# Patient Record
Sex: MALE | Race: WHITE | NOT HISPANIC OR LATINO | Employment: FULL TIME | URBAN - METROPOLITAN AREA
[De-identification: names, ages, dates, MRNs, and addresses within clinical notes are randomized per-mention and may not be internally consistent; named-entity substitution may affect disease eponyms.]

---

## 2017-01-09 PROCEDURE — 82274 ASSAY TEST FOR BLOOD FECAL: CPT | Performed by: FAMILY MEDICINE

## 2017-01-16 ENCOUNTER — OFFICE VISIT (OUTPATIENT)
Dept: FAMILY MEDICINE | Facility: CLINIC | Age: 52
End: 2017-01-16
Payer: COMMERCIAL

## 2017-01-16 VITALS
TEMPERATURE: 96.6 F | DIASTOLIC BLOOD PRESSURE: 82 MMHG | SYSTOLIC BLOOD PRESSURE: 130 MMHG | WEIGHT: 220.8 LBS | OXYGEN SATURATION: 93 % | HEART RATE: 62 BPM | BODY MASS INDEX: 34.65 KG/M2 | HEIGHT: 67 IN

## 2017-01-16 DIAGNOSIS — Z86.79 H/O AORTIC DISSECTION: ICD-10-CM

## 2017-01-16 DIAGNOSIS — Z12.11 SCREEN FOR COLON CANCER: ICD-10-CM

## 2017-01-16 DIAGNOSIS — L30.9 ECZEMA, UNSPECIFIED TYPE: ICD-10-CM

## 2017-01-16 DIAGNOSIS — I10 HYPERTENSION GOAL BP (BLOOD PRESSURE) < 130/80: Primary | ICD-10-CM

## 2017-01-16 DIAGNOSIS — E78.5 HYPERLIPIDEMIA WITH TARGET LDL LESS THAN 130: ICD-10-CM

## 2017-01-16 DIAGNOSIS — Z23 NEED FOR PROPHYLACTIC VACCINATION AND INOCULATION AGAINST INFLUENZA: ICD-10-CM

## 2017-01-16 DIAGNOSIS — B00.1 RECURRENT COLD SORES: ICD-10-CM

## 2017-01-16 DIAGNOSIS — Z11.59 NEED FOR HEPATITIS C SCREENING TEST: ICD-10-CM

## 2017-01-16 LAB — HEMOCCULT STL QL IA: NEGATIVE

## 2017-01-16 PROCEDURE — 99214 OFFICE O/P EST MOD 30 MIN: CPT | Performed by: FAMILY MEDICINE

## 2017-01-16 RX ORDER — MOMETASONE FUROATE 1 MG/G
OINTMENT TOPICAL
Qty: 15 G | Refills: 0 | Status: SHIPPED | OUTPATIENT
Start: 2017-01-16 | End: 2018-01-15

## 2017-01-16 RX ORDER — VALACYCLOVIR HYDROCHLORIDE 1 G/1
2000 TABLET, FILM COATED ORAL 2 TIMES DAILY
Qty: 4 TABLET | Refills: 1 | Status: SHIPPED | OUTPATIENT
Start: 2017-01-16 | End: 2024-02-09

## 2017-01-16 RX ORDER — CARVEDILOL 25 MG/1
25 TABLET ORAL 2 TIMES DAILY WITH MEALS
Qty: 180 TABLET | Refills: 1 | Status: SHIPPED | OUTPATIENT
Start: 2017-01-16 | End: 2017-07-13

## 2017-01-16 RX ORDER — LISINOPRIL AND HYDROCHLOROTHIAZIDE 20; 25 MG/1; MG/1
1 TABLET ORAL DAILY
Qty: 90 TABLET | Refills: 1 | Status: SHIPPED | OUTPATIENT
Start: 2017-01-16 | End: 2017-09-19

## 2017-01-16 ASSESSMENT — ANXIETY QUESTIONNAIRES
5. BEING SO RESTLESS THAT IT IS HARD TO SIT STILL: NOT AT ALL
1. FEELING NERVOUS, ANXIOUS, OR ON EDGE: NOT AT ALL
7. FEELING AFRAID AS IF SOMETHING AWFUL MIGHT HAPPEN: NOT AT ALL
3. WORRYING TOO MUCH ABOUT DIFFERENT THINGS: NOT AT ALL
6. BECOMING EASILY ANNOYED OR IRRITABLE: NOT AT ALL
IF YOU CHECKED OFF ANY PROBLEMS ON THIS QUESTIONNAIRE, HOW DIFFICULT HAVE THESE PROBLEMS MADE IT FOR YOU TO DO YOUR WORK, TAKE CARE OF THINGS AT HOME, OR GET ALONG WITH OTHER PEOPLE: NOT DIFFICULT AT ALL
GAD7 TOTAL SCORE: 0
2. NOT BEING ABLE TO STOP OR CONTROL WORRYING: NOT AT ALL

## 2017-01-16 ASSESSMENT — PAIN SCALES - GENERAL: PAINLEVEL: NO PAIN (0)

## 2017-01-16 ASSESSMENT — PATIENT HEALTH QUESTIONNAIRE - PHQ9: 5. POOR APPETITE OR OVEREATING: NOT AT ALL

## 2017-01-16 NOTE — NURSING NOTE
"Chief Complaint   Patient presents with     Hypertension       Initial /82 mmHg  Pulse 62  Temp(Src) 96.6  F (35.9  C) (Oral)  Ht 5' 7\" (1.702 m)  Wt 220 lb 12.8 oz (100.154 kg)  BMI 34.57 kg/m2  SpO2 93% Estimated body mass index is 34.57 kg/(m^2) as calculated from the following:    Height as of this encounter: 5' 7\" (1.702 m).    Weight as of this encounter: 220 lb 12.8 oz (100.154 kg).  BP completed using cuff size: katerina Forbes CMA    "

## 2017-01-16 NOTE — MR AVS SNAPSHOT
After Visit Summary   1/16/2017    Luther Mariee    MRN: [id number]           Patient Information     Date Of Birth          1965        Visit Information        Provider Department      1/16/2017 10:00 AM Watson Bhagat MD [facility]        Today's Diagnoses     Hypertension goal BP (blood pressure) < 130/80    -  1     H/O aortic dissection         Hyperlipidemia with target LDL less than 130         Recurrent cold sores         Eczema, unspecified type         Screen for colon cancer         Need for hepatitis C screening test         Need for prophylactic vaccination and inoculation against influenza           Care Instructions    [facility]    If you have any questions regarding to your visit please contact your care team:       Team Purple:   Clinic Hours Telephone Number   [facility] [address] Dr.   7am-7pm  Monday - Thursday   7am-5pm  Fridays  [phone]  (Appointment scheduling available 24/7)    Questions about your Visit?   Team Line:  [phone]   Urgent Care - [place] and [facility] - 11am-9pm Monday-Friday Saturday-Sunday- 9am-5pm   [place] - 5pm-9pm Monday-Friday Saturday-Sunday- 9am-5pm  [phone] - [place]   [phone] - [place]       What options do I have for visits at the clinic other than the traditional office visit?  To expand how we care for you, many of our providers are utilizing electronic visits (e-visits) and telephone visits, when medically appropriate, for interactions with their patients rather than a visit in the clinic.   We also offer nurse visits for many medical concerns. Just like any other service, we will bill your insurance company for this type of visit based on time spent on the phone with your provider. Not all insurance companies cover these visits. Please check with your medical insurance if this type of visit is covered. You  will be responsible for any charges that are not paid by your insurance.      E-visits via [email or web address]:  generally incur a $35.00 fee.  Telephone visits:  Time spent on the phone: *charged based on time that is spent on the phone in increments of 10 minutes. Estimated cost:   5-10 mins $30.00   11-20 mins. $59.00   21-30 mins. $85.00     Use [email or web address] (secure email communication and access to your chart) to send your primary care provider a message or make an appointment. Ask someone on your Team how to sign up for [employer].  For a Price Quote for your services, please call our [employer] Line at [phone].  As always, Thank you for trusting us with your health care needs!    Discharged by Ashtyn Forbes CMA          Follow-ups after your visit        Future tests that were ordered for you today     Open Future Orders        Priority Expected Expires Ordered    Hepatitis C Screen Reflex to HCV RNA Quant and Genotype Routine  1/16/2018 1/16/2017    Lipid panel reflex to direct LDL Routine  1/16/2018 1/16/2017    Albumin Random Urine Quantitative Routine  1/16/2018 1/16/2017    Basic metabolic panel Routine  1/16/2018 1/16/2017            Who to contact     If you have questions or need follow up information about today's clinic visit or your schedule please contact [facility] directly at [phone].  Normal or non-critical lab and imaging results will be communicated to you by MyChart, letter or phone within 4 business days after the clinic has received the results. If you do not hear from us within 7 days, please contact the clinic through MyChart or phone. If you have a critical or abnormal lab result, we will notify you by phone as soon as possible.  Submit refill requests through [employer] or call your pharmacy and they will forward the refill request to us. Please allow 3 business days for your refill to be completed.          Additional Information About Your Visit        [email or web address] Information      "[employer] gives you secure access to your electronic health record. If you see a primary care provider, you can also send messages to your care team and make appointments. If you have questions, please call your primary care clinic.  If you do not have a primary care provider, please call [phone] and they will assist you.        Care EveryWhere ID     This is your Care EveryWhere ID. This could be used by other organizations to access your [place] medical records  [id number]        Your Vitals Were     Pulse Temperature Height BMI (Body Mass Index) Pulse Oximetry       62 96.6  F (35.9  C) (Oral) 5' 7\" (1.702 m) 34.57 kg/m2 93%        Blood Pressure from Last 3 Encounters:   01/16/17 130/82   03/21/16 132/82   08/31/15 119/68    Weight from Last 3 Encounters:   01/16/17 220 lb 12.8 oz (100.154 kg)   03/21/16 216 lb 6.4 oz (98.158 kg)   08/31/15 215 lb (97.523 kg)                 Today's Medication Changes          These changes are accurate as of: 1/16/17 10:35 AM.  If you have any questions, ask your nurse or doctor.               Start taking these medicines.        Dose/Directions    carvedilol 25 MG tablet   Commonly known as:  COREG   Used for:  Hypertension goal BP (blood pressure) < 130/80   Started by:  Watson Bhagat MD        Dose:  25 mg   Take 1 tablet (25 mg) by mouth 2 times daily (with meals)   Quantity:  180 tablet   Refills:  1       valACYclovir 1000 mg tablet   Commonly known as:  VALTREX   Used for:  Recurrent cold sores   Started by:  Watson Bhagat MD        Dose:  2000 mg   Take 2 tablets (2,000 mg) by mouth 2 times daily   Quantity:  4 tablet   Refills:  1         These medicines have changed or have updated prescriptions.        Dose/Directions    lisinopril-hydrochlorothiazide 20-25 MG per tablet   Commonly known as:  PRINZIDE/ZESTORETIC   This may have changed:  See the new instructions.   Used for:  Hypertension goal BP (blood pressure) < 130/80   Changed by:  " Watson Bhagat MD        Dose:  1 tablet   Take 1 tablet by mouth daily   Quantity:  90 tablet   Refills:  1            Where to get your medicines      These medications were sent to [place] [address] [facility] [place] - [address]  [address] [facility] [place] [address]     Phone:  [phone]    - carvedilol 25 MG tablet  - lisinopril-hydrochlorothiazide 20-25 MG per tablet  - mometasone 0.1 % ointment  - valACYclovir 1000 mg tablet             Primary Care Provider Office Phone # Fax #    Watson Bhagat [phone]       [address]  [place] [address]        Thank you!     Thank you for choosing [facility]  for your care. Our goal is always to provide you with excellent care. Hearing back from our patients is one way we can continue to improve our services. Please take a few minutes to complete the written survey that you may receive in the mail after your visit with us. Thank you!             Your Updated Medication List - Protect others around you: Learn how to safely use, store and throw away your medicines at www.disposemymeds.org.          This list is accurate as of: 1/16/17 10:35 AM.  Always use your most recent med list.                   Brand Name Dispense Instructions for use    aspirin 81 MG tablet      Take 1 tablet by mouth daily.       carvedilol 25 MG tablet    COREG    180 tablet    Take 1 tablet (25 mg) by mouth 2 times daily (with meals)       ibuprofen 200 MG tablet   Generic drug:  ibuprofen      Take 800 mg by mouth.       lisinopril-hydrochlorothiazide 20-25 MG per tablet    PRINZIDE/ZESTORETIC    90 tablet    Take 1 tablet by mouth daily       metoprolol 100 MG tablet    LOPRESSOR    180 tablet    TAKE 1 TABLET BY MOUTH TWICE DAILY       mometasone 0.1 % ointment    ELOCON    15 g    Apply sparingly to affected area twice daily for 14 days.  Do not apply to face. (Follow up for further refills)       valACYclovir  1000 mg tablet    VALTREX    4 tablet    Take 2 tablets (2,000 mg) by mouth 2 times daily

## 2017-01-16 NOTE — PROGRESS NOTES
SUBJECTIVE:                                                    Luther Mariee is a 51 year old male who presents to clinic today for the following health issues:    Hypertension Follow-up      Outpatient blood pressures are being checked at home.  Results are 120/80.    Low Salt Diet: no added salt     AAA   Had a repair 6 yrs ago  MRI from last year ago was fine. Also had evaluation by cardiology and were happy with the way things are going   Since left hospital, due a dissecting aneurysm and has not feeling well since; tired all the time.   With the new exercise feeling better. He is able to work 10 hours a day.    Weight:   Not been able to lose weight; started on a new exercise and diet  Wt Readings from Last 4 Encounters:   01/16/17 220 lb 12.8 oz (100.154 kg)   03/21/16 216 lb 6.4 oz (98.158 kg)   08/31/15 215 lb (97.523 kg)   09/08/14 203 lb (92.08 kg)     Recurrent Herpes:   Takes Valtrex.    Eczema:   Has recurrent rashes.        Amount of exercise or physical activity: 6-7 days/week for an average of 15-30 minutes    Problems taking medications regularly: No    Medication side effects: none    Diet: low salt    PROBLEMS TO ADD ON...    Problem list and histories reviewed & adjusted, as indicated.  Additional history: as documented    Patient Active Problem List   Diagnosis     Aortic dissection (H)     Splenic infarct     CARDIOVASCULAR SCREENING; LDL GOAL LESS THAN 130     Hypertension goal BP (blood pressure) < 130/80     Health Care Home     Ascending aortic aneurysm (H)     SBO (small bowel obstruction) (H)     Ischemic colitis (H)     Bicuspid aortic valve     bmi 30     Vitamin B12 deficiency     Vitamin D deficiency     Chronic pain     Hyperlipidemia with target LDL less than 130     Other chronic pain     H/O aortic dissection     CKD (chronic kidney disease) stage 2, GFR 60-89 ml/min     Past Surgical History   Procedure Laterality Date     Cholecystectomy, open       Endovas repair,  "infrarenl abdom aortic aneurysm/dissect; mswdf-vdn-ajyib/aorto-unifemoral prosthesis  2/10     aorta dissection     Small bowel resection  2010     Dr. Vane River     Colonoscopy  2010     superficial ulcerws rt colon       Social History   Substance Use Topics     Smoking status: Former Smoker     Quit date: 12/06/2001     Smokeless tobacco: Never Used     Alcohol Use: No     Family History   Problem Relation Age of Onset     Unknown/Adopted Mother      Unknown/Adopted Father      Unknown/Adopted Maternal Grandmother      Unknown/Adopted Maternal Grandfather      Unknown/Adopted Paternal Grandmother      Unknown/Adopted Paternal Grandfather          ROS:  Constitutional, HEENT, cardiovascular, pulmonary, gi and gu systems are negative, except as otherwise noted.    OBJECTIVE:                                                    /82 mmHg  Pulse 62  Temp(Src) 96.6  F (35.9  C) (Oral)  Ht 5' 7\" (1.702 m)  Wt 220 lb 12.8 oz (100.154 kg)  BMI 34.57 kg/m2  SpO2 93%  Body mass index is 34.57 kg/(m^2).  GENERAL: healthy, alert and no distress  EYES: Eyes grossly normal to inspection, PERRL and conjunctivae and sclerae normal  HENT: ear canals and TM's normal, nose and mouth without ulcers or lesions  NECK: no adenopathy and thyroid normal to palpation  RESP: lungs clear to auscultation - no rales, rhonchi or wheezes  CV: regular rate and rhythm, systolic murmur, click or rub, no peripheral edema  ABDOMEN: soft, nontender, no masses and bowel sounds normal  MS: no gross musculoskeletal defects noted, no edema  NEURO: Normal strength and tone, mentation intact and speech normal  PSYCH: mentation appears normal, affect normal/bright    Diagnostic Test Results:  none      ASSESSMENT/PLAN:                                                    (I10) Hypertension goal BP (blood pressure) < 130/80  (primary encounter diagnosis)  Comment: BP well controlled. Has concern about fatigue and wonders whether metoprolol can " cause that. Discussed fact that metoprolo can cause that and will switch to Coreg and see if this helps.  Plan: Albumin Random Urine Quantitative, Basic         metabolic panel, lisinopril-hydrochlorothiazide        (PRINZIDE/ZESTORETIC) 20-25 MG per tablet,         carvedilol (COREG) 25 MG tablet    (Z86.79) H/O aortic dissection  Comment: Stable. Had follow up MRI on 3/23/2016 and was normal.   Plan: Continue follow up with cardiology    (E78.5) Hyperlipidemia with target LDL less than 130  Comment: Not had a recent check; screen discussed having these checked and consider a statin if elevated    (B00.1) Recurrent cold sores  Comment: Valtex efill  Plan: valACYclovir (VALTREX) 1000 mg tablet    (L30.9) Eczema, unspecified type  Comment: Recurrent flare  Plan: mometasone (ELOCON) 0.1 % ointment    (Z12.11) Screen for colon cancer  Comment: Due for recheck, opted for FIT  Plan: FIT    (Z11.59) Need for hepatitis C screening test  Plan: Hepatitis C Screen Reflex to HCV RNA Quant and         Genotype, Lipid panel reflex to direct LDL    Follow up in 3 months or sooner with concerns    Watson Bhagat MD  [facility]

## 2017-01-16 NOTE — PATIENT INSTRUCTIONS
[facility]    If you have any questions regarding to your visit please contact your care team:       Team Purple:   Clinic Hours Telephone Number   [facility] [address] Dr.   7am-7pm  Monday - Thursday   7am-5pm  Fridays  [phone]  (Appointment scheduling available 24/7)    Questions about your Visit?   Team Line:  [phone]   Urgent Care - [place] and [facility] - 11am-9pm Monday-Friday Saturday-Sunday- 9am-5pm   [place] - 5pm-9pm Monday-Friday Saturday-Sunday- 9am-5pm  [phone] - [facility]  [phone] - [place]       What options do I have for visits at the clinic other than the traditional office visit?  To expand how we care for you, many of our providers are utilizing electronic visits (e-visits) and telephone visits, when medically appropriate, for interactions with their patients rather than a visit in the clinic.   We also offer nurse visits for many medical concerns. Just like any other service, we will bill your insurance company for this type of visit based on time spent on the phone with your provider. Not all insurance companies cover these visits. Please check with your medical insurance if this type of visit is covered. You will be responsible for any charges that are not paid by your insurance.      E-visits via [employer]:  generally incur a $35.00 fee.  Telephone visits:  Time spent on the phone: *charged based on time that is spent on the phone in increments of 10 minutes. Estimated cost:   5-10 mins $30.00   11-20 mins. $59.00   21-30 mins. $85.00     Use [employer] (secure email communication and access to your chart) to send your primary care provider a message or make an appointment. Ask someone on your Team how to sign up for [employer].  For a Price Quote for your services, please call our [employer] Line at [phone].  As always, Thank you for trusting us with your health care  needs!    Discharged by Ashtyn Forbes, CMA

## 2017-01-17 ASSESSMENT — ANXIETY QUESTIONNAIRES: GAD7 TOTAL SCORE: 0

## 2017-01-17 ASSESSMENT — PATIENT HEALTH QUESTIONNAIRE - PHQ9: SUM OF ALL RESPONSES TO PHQ QUESTIONS 1-9: 3

## 2017-04-03 ENCOUNTER — OFFICE VISIT (OUTPATIENT)
Dept: FAMILY MEDICINE | Facility: CLINIC | Age: 52
End: 2017-04-03
Payer: COMMERCIAL

## 2017-04-03 VITALS
DIASTOLIC BLOOD PRESSURE: 68 MMHG | HEIGHT: 67 IN | TEMPERATURE: 97 F | OXYGEN SATURATION: 98 % | SYSTOLIC BLOOD PRESSURE: 124 MMHG | HEART RATE: 88 BPM | WEIGHT: 221 LBS | BODY MASS INDEX: 34.69 KG/M2

## 2017-04-03 DIAGNOSIS — I71.00 DISSECTION OF AORTA, UNSPECIFIED PORTION OF AORTA (H): ICD-10-CM

## 2017-04-03 DIAGNOSIS — I71.21 ASCENDING AORTIC ANEURYSM (H): ICD-10-CM

## 2017-04-03 DIAGNOSIS — M77.9 ENTHESITIS: Primary | ICD-10-CM

## 2017-04-03 DIAGNOSIS — M72.2 PLANTAR FASCIITIS: ICD-10-CM

## 2017-04-03 PROCEDURE — 99214 OFFICE O/P EST MOD 30 MIN: CPT | Performed by: FAMILY MEDICINE

## 2017-04-03 RX ORDER — TRAMADOL HYDROCHLORIDE 50 MG/1
50 TABLET ORAL EVERY 8 HOURS PRN
Qty: 15 TABLET | Refills: 0 | Status: SHIPPED | OUTPATIENT
Start: 2017-04-03 | End: 2017-04-03

## 2017-04-03 RX ORDER — TRAMADOL HYDROCHLORIDE 50 MG/1
50 TABLET ORAL EVERY 8 HOURS PRN
Qty: 15 TABLET | Refills: 0 | Status: SHIPPED | OUTPATIENT
Start: 2017-04-03 | End: 2017-04-08

## 2017-04-03 NOTE — PROGRESS NOTES
SUBJECTIVE:                                                    Luther Mariee is a 51 year old male who presents to clinic today for the following health issues:    Patient with pain in the left heel; been doing ibuprofen a couple of time a day and is not helping  He is always on his feet; worse in the morning.    Joint Pain     Onset: 6 days    Description:   Location: left foot and heel  Character: Sharp, Dull ache and Stabbing    Intensity: moderate, severe    Progression of Symptoms: same    Accompanying Signs & Symptoms:  Other symptoms: none   History:   Previous similar pain: no       Precipitating factors:   Trauma or overuse: no     Alleviating factors:  Improved by: nothing       Therapies Tried and outcome: ice,wrapped ibuprofen,rest    Aortic dissection:   Due for MRI this year.   In 2010 treated with graft; Endovas repair, infrarenalabdom aortic aneurysm/dissect; zwcvf-asj-cohew/aorto-unifemoral prosthesis      3/23/2016:   Thoracic MRA with and without contrast:     1. The aortic root is severely dilated in size. The ascending aorta  has a graft that extends to the mid ascending aorta. There is a  dissection flap starting from the proximal aortic arch, involving the  innominate, the subclavian, and both common carotid arteries, and  extending all the way down the descending thoracic and abdominal aorta  beyond the iliac bifurcation. The pelvic aorta was not imaged beyond  this level.     2. There is extensive thrombosis in the false lumen starting from the  proximal descending thoracic aorta up to the origin of the celiac  artery. The celiac axis, superior mesenteric and inferior mesenteric  arteries as well as the renal arteries are normal. The dissection flap  extend into the left main renal artery.     3. The main and proximal branch pulmonary arteries are normal in size.        4. The systemic venous connections are normal.   Problem list and histories reviewed & adjusted, as  "indicated.  Additional history: as documented    Patient Active Problem List   Diagnosis     Aortic dissection (H)     Splenic infarct     CARDIOVASCULAR SCREENING; LDL GOAL LESS THAN 130     Hypertension goal BP (blood pressure) < 130/80     Health Care Home     Ascending aortic aneurysm (H)     SBO (small bowel obstruction) (H)     Ischemic colitis (H)     Bicuspid aortic valve     bmi 30     Vitamin B12 deficiency     Vitamin D deficiency     Chronic pain     Hyperlipidemia with target LDL less than 130     Other chronic pain     H/O aortic dissection     CKD (chronic kidney disease) stage 2, GFR 60-89 ml/min     Past Surgical History:   Procedure Laterality Date     CHOLECYSTECTOMY, OPEN       COLONOSCOPY  2010    superficial ulcerws rt colon     ENDOVAS REPAIR, INFRARENL ABDOM AORTIC ANEURYSM/DISSECT; NARWV-YPE-CXYCL/AORTO-UNIFEMORAL PROSTHESIS  2/10    aorta dissection     SMALL BOWEL RESECTION  2010    Dr. Vane River       Social History   Substance Use Topics     Smoking status: Former Smoker     Quit date: 12/6/2001     Smokeless tobacco: Never Used     Alcohol use No     Family History   Problem Relation Age of Onset     Unknown/Adopted Mother      Unknown/Adopted Father      Unknown/Adopted Maternal Grandmother      Unknown/Adopted Maternal Grandfather      Unknown/Adopted Paternal Grandmother      Unknown/Adopted Paternal Grandfather            Reviewed and updated as needed this visit by clinical staff  Tobacco  Allergies  Meds  Med Hx  Surg Hx  Fam Hx  Soc Hx      Reviewed and updated as needed this visit by Provider         ROS:  Constitutional, HEENT, cardiovascular, pulmonary, gi and gu systems are negative, except as otherwise noted.    OBJECTIVE:                                                    /68 (BP Location: Left arm, Patient Position: Chair, Cuff Size: Adult Large)  Pulse 88  Temp 97  F (36.1  C)  Ht 5' 7\" (1.702 m)  Wt 221 lb (100.2 kg)  SpO2 98%  BMI 34.61 kg/m2  Body " mass index is 34.61 kg/(m^2).  GENERAL: healthy, alert and no distress  NECK: no adenopathy and thyroid normal to palpation  RESP: lungs clear to auscultation - no rales, rhonchi or wheezes  CV: regular rate and rhythm, no murmur, click or rub, no peripheral edema   MS: no gross musculoskeletal defects noted, no edema  HEEL:   Tenderness in the tendon achilles insert.   Slight tenderness      ASSESSMENT/PLAN:                                                    (M77.9) Enthesitis  (primary encounter diagnosis)  Comment: Tendinitis of TA. .Discussed rest, ice, stretches and elevation. Will do a short course of tramadol    (M72.2) Plantar fasciitis    Discussed pathophysiology of this condition and implications. Superfeet insoles recommended. Good walking shoes or running shoes recommended for activities.  Minimize high-impact activities. Stretching twice daily. Ice after activity.     (I71.00) Dissection of aorta, unspecified portion of aorta (H)  Comment: With aortic dissection; in 2010 treated with graft; Endovas repair, infrarenalabdom aortic aneurysm/dissect; icxsb-yhd-nawyw/aorto-unifemoral prosthesis    (I71.2) Ascending aortic aneurysm (H)  Comment: BP well controlled on carvedilol, which he says is working great. Reviewed thoracic MRA from last year was normal. Not followed up with cardiology, but says is coming up.  Plan: Labs due; will come in fasting, future orders in.          Call for a posterior night splint if not improving in the next 2-3 weeks for heel pain.    Watson Bhagat MD  [facility]

## 2017-04-03 NOTE — MR AVS SNAPSHOT
After Visit Summary   4/3/2017    Luther Mariee    MRN: [id number]           Patient Information     Date Of Birth          1965        Visit Information        Provider Department      4/3/2017 10:40 AM Watson Bhagat MD [facility]        Today's Diagnoses     Enthesitis    -  1    Plantar fasciitis          Care Instructions    [facility]    If you have any questions regarding to your visit please contact your care team:       Team Purple:   Clinic Hours Telephone Number   [facility] [address] Dr[address]   7am-7pm  Monday - Thursday   7am-5pm  Fridays  [phone]  (Appointment scheduling available 24/7)    Questions about your Visit?   Team Line:  [phone]   [facility] [address] [facility] 11am-9pm Monday-Friday Saturday-Sunday- 9am-5pm   [place] - 5pm-9pm Monday-Friday Saturday-Sunday- 9am-5pm  [phone] - [place]   [phone] - [place]       What options do I have for visits at the clinic other than the traditional office visit?  To expand how we care for you, many of our providers are utilizing electronic visits (e-visits) and telephone visits, when medically appropriate, for interactions with their patients rather than a visit in the clinic.   We also offer nurse visits for many medical concerns. Just like any other service, we will bill your insurance company for this type of visit based on time spent on the phone with your provider. Not all insurance companies cover these visits. Please check with your medical insurance if this type of visit is covered. You will be responsible for any charges that are not paid by your insurance.      E-visits via mimoOn:  generally incur a $35.00 fee.  Telephone visits:  Time spent on the phone: *charged based on time that is spent on the phone in increments of 10 minutes. Estimated cost:   5-10 mins $30.00   11-20 mins. $59.00  "  21-30 mins. $85.00     Use Glu Mobile (secure email communication and access to your chart) to send your primary care provider a message or make an appointment. Ask someone on your Team how to sign up for GeoPollt.  For a Price Quote for your services, please call our Consumer Price Line at [phone].  As always, Thank you for trusting us with your health care needs!            Follow-ups after your visit        Who to contact     If you have questions or need follow up information about today's clinic visit or your schedule please contact [facility] directly at [phone].  Normal or non-critical lab and imaging results will be communicated to you by Cemmercehart, letter or phone within 4 business days after the clinic has received the results. If you do not hear from us within 7 days, please contact the clinic through GeoPollt or phone. If you have a critical or abnormal lab result, we will notify you by phone as soon as possible.  Submit refill requests through Glu Mobile or call your pharmacy and they will forward the refill request to us. Please allow 3 business days for your refill to be completed.          Additional Information About Your Visit        Cemmercehart Information     GeoPollt gives you secure access to your electronic health record. If you see a primary care provider, you can also send messages to your care team and make appointments. If you have questions, please call your primary care clinic.  If you do not have a primary care provider, please call [phone] and they will assist you.        Care EveryWhere ID     This is your Care EveryWhere ID. This could be used by other organizations to access your [facility] medical records  [id number]        Your Vitals Were     Pulse Temperature Height Pulse Oximetry BMI (Body Mass Index)       88 97  F (36.1  C) 5' 7\" (1.702 m) 98% 34.61 kg/m2        Blood Pressure from Last 3 Encounters:   04/03/17 124/68   01/16/17 130/82   03/21/16 132/82    " Weight from Last 3 Encounters:   04/03/17 221 lb (100.2 kg)   01/16/17 220 lb 12.8 oz (100.2 kg)   03/21/16 216 lb 6.4 oz (98.2 kg)              Today, you had the following     No orders found for display         Today's Medication Changes          These changes are accurate as of: 4/3/17 11:05 AM.  If you have any questions, ask your nurse or doctor.               Start taking these medicines.        Dose/Directions    traMADol 50 MG tablet   Commonly known as:  ULTRAM   Used for:  Enthesitis, Plantar fasciitis   Started by:  Watson Bhagat MD        Dose:  50 mg   Take 1 tablet (50 mg) by mouth every 8 hours as needed for moderate pain   Quantity:  15 tablet   Refills:  0         Stop taking these medicines if you haven't already. Please contact your care team if you have questions.     metoprolol 100 MG tablet   Commonly known as:  LOPRESSOR   Stopped by:  Watson Bhagat MD                Where to get your medicines      Some of these will need a paper prescription and others can be bought over the counter.  Ask your nurse if you have questions.     Bring a paper prescription for each of these medications     traMADol 50 MG tablet                Primary Care Provider Office Phone # Fax #    Watson Bhagat [phone]       [address] [address]        Thank you!     Thank you for choosing [facility]  for your care. Our goal is always to provide you with excellent care. Hearing back from our patients is one way we can continue to improve our services. Please take a few minutes to complete the written survey that you may receive in the mail after your visit with us. Thank you!             Your Updated Medication List - Protect others around you: Learn how to safely use, store and throw away your medicines at www.disposemymeds.org.          This list is accurate as of: 4/3/17 11:05 AM.  Always use your most recent med list.                    Brand Name Dispense Instructions for use    aspirin 81 MG tablet      Take 1 tablet by mouth daily.       carvedilol 25 MG tablet    COREG    180 tablet    Take 1 tablet (25 mg) by mouth 2 times daily (with meals)       ibuprofen 200 MG tablet   Generic drug:  ibuprofen      Take 800 mg by mouth.       lisinopril-hydrochlorothiazide 20-25 MG per tablet    PRINZIDE/ZESTORETIC    90 tablet    Take 1 tablet by mouth daily       mometasone 0.1 % ointment    ELOCON    15 g    Apply sparingly to affected area twice daily for 14 days.  Do not apply to face. (Follow up for further refills)       traMADol 50 MG tablet    ULTRAM    15 tablet    Take 1 tablet (50 mg) by mouth every 8 hours as needed for moderate pain       valACYclovir 1000 mg tablet    VALTREX    4 tablet    Take 2 tablets (2,000 mg) by mouth 2 times daily

## 2017-04-03 NOTE — PATIENT INSTRUCTIONS
[facility]    If you have any questions regarding to your visit please contact your care team:       Team Purple:   Clinic Hours Telephone Number   [facility] [address] Dr[address]   7am-7pm  Monday - Thursday   7am-5pm  Fridays  [phone]  (Appointment scheduling available 24/7)    Questions about your Visit?   Team Line:  [phone]   Urgent Care - [place] and [facility] - 11am-9pm Monday-Friday Saturday-Sunday- 9am-5pm   [place] - 5pm-9pm Monday-Friday Saturday-Sunday- 9am-5pm  [phone] - [facility]  [phone] - [place]       What options do I have for visits at the clinic other than the traditional office visit?  To expand how we care for you, many of our providers are utilizing electronic visits (e-visits) and telephone visits, when medically appropriate, for interactions with their patients rather than a visit in the clinic.   We also offer nurse visits for many medical concerns. Just like any other service, we will bill your insurance company for this type of visit based on time spent on the phone with your provider. Not all insurance companies cover these visits. Please check with your medical insurance if this type of visit is covered. You will be responsible for any charges that are not paid by your insurance.      E-visits via Kannuu:  generally incur a $35.00 fee.  Telephone visits:  Time spent on the phone: *charged based on time that is spent on the phone in increments of 10 minutes. Estimated cost:   5-10 mins $30.00   11-20 mins. $59.00   21-30 mins. $85.00     Use Reorg Researcht (secure email communication and access to your chart) to send your primary care provider a message or make an appointment. Ask someone on your Team how to sign up for Kannuu.  For a Price Quote for your services, please call our Consumer Price Line at [phone].  As always, Thank you for trusting us with your health care needs!

## 2017-04-03 NOTE — NURSING NOTE
"Chief Complaint   Patient presents with     Musculoskeletal Problem     left foot pain       Initial /68 (BP Location: Left arm, Patient Position: Chair, Cuff Size: Adult Large)  Pulse 88  Temp 97  F (36.1  C)  Ht 5' 7\" (1.702 m)  Wt 221 lb (100.2 kg)  SpO2 98%  BMI 34.61 kg/m2 Estimated body mass index is 34.61 kg/(m^2) as calculated from the following:    Height as of this encounter: 5' 7\" (1.702 m).    Weight as of this encounter: 221 lb (100.2 kg).  Medication Reconciliation: complete   Marlena Carroll MA      "

## 2017-04-30 ENCOUNTER — MYC MEDICAL ADVICE (OUTPATIENT)
Dept: FAMILY MEDICINE | Facility: CLINIC | Age: 52
End: 2017-04-30

## 2017-04-30 DIAGNOSIS — L98.9 SKIN LESIONS: Primary | ICD-10-CM

## 2017-05-01 NOTE — TELEPHONE ENCOUNTER
Referral placed give patient number to call to set up appointmment    Your provider has referred you to: [facility] (2 locations) [phone]   [email or web address]    Please be aware that coverage of these services is subject to the terms and limitations of your health insurance plan.  Call member services at your health plan with any benefit or coverage questions.      Please bring the following with you to your appointment:    (1) Any X-Rays, CTs or MRIs which have been performed.  Contact the facility where they were done to arrange for  prior to your scheduled appointment.    (2) List of current medications  (3) This referral request   (4) Any documents/labs given to you for this referral

## 2017-05-16 NOTE — TELEPHONE ENCOUNTER
Copied from previous encounter:   Watson Bhagat MD Physician Signed  Date of Service: 05/16/2017 10:27 AM         It can be used to treat skin conditions but is prescribed by dermatologists; I will suggest that he discussed this option with the dermatologist he sees at [facility].           Bruna Fallon RN

## 2017-05-16 NOTE — TELEPHONE ENCOUNTER
Copied from [place]:   My friend who is a doctor, but now lives in [place].  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  The dermatologist suggested freezing them off at an ungodly cost.  My doctor friend said they do that, and they usually come back.  But this Fluorouracil-5 has worked for him his whole life.  He's 85.  Can I get a prescription for that cream?  Let me know.  Thanks!    Please advise   Bruna Fallon RN

## 2017-07-13 DIAGNOSIS — I10 HYPERTENSION GOAL BP (BLOOD PRESSURE) < 130/80: ICD-10-CM

## 2017-07-13 RX ORDER — CARVEDILOL 25 MG/1
TABLET ORAL
Qty: 180 TABLET | Refills: 1 | Status: SHIPPED | OUTPATIENT
Start: 2017-07-13 | End: 2018-01-06

## 2017-07-13 NOTE — TELEPHONE ENCOUNTER
Prescription approved per [facility] Refill Protocol.    Yolanda Anderson RN

## 2017-09-10 ENCOUNTER — TELEPHONE (OUTPATIENT)
Dept: FAMILY MEDICINE | Facility: CLINIC | Age: 52
End: 2017-09-10

## 2017-09-10 DIAGNOSIS — I10 HYPERTENSION GOAL BP (BLOOD PRESSURE) < 130/80: ICD-10-CM

## 2017-09-10 RX ORDER — LISINOPRIL AND HYDROCHLOROTHIAZIDE 20; 25 MG/1; MG/1
TABLET ORAL
Qty: 90 TABLET | Refills: 1 | Status: CANCELLED | OUTPATIENT
Start: 2017-09-10

## 2017-09-11 NOTE — TELEPHONE ENCOUNTER
Lisinopril-hydrochlorothiazide        Last Written Prescription Date: 01/16/2017  Last Fill Quantity: 90, # refills: 1  Last Office Visit with G, P or [facility] prescribing provider: 04/03/2017       Potassium   Date Value Ref Range Status   03/21/2016 3.6 3.4 - 5.3 mmol/L Final     Creatinine   Date Value Ref Range Status   03/21/2016 1.28 (H) 0.66 - 1.25 mg/dL Final     BP Readings from Last 3 Encounters:   04/03/17 124/68   01/16/17 130/82   03/21/16 132/82

## 2017-09-11 NOTE — TELEPHONE ENCOUNTER
Routing refill request to provider for review/approval because:  Labs out of range:  Maksim  Labs not current:  STEVEN Schaeffer RN - BC

## 2017-09-19 ENCOUNTER — TELEPHONE (OUTPATIENT)
Dept: FAMILY MEDICINE | Facility: CLINIC | Age: 52
End: 2017-09-19

## 2017-09-19 DIAGNOSIS — I10 HYPERTENSION GOAL BP (BLOOD PRESSURE) < 130/80: ICD-10-CM

## 2017-09-19 RX ORDER — LISINOPRIL AND HYDROCHLOROTHIAZIDE 20; 25 MG/1; MG/1
TABLET ORAL
Qty: 90 TABLET | Refills: 1 | Status: CANCELLED | OUTPATIENT
Start: 2017-09-19

## 2017-09-19 RX ORDER — LISINOPRIL AND HYDROCHLOROTHIAZIDE 20; 25 MG/1; MG/1
1 TABLET ORAL DAILY
Qty: 30 TABLET | Refills: 0 | Status: SHIPPED | OUTPATIENT
Start: 2017-09-19 | End: 2017-10-15

## 2017-09-19 RX ORDER — LISINOPRIL AND HYDROCHLOROTHIAZIDE 20; 25 MG/1; MG/1
1 TABLET ORAL DAILY
Qty: 90 TABLET | Refills: 1 | Status: CANCELLED | OUTPATIENT
Start: 2017-09-19

## 2017-09-19 NOTE — TELEPHONE ENCOUNTER
Reason for Call:  Other prescription    Detailed comments: Patient called to check on status of the refill for the prescription Lisinopril-HCTZ as he is currently out of this medication today / Patient has also scheduled an Lab Appt dated 10/4/17    [facility]     Phone Number Patient can be reached at: Home number on file [phone] (home)    Best Time: todday    Can we leave a detailed message on this number? YES    Call taken on 9/19/2017 at 7:43 AM by Malgorzata Iniguez

## 2017-09-19 NOTE — TELEPHONE ENCOUNTER
Routing refill request to provider for review/approval because:  Labs out of range:  Cr  Labs not current:  K, Maksim Katz RN - BC

## 2017-09-19 NOTE — TELEPHONE ENCOUNTER
lisinopril-hydrochlorothiazide (PRINZIDE/ZESTORETIC) 20-25 MG per tablet      Last Written Prescription Date: 01/16/2017  Last Fill Quantity: 90, # refills: 1  Last Office Visit with G, NADYA or [facility] prescribing provider: 04/13/2017       Potassium   Date Value Ref Range Status   03/21/2016 3.6 3.4 - 5.3 mmol/L Final     Creatinine   Date Value Ref Range Status   03/21/2016 1.28 (H) 0.66 - 1.25 mg/dL Final     BP Readings from Last 3 Encounters:   04/03/17 124/68   01/16/17 130/82   03/21/16 132/82     Tierra Castillo MA

## 2017-09-19 NOTE — TELEPHONE ENCOUNTER
30 day jade refill sent to pharmacy.  Patient due for labs for further refills.   Bruna Fallon RN

## 2017-10-15 DIAGNOSIS — Z12.11 COLON CANCER SCREENING: ICD-10-CM

## 2017-10-15 DIAGNOSIS — I10 HYPERTENSION GOAL BP (BLOOD PRESSURE) < 130/80: Primary | ICD-10-CM

## 2017-10-15 DIAGNOSIS — E78.5 HYPERLIPIDEMIA WITH TARGET LDL LESS THAN 130: ICD-10-CM

## 2017-10-16 NOTE — TELEPHONE ENCOUNTER
Lisinopril-hydrochlorothiazide   Last Written Prescription Date: 09/19/2017  Last Fill Quantity: 30, # refills: 0  Last Office Visit with G, P or [facility] prescribing provider: 04/03/2017     Potassium   Date Value Ref Range Status   03/21/2016 3.6 3.4 - 5.3 mmol/L Final     Creatinine   Date Value Ref Range Status   03/21/2016 1.28 (H) 0.66 - 1.25 mg/dL Final     BP Readings from Last 3 Encounters:   04/03/17 124/68   01/16/17 130/82   03/21/16 132/82

## 2017-10-17 NOTE — TELEPHONE ENCOUNTER
Routing refill request to provider for review/approval because:  Stacey given x1 and patient did not follow up, please advise  Labs not current:  Maksim HARRIS RN - BC

## 2017-10-19 RX ORDER — LISINOPRIL AND HYDROCHLOROTHIAZIDE 20; 25 MG/1; MG/1
TABLET ORAL
Qty: 30 TABLET | Refills: 0 | Status: SHIPPED | OUTPATIENT
Start: 2017-10-19 | End: 2017-11-09

## 2017-10-19 NOTE — TELEPHONE ENCOUNTER
Reason for Call:  Other prescription    Detailed comments:  Patient calling. He is leaving for out of town. He made an appointment for labs next week when he returns. Please refill his medication.     Phone Number Patient can be reached at: Cell number on file:    No relevant phone numbers on file.       Best Time:  Any     Can we leave a detailed message on this number? YES    Call taken on 10/19/2017 at 9:28 AM by Lissette Powell

## 2017-10-26 DIAGNOSIS — I10 HYPERTENSION GOAL BP (BLOOD PRESSURE) < 130/80: ICD-10-CM

## 2017-10-26 DIAGNOSIS — Z11.59 NEED FOR HEPATITIS C SCREENING TEST: ICD-10-CM

## 2017-10-26 LAB
ANION GAP SERPL CALCULATED.3IONS-SCNC: 7 MMOL/L (ref 3–14)
BUN SERPL-MCNC: 19 MG/DL (ref 7–30)
CALCIUM SERPL-MCNC: 8.8 MG/DL (ref 8.5–10.1)
CHLORIDE SERPL-SCNC: 99 MMOL/L (ref 94–109)
CHOLEST SERPL-MCNC: 216 MG/DL
CO2 SERPL-SCNC: 30 MMOL/L (ref 20–32)
CREAT SERPL-MCNC: 1.03 MG/DL (ref 0.66–1.25)
CREAT UR-MCNC: 161 MG/DL
GFR SERPL CREATININE-BSD FRML MDRD: 76 ML/MIN/1.7M2
GLUCOSE SERPL-MCNC: 287 MG/DL (ref 70–99)
HCV AB SERPL QL IA: NONREACTIVE
HDLC SERPL-MCNC: 35 MG/DL
LDLC SERPL CALC-MCNC: ABNORMAL MG/DL
LDLC SERPL DIRECT ASSAY-MCNC: 90 MG/DL
MICROALBUMIN UR-MCNC: 120 MG/L
MICROALBUMIN/CREAT UR: 74.53 MG/G CR (ref 0–17)
NONHDLC SERPL-MCNC: 181 MG/DL
POTASSIUM SERPL-SCNC: 4 MMOL/L (ref 3.4–5.3)
SODIUM SERPL-SCNC: 136 MMOL/L (ref 133–144)
TRIGL SERPL-MCNC: 680 MG/DL

## 2017-10-26 PROCEDURE — 86803 HEPATITIS C AB TEST: CPT | Performed by: FAMILY MEDICINE

## 2017-10-26 PROCEDURE — 83721 ASSAY OF BLOOD LIPOPROTEIN: CPT | Mod: 59 | Performed by: FAMILY MEDICINE

## 2017-10-26 PROCEDURE — 80061 LIPID PANEL: CPT | Performed by: FAMILY MEDICINE

## 2017-10-26 PROCEDURE — 80048 BASIC METABOLIC PNL TOTAL CA: CPT | Performed by: FAMILY MEDICINE

## 2017-10-26 PROCEDURE — 36415 COLL VENOUS BLD VENIPUNCTURE: CPT | Performed by: FAMILY MEDICINE

## 2017-10-26 PROCEDURE — 82043 UR ALBUMIN QUANTITATIVE: CPT | Performed by: FAMILY MEDICINE

## 2017-10-26 NOTE — LETTER
[facility]  [address] [place] [address]    November 3, 2017    Luther Mariee  [address] [address]          Dear Luther,    Your blood glucose and triglycerides are very high. This is suspicious for diabetes. Eat healthy foods like fruits, vegetables, chicken, fish, nuts, and low fat yogurt. Drink water and milk instead of pop and juice. Avoid sweets. Exercise regularly. Follow-up in clinic within 1 week for additional testing.     Results for orders placed or performed in visit on 10/26/17   Basic metabolic panel   Result Value Ref Range    Sodium 136 133 - 144 mmol/L    Potassium 4.0 3.4 - 5.3 mmol/L    Chloride 99 94 - 109 mmol/L    Carbon Dioxide 30 20 - 32 mmol/L    Anion Gap 7 3 - 14 mmol/L    Glucose 287 (H) 70 - 99 mg/dL    Urea Nitrogen 19 7 - 30 mg/dL    Creatinine 1.03 0.66 - 1.25 mg/dL    GFR Estimate 76 >60 mL/min/1.7m2    GFR Estimate If Black >90 >60 mL/min/1.7m2    Calcium 8.8 8.5 - 10.1 mg/dL   Albumin Random Urine Quantitative   Result Value Ref Range    Creatinine Urine 161 mg/dL    Albumin Urine mg/L 120 mg/L    Albumin Urine mg/g Cr 74.53 (H) 0 - 17 mg/g Cr   Lipid panel reflex to direct LDL   Result Value Ref Range    Cholesterol 216 (H) <200 mg/dL    Triglycerides 680 (H) <150 mg/dL    HDL Cholesterol 35 (L) >39 mg/dL    LDL Cholesterol Calculated  <100 mg/dL     Cannot estimate LDL when triglyceride exceeds 400 mg/dL    Non HDL Cholesterol 181 (H) <130 mg/dL   Hepatitis C Screen Reflex to HCV RNA Quant and Genotype   Result Value Ref Range    Hepatitis C Antibody Nonreactive NR^Nonreactive   LDL cholesterol direct   Result Value Ref Range    LDL Cholesterol Direct 90 <100 mg/dL       If you have any questions or concerns, please me or my clinic team at [phone].      Sincerely,        Shavon Dennison MD/anel

## 2017-10-27 NOTE — PROGRESS NOTES
Please call patient:  Your blood glucose and triglycerides are very high. This is suspicious for diabetes. Eat healthy foods like fruits, vegetables, chicken, fish, nuts, and low fat yogurt. Drink water and milk instead of pop and juice. Avoid sweets. Exercise regularly. Follow-up in clinic within 1 week for additional testing.     Shavon Dennison MD

## 2017-11-09 ENCOUNTER — MYC MEDICAL ADVICE (OUTPATIENT)
Dept: FAMILY MEDICINE | Facility: CLINIC | Age: 52
End: 2017-11-09

## 2017-11-09 DIAGNOSIS — R73.09 ELEVATED GLUCOSE: Primary | ICD-10-CM

## 2017-11-09 DIAGNOSIS — E78.5 HYPERLIPIDEMIA LDL GOAL <100: ICD-10-CM

## 2017-11-09 DIAGNOSIS — N18.2 CKD (CHRONIC KIDNEY DISEASE) STAGE 2, GFR 60-89 ML/MIN: ICD-10-CM

## 2017-11-09 DIAGNOSIS — I10 HYPERTENSION GOAL BP (BLOOD PRESSURE) < 130/80: ICD-10-CM

## 2017-11-09 RX ORDER — LISINOPRIL AND HYDROCHLOROTHIAZIDE 20; 25 MG/1; MG/1
1 TABLET ORAL DAILY
Qty: 30 TABLET | Refills: 0 | Status: SHIPPED | OUTPATIENT
Start: 2017-11-09 | End: 2017-12-27

## 2017-12-27 ENCOUNTER — TELEPHONE (OUTPATIENT)
Dept: FAMILY MEDICINE | Facility: CLINIC | Age: 52
End: 2017-12-27

## 2017-12-27 DIAGNOSIS — I10 HYPERTENSION GOAL BP (BLOOD PRESSURE) < 130/80: ICD-10-CM

## 2017-12-27 RX ORDER — LISINOPRIL AND HYDROCHLOROTHIAZIDE 20; 25 MG/1; MG/1
1 TABLET ORAL DAILY
Qty: 30 TABLET | Refills: 0 | Status: SHIPPED | OUTPATIENT
Start: 2017-12-27 | End: 2018-01-15

## 2017-12-27 NOTE — TELEPHONE ENCOUNTER
Please advise on refills for lisinopril-hydrochlorothiazide. Per TE on 12/16/17 it states needs to follow up for further refills. Patient has an appointment scheduled for 1/15/18.    Olya Katz RN - BC

## 2017-12-27 NOTE — TELEPHONE ENCOUNTER
Reason for Call:  Other prescription    Detailed comments: Pt calling to check status of refill request for blood pressure medications. Pt scheduled an appt for 1-15-18 as requested but pharmacy still doesn't have refill. [phone]. Please call pt [phone] to advise when done.    Phone Number Patient can be reached at: Home number on file [phone] (home)    Best Time: any    Can we leave a detailed message on this number? YES    Call taken on 12/27/2017 at 12:21 PM by Danette Hawthorne

## 2018-01-04 NOTE — PROGRESS NOTES
SUBJECTIVE:   Luther Mariee is a 52 year old male who presents to clinic today for the following health issues:    Hyperlipidemia Follow-Up      Rate your low fat/cholesterol diet?: fair    Taking statin?  No    Other lipid medications/supplements?:  none    Hypertension Follow-up      Outpatient blood pressures are being checked at home.  Results are 130/80.    Low Salt Diet: low salt    Weight:   Trending down.  Wt Readings from Last 4 Encounters:   01/15/18 209 lb (94.8 kg)   04/03/17 221 lb (100.2 kg)   01/16/17 220 lb 12.8 oz (100.2 kg)   03/21/16 216 lb 6.4 oz (98.2 kg)     Phq Score 2    H/o Aortic Dissection:  DILATED AORTIC ROOT / S/P Acute aortic dissection 2010 - prolonged hospitalization at [facility]    Last cardiac MR: 3/23/2016   Normal LV and RV size and function. Normally functioning bicuspid  aortic valve. Repaired Type A dissection with ascending aortic graft,  dilated aortic root and residual dissection flap extending from the  proximal aortic arch to the distal abdominal aorta. When directly  compared to the images from the previous MRI/MRA done at [facility] on 12/29/2014, there has been no significant change in the  cardiac size and function, or aortic size and morphology.      Amount of exercise or physical activity: 3-4 days/week for an average of 30-45 minutes    Problems taking medications regularly: No    Medication side effects: none  Diet: low salt and low fat/cholesterol    Problem list and histories reviewed & adjusted, as indicated.  Additional history: as documented    Patient Active Problem List   Diagnosis     Aortic dissection (H)     Splenic infarct     Hypertension goal BP (blood pressure) < 130/80     Health Care Home     Ascending aortic aneurysm (H)     SBO (small bowel obstruction)     Ischemic colitis (H)     Bicuspid aortic valve     bmi 30     Vitamin B12 deficiency     Vitamin D deficiency     Chronic pain     Hyperlipidemia LDL goal <100     H/O aortic  "dissection     CKD (chronic kidney disease) stage 2, GFR 60-89 ml/min     Past Surgical History:   Procedure Laterality Date     CHOLECYSTECTOMY, OPEN       COLONOSCOPY  2010    superficial ulcerws rt colon     ENDOVAS REPAIR, INFRARENL ABDOM AORTIC ANEURYSM/DISSECT; AKRCD-HMI-FUSGF/AORTO-UNIFEMORAL PROSTHESIS  2/10    aorta dissection     SMALL BOWEL RESECTION  2010    Dr. [place]       Social History   Substance Use Topics     Smoking status: Former Smoker     Quit date: 12/6/2001     Smokeless tobacco: Never Used     Alcohol use No     Family History   Problem Relation Age of Onset     Unknown/Adopted Mother      Unknown/Adopted Father      Unknown/Adopted Maternal Grandmother      Unknown/Adopted Maternal Grandfather      Unknown/Adopted Paternal Grandmother      Unknown/Adopted Paternal Grandfather          Reviewed and updated as needed this visit by Provider    ROS:  Constitutional, HEENT, cardiovascular, pulmonary, gi and gu systems are negative, except as otherwise noted.      OBJECTIVE:   /74  Pulse 65  Temp 96.9  F (36.1  C) (Oral)  Ht 5' 7.32\" (1.71 m)  Wt 209 lb (94.8 kg)  BMI 32.42 kg/m2  Body mass index is 32.42 kg/(m^2).  GENERAL: healthy, alert and no distress  NECK: no adenopathy and thyroid normal to palpation  RESP: lungs clear to auscultation - no rales, rhonchi or wheezes  CV: regular rate and rhythm, no murmur, click or rub, no peripheral edema   ABDOMEN: soft, nontender, no masses and bowel sounds normal  MS: no gross musculoskeletal defects noted, no edema  Diabetic foot exam: normal DP and PT pulses, no trophic changes or ulcerative lesions and normal sensory exam    Diagnostic Test Results:  none     ASSESSMENT/PLAN:   (I10) Hypertension goal BP (blood pressure) < 130/80  (primary encounter diagnosis)  Comment: BP at goal. Tolerating medication well. Refill.  Plan: lisinopril-hydrochlorothiazide         (PRINZIDE/ZESTORETIC) 20-25 MG per tablet    (Z86.79) H/O aortic " dissection  Comment: Stable  Plan: Continue follow up with cardiology    (R73.09) Other abnormal glucose  Comment: Past reading for blood sugar was above goal. Check A1c  Plan: HEMOGLOBIN A1C    (E78.5) Hyperlipidemia LDL goal <100  Comment: Recheck LDL  Plan: Lipid panel reflex to direct LDL Fasting, TSH         WITH FREE T4 REFLEX    (L30.9) Eczema, unspecified type  Comment: Recurrent dermatitis in extremities  Plan: mometasone (ELOCON) 0.1 % ointment    (Z12.11) Screen for colon cancer  Plan: Fecal colorectal cancer screen (FIT)    (Z13.89) Screening for diabetic peripheral neuropathy  Comment: Normal exam  Plan: FOOT EXAM  NO CHARGE [08945.114]    (Z23) Need for prophylactic vaccination and inoculation against influenza  Comment: Declines    Follow up in 6 months or sooner with concerns    Watson Bhagat MD  [facility]

## 2018-01-06 DIAGNOSIS — I10 HYPERTENSION GOAL BP (BLOOD PRESSURE) < 130/80: ICD-10-CM

## 2018-01-09 RX ORDER — CARVEDILOL 25 MG/1
TABLET ORAL
Qty: 180 TABLET | Refills: 0 | Status: SHIPPED | OUTPATIENT
Start: 2018-01-09 | End: 2018-04-10

## 2018-01-09 NOTE — TELEPHONE ENCOUNTER
Prescription approved per [facility] Refill Protocol.  Pt has upcoming appt.  Yolanda Anderson RN

## 2018-01-15 ENCOUNTER — OFFICE VISIT (OUTPATIENT)
Dept: FAMILY MEDICINE | Facility: CLINIC | Age: 53
End: 2018-01-15
Payer: COMMERCIAL

## 2018-01-15 VITALS
HEART RATE: 65 BPM | WEIGHT: 209 LBS | TEMPERATURE: 96.9 F | HEIGHT: 67 IN | BODY MASS INDEX: 32.8 KG/M2 | DIASTOLIC BLOOD PRESSURE: 74 MMHG | SYSTOLIC BLOOD PRESSURE: 130 MMHG

## 2018-01-15 DIAGNOSIS — I10 HYPERTENSION GOAL BP (BLOOD PRESSURE) < 130/80: Primary | ICD-10-CM

## 2018-01-15 DIAGNOSIS — R73.09 OTHER ABNORMAL GLUCOSE: ICD-10-CM

## 2018-01-15 DIAGNOSIS — Z12.11 SCREEN FOR COLON CANCER: ICD-10-CM

## 2018-01-15 DIAGNOSIS — E78.5 HYPERLIPIDEMIA LDL GOAL <100: ICD-10-CM

## 2018-01-15 DIAGNOSIS — Z23 NEED FOR PROPHYLACTIC VACCINATION AND INOCULATION AGAINST INFLUENZA: ICD-10-CM

## 2018-01-15 DIAGNOSIS — Z86.79 H/O AORTIC DISSECTION: ICD-10-CM

## 2018-01-15 DIAGNOSIS — L30.9 ECZEMA, UNSPECIFIED TYPE: ICD-10-CM

## 2018-01-15 DIAGNOSIS — Z13.89 SCREENING FOR DIABETIC PERIPHERAL NEUROPATHY: ICD-10-CM

## 2018-01-15 PROCEDURE — 99214 OFFICE O/P EST MOD 30 MIN: CPT | Performed by: FAMILY MEDICINE

## 2018-01-15 PROCEDURE — 99207 C FOOT EXAM  NO CHARGE: CPT | Performed by: FAMILY MEDICINE

## 2018-01-15 RX ORDER — LISINOPRIL AND HYDROCHLOROTHIAZIDE 20; 25 MG/1; MG/1
1 TABLET ORAL DAILY
Qty: 90 TABLET | Refills: 1 | Status: SHIPPED | OUTPATIENT
Start: 2018-01-15 | End: 2018-07-25

## 2018-01-15 RX ORDER — MOMETASONE FUROATE 1 MG/G
OINTMENT TOPICAL
Qty: 15 G | Refills: 2 | Status: SHIPPED | OUTPATIENT
Start: 2018-01-15 | End: 2022-01-17

## 2018-01-15 ASSESSMENT — ANXIETY QUESTIONNAIRES
5. BEING SO RESTLESS THAT IT IS HARD TO SIT STILL: NOT AT ALL
3. WORRYING TOO MUCH ABOUT DIFFERENT THINGS: NOT AT ALL
1. FEELING NERVOUS, ANXIOUS, OR ON EDGE: NOT AT ALL
6. BECOMING EASILY ANNOYED OR IRRITABLE: NOT AT ALL
2. NOT BEING ABLE TO STOP OR CONTROL WORRYING: NOT AT ALL
GAD7 TOTAL SCORE: 0
IF YOU CHECKED OFF ANY PROBLEMS ON THIS QUESTIONNAIRE, HOW DIFFICULT HAVE THESE PROBLEMS MADE IT FOR YOU TO DO YOUR WORK, TAKE CARE OF THINGS AT HOME, OR GET ALONG WITH OTHER PEOPLE: NOT DIFFICULT AT ALL
7. FEELING AFRAID AS IF SOMETHING AWFUL MIGHT HAPPEN: NOT AT ALL

## 2018-01-15 ASSESSMENT — PATIENT HEALTH QUESTIONNAIRE - PHQ9
5. POOR APPETITE OR OVEREATING: NOT AT ALL
SUM OF ALL RESPONSES TO PHQ QUESTIONS 1-9: 2

## 2018-01-15 NOTE — NURSING NOTE
"Chief Complaint   Patient presents with     Hypertension     Lipids     Health Maintenance     A1C, PHQ-9        Initial /74  Pulse 65  Temp 96.9  F (36.1  C) (Oral)  Ht 5' 7.32\" (1.71 m)  Wt 209 lb (94.8 kg)  BMI 32.42 kg/m2 Estimated body mass index is 32.42 kg/(m^2) as calculated from the following:    Height as of this encounter: 5' 7.32\" (1.71 m).    Weight as of this encounter: 209 lb (94.8 kg).  Medication Reconciliation: complete   Heide MAYA MA      "

## 2018-01-15 NOTE — MR AVS SNAPSHOT
After Visit Summary   1/15/2018    Luther Mariee    MRN: [id number]           Patient Information     Date Of Birth          1965        Visit Information        Provider Department      1/15/2018 12:00 PM Watson Bhagat MD [facility]        Today's Diagnoses     Hypertension goal BP (blood pressure) < 130/80    -  1    H/O aortic dissection        Other abnormal glucose        Hyperlipidemia LDL goal <100        Screen for colon cancer        Screening for diabetic peripheral neuropathy        Need for prophylactic vaccination and inoculation against influenza        Eczema, unspecified type          Care Instructions    [place]    If you have any questions regarding to your visit please contact your care team:       Team Purple:   Clinic Hours Telephone Number   Dr. Chante Ardon   7am-7pm  Monday - Thursday   7am-5pm  Fridays  [phone]  (Appointment scheduling available 24/7)    Questions about your Visit?   Team Line:  [phone]   Urgent Care - [place] and [place] - 11am-9pm Monday-Friday Saturday-Sunday- 9am-5pm   [place] - 5pm-9pm Monday-Friday Saturday-Sunday- 9am-5pm  [phone] - [place]   [phone] - [place]       What options do I have for visits at the clinic other than the traditional office visit?  To expand how we care for you, many of our providers are utilizing electronic visits (e-visits) and telephone visits, when medically appropriate, for interactions with their patients rather than a visit in the clinic.   We also offer nurse visits for many medical concerns. Just like any other service, we will bill your insurance company for this type of visit based on time spent on the phone with your provider. Not all insurance companies cover these visits. Please check with your medical insurance if this type of visit is covered. You will be  responsible for any charges that are not paid by your insurance.      E-visits via [employer]:  generally incur a $35.00 fee.  Telephone visits:  Time spent on the phone: *charged based on time that is spent on the phone in increments of 10 minutes. Estimated cost:   5-10 mins $30.00   11-20 mins. $59.00   21-30 mins. $85.00     Use [email or web address] (secure email communication and access to your chart) to send your primary care provider a message or make an appointment. Ask someone on your Team how to sign up for Kayent[employer].  For a Price Quote for your services, please call our i4.ms Line at [phone].  As always, Thank you for trusting us with your health care needs!    Heide MAYA MA            Follow-ups after your visit        Future tests that were ordered for you today     Open Future Orders        Priority Expected Expires Ordered    Fecal colorectal cancer screen (FIT) Routine 2/5/2018 4/9/2018 1/15/2018    HEMOGLOBIN A1C Routine  1/15/2019 1/15/2018    Lipid panel reflex to direct LDL Fasting Routine  1/15/2019 1/15/2018    TSH WITH FREE T4 REFLEX Routine  1/15/2019 1/15/2018            Who to contact     If you have questions or need follow up information about today's clinic visit or your schedule please contact [facility] directly at [phone].  Normal or non-critical lab and imaging results will be communicated to you by [email or web address], letter or phone within 4 business days after the clinic has received the results. If you do not hear from us within 7 days, please contact the clinic through [email or web address] or phone. If you have a critical or abnormal lab result, we will notify you by phone as soon as possible.  Submit refill requests through Kayentis or call your pharmacy and they will forward the refill request to us. Please allow 3 business days for your refill to be completed.          Additional Information About Your Visit        [email or web address] Information     Kayentis gives you secure access to your  "electronic health record. If you see a primary care provider, you can also send messages to your care team and make appointments. If you have questions, please call your primary care clinic.  If you do not have a primary care provider, please call [phone] and they will assist you.        Care EveryWhere ID     This is your Care EveryWhere ID. This could be used by other organizations to access your [facility] medical records  [id number]        Your Vitals Were     Pulse Temperature Height BMI (Body Mass Index)          65 96.9  F (36.1  C) (Oral) 5' 7.32\" (1.71 m) 32.42 kg/m2         Blood Pressure from Last 3 Encounters:   01/15/18 130/74   04/03/17 124/68   01/16/17 130/82    Weight from Last 3 Encounters:   01/15/18 209 lb (94.8 kg)   04/03/17 221 lb (100.2 kg)   01/16/17 220 lb 12.8 oz (100.2 kg)              We Performed the Following     FOOT EXAM  NO CHARGE [87361.114]          Today's Medication Changes          These changes are accurate as of: 1/15/18 12:43 PM.  If you have any questions, ask your nurse or doctor.               These medicines have changed or have updated prescriptions.        Dose/Directions    mometasone 0.1 % ointment   Commonly known as:  ELOCON   This may have changed:  additional instructions   Used for:  Eczema, unspecified type   Changed by:  Watson Bhagat MD        Apply sparingly to affected area twice daily for 14 days.  Do not apply to face.   Quantity:  15 g   Refills:  2            Where to get your medicines      These medications were sent to [place] [address] [place] [facility] - [address]  [address] [place] [address]     Phone:  [phone]     lisinopril-hydrochlorothiazide 20-25 MG per tablet    mometasone 0.1 % ointment                Primary Care Provider Office Phone # Fax #    Watson Bhagat [phone] [address] [place] [address]  [place] [address]        Equal Access to Services     OLIVA WILLS AH: James angulo " nupur Ivory, wacharlesda luqadaha, qaybta karicardo villatoro, zaid marrerokeo margarita. So [facility] [phone].    ATENCIÓN: Si arlettela diogenes, tiene a bosch disposición servicios gratuitos de asistencia lingüística. Anni al [phone].    We comply with applicable federal civil rights laws and [place] laws. We do not discriminate on the basis of race, color, national origin, age, disability, sex, sexual orientation, or gender identity.            Thank you!     Thank you for choosing [facility]  for your care. Our goal is always to provide you with excellent care. Hearing back from our patients is one way we can continue to improve our services. Please take a few minutes to complete the written survey that you may receive in the mail after your visit with us. Thank you!             Your Updated Medication List - Protect others around you: Learn how to safely use, store and throw away your medicines at www.disposemymeds.org.          This list is accurate as of: 1/15/18 12:43 PM.  Always use your most recent med list.                   Brand Name Dispense Instructions for use Diagnosis    aspirin 81 MG tablet      Take 1 tablet by mouth daily.        carvedilol 25 MG tablet    COREG    180 tablet    TAKE 1 TABLET BY MOUTH 2 TIMES DAILY WITH MEALS    Hypertension goal BP (blood pressure) < 130/80       ibuprofen 200 MG tablet   Generic drug:  ibuprofen      Take 800 mg by mouth.        lisinopril-hydrochlorothiazide 20-25 MG per tablet    PRINZIDE/ZESTORETIC    90 tablet    Take 1 tablet by mouth daily    Hypertension goal BP (blood pressure) < 130/80       mometasone 0.1 % ointment    ELOCON    15 g    Apply sparingly to affected area twice daily for 14 days.  Do not apply to face.    Eczema, unspecified type       valACYclovir 1000 mg tablet    VALTREX    4 tablet    Take 2 tablets (2,000 mg) by mouth 2 times daily    Recurrent cold sores

## 2018-01-15 NOTE — PATIENT INSTRUCTIONS
[facility]    If you have any questions regarding to your visit please contact your care team:       Team Purple:   Clinic Hours Telephone Number   Dr. Chante Ardon   7am-7pm  Monday - Thursday   7am-5pm  Fridays  [phone]  (Appointment scheduling available 24/7)    Questions about your Visit?   Team Line:  [phone]   Urgent Care - [place] and [facility] - 11am-9pm Monday-Friday Saturday-Sunday- 9am-5pm   [place] - 5pm-9pm Monday-Friday Saturday-Sunday- 9am-5pm  [phone] - [facility]  [phone] - [place]       What options do I have for visits at the clinic other than the traditional office visit?  To expand how we care for you, many of our providers are utilizing electronic visits (e-visits) and telephone visits, when medically appropriate, for interactions with their patients rather than a visit in the clinic.   We also offer nurse visits for many medical concerns. Just like any other service, we will bill your insurance company for this type of visit based on time spent on the phone with your provider. Not all insurance companies cover these visits. Please check with your medical insurance if this type of visit is covered. You will be responsible for any charges that are not paid by your insurance.      E-visits via Any.DO:  generally incur a $35.00 fee.  Telephone visits:  Time spent on the phone: *charged based on time that is spent on the phone in increments of 10 minutes. Estimated cost:   5-10 mins $30.00   11-20 mins. $59.00   21-30 mins. $85.00     Use Nuubohart (secure email communication and access to your chart) to send your primary care provider a message or make an appointment. Ask someone on your Team how to sign up for Any.DO.  For a Price Quote for your services, please call our Consumer Price Line at [phone].  As always, Thank you for trusting us with your health care  needs!    Heide MAYA MA

## 2018-01-16 ASSESSMENT — ANXIETY QUESTIONNAIRES: GAD7 TOTAL SCORE: 0

## 2018-04-10 DIAGNOSIS — I10 HYPERTENSION GOAL BP (BLOOD PRESSURE) < 130/80: ICD-10-CM

## 2018-04-10 RX ORDER — CARVEDILOL 25 MG/1
TABLET ORAL
Qty: 180 TABLET | Refills: 0 | Status: SHIPPED | OUTPATIENT
Start: 2018-04-10 | End: 2018-07-11

## 2018-04-10 NOTE — TELEPHONE ENCOUNTER
Signed Prescriptions:                        Disp   Refills    carvedilol (COREG) 25 MG tablet            180 ta*0        Sig: TAKE 1 TABLET BY MOUTH 2 TIMES DAILY WITH MEALS  Authorizing Provider: FERNANDO MITCHELL  Ordering User: NATTY BALDWIN RN refilled medication per [facility] Refill Protocol.     Natty Baldwin RN

## 2018-07-25 DIAGNOSIS — I10 HYPERTENSION GOAL BP (BLOOD PRESSURE) < 130/80: ICD-10-CM

## 2018-07-25 RX ORDER — LISINOPRIL AND HYDROCHLOROTHIAZIDE 20; 25 MG/1; MG/1
1 TABLET ORAL DAILY
Qty: 90 TABLET | Refills: 0 | Status: SHIPPED | OUTPATIENT
Start: 2018-07-25 | End: 2018-10-25

## 2018-07-25 NOTE — TELEPHONE ENCOUNTER
Signed Prescriptions:                        Disp   Refills    lisinopril-hydrochlorothiazide (PRINZIDE/Z*90 tab*0        Sig: Take 1 tablet by mouth daily Office visit needed for           further refills.  Authorizing Provider: FERNANDO MITCHELL  Ordering User: NATTY BALDWIN    Medication is being filled for 1 time refill only due to:  Patient needs to be seen because per LOV note 1/15/2018, patient to f/u in 6 mths. .    Natty Baldwin RN on 7/25/2018 at 3:26 PM

## 2018-07-31 DIAGNOSIS — I10 HYPERTENSION GOAL BP (BLOOD PRESSURE) < 130/80: ICD-10-CM

## 2018-07-31 RX ORDER — LISINOPRIL AND HYDROCHLOROTHIAZIDE 20; 25 MG/1; MG/1
TABLET ORAL
Qty: 90 TABLET | Refills: 1 | OUTPATIENT
Start: 2018-07-31

## 2018-09-15 DIAGNOSIS — I10 HYPERTENSION GOAL BP (BLOOD PRESSURE) < 130/80: ICD-10-CM

## 2018-09-17 RX ORDER — CARVEDILOL 25 MG/1
25 TABLET ORAL 2 TIMES DAILY WITH MEALS
Qty: 60 TABLET | Refills: 2 | Status: SHIPPED | OUTPATIENT
Start: 2018-09-17 | End: 2018-12-10

## 2018-10-25 DIAGNOSIS — I10 HYPERTENSION GOAL BP (BLOOD PRESSURE) < 130/80: ICD-10-CM

## 2018-10-25 RX ORDER — LISINOPRIL AND HYDROCHLOROTHIAZIDE 20; 25 MG/1; MG/1
TABLET ORAL
Qty: 90 TABLET | Refills: 0 | Status: SHIPPED | OUTPATIENT
Start: 2018-10-25 | End: 2018-12-28

## 2018-12-10 DIAGNOSIS — I10 HYPERTENSION GOAL BP (BLOOD PRESSURE) < 130/80: ICD-10-CM

## 2018-12-12 RX ORDER — CARVEDILOL 25 MG/1
25 TABLET ORAL 2 TIMES DAILY WITH MEALS
Qty: 60 TABLET | Refills: 2 | Status: SHIPPED | OUTPATIENT
Start: 2018-12-12 | End: 2019-02-04

## 2018-12-27 NOTE — PROGRESS NOTES
SUBJECTIVE:   Luther Mairee is a 53 year old male who presents to clinic today for the following health issues:    Hypertension Follow-up      Outpatient blood pressures are not being checked.    Low Salt Diet: no added salt    Amount of exercise or physical activity: 6-7 days/week for an average of 30-45 minutes    Problems taking medications regularly: No    Medication side effects: none    Diet: low salt    Luther presents today for HTN follow-up visit.  Currently taking prinzide 20-25mg and has been taking medications as prescribed.  No reported side effects.  Blood pressure ranges are normal. Patient has been exercising on a regular basis and is monitoring sodium intake.  He has been gong on the elliptical for about 30 minutes per day.  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    Chronic fatigue - he states that for the past year and a half he has felt run down and fatigued, he gets enough sleep, no signs of sleep apnea, fatigue is associated with chronic pain for which he had been taking cymbalta and wellbutrin, which had not helped.  He states that if he takes vicodin he feels normal, however understands that this concept in itself is not normal.    He would like to discuss weight loss as well.    Problem list and histories reviewed & adjusted, as indicated.  Additional history: as documented    BP Readings from Last 3 Encounters:   12/28/18 124/76   01/15/18 130/74   04/03/17 124/68    Wt Readings from Last 3 Encounters:   12/28/18 93.2 kg (205 lb 6.4 oz)   01/15/18 94.8 kg (209 lb)   04/03/17 100.2 kg (221 lb)            Reviewed and updated as needed this visit by clinical staff  Tobacco  Allergies  Meds  Problems  Med Hx  Surg Hx  Fam Hx       Reviewed and updated as needed this visit by Provider  Tobacco  Allergies  Meds  Problems  Med Hx  Surg Hx  Fam Hx         ROS:  Constitutional, HEENT,  cardiovascular, pulmonary, gi and gu systems are negative, except as otherwise noted.    OBJECTIVE:     /76   Pulse 59   Temp 96.6  F (35.9  C) (Oral)   Resp 16   Wt 93.2 kg (205 lb 6.4 oz)   SpO2 97%   BMI 31.86 kg/m    Body mass index is 31.86 kg/m .  GENERAL: healthy, alert and no distress  EYES: Eyes grossly normal to inspection, PERRL and conjunctivae and sclerae normal  NECK: no adenopathy, no asymmetry, masses, or scars and thyroid normal to palpation  RESP: lungs clear to auscultation - no rales, rhonchi or wheezes  CV: regular rate and rhythm, normal S1 S2, no S3 or S4, no murmur, click or rub, no peripheral edema and peripheral pulses strong  SKIN: no suspicious lesions or rashes  PSYCH: mentation appears normal, affect normal/bright    ASSESSMENT/PLAN:     1. History of prediabetes  Will obtain lab workup.  Patient has gained a significant amount of weight, therefore suspect diabetes to have worsened and is likely the cause of his fatigue.  - HEMOGLOBIN A1C  - Lipid panel reflex to direct LDL Fasting  - Albumin Random Urine Quantitative with Creat Ratio  - TSH WITH FREE T4 REFLEX  - Comprehensive metabolic panel  - LDL cholesterol direct  - LDL cholesterol direct    2. Chronic fatigue  As above  - HEMOGLOBIN A1C  - Albumin Random Urine Quantitative with Creat Ratio  - TSH WITH FREE T4 REFLEX    3. Screen for colon cancer    - ABSTRACT COLOGUARD-NO CHARGE    4. Screening for HIV (human immunodeficiency virus)      5. Screening for diabetic peripheral neuropathy    - FOOT EXAM  NO CHARGE [77183.114]    6. Hypertension goal BP (blood pressure) < 130/80  Acceptable control at home, continue to monitor, no dose changes at this time  - HEMOGLOBIN A1C  - Lipid panel reflex to direct LDL Fasting  - Albumin Random Urine Quantitative with Creat Ratio  - Comprehensive metabolic panel    7. Vitamin D deficiency  - Vitamin D deficiency screening    Follow-up in 1 week    Waldo Ardon MD  [place]  [facility]

## 2018-12-28 ENCOUNTER — OFFICE VISIT (OUTPATIENT)
Dept: FAMILY MEDICINE | Facility: CLINIC | Age: 53
End: 2018-12-28
Payer: COMMERCIAL

## 2018-12-28 VITALS
DIASTOLIC BLOOD PRESSURE: 76 MMHG | RESPIRATION RATE: 16 BRPM | WEIGHT: 205.4 LBS | TEMPERATURE: 96.6 F | HEART RATE: 59 BPM | SYSTOLIC BLOOD PRESSURE: 124 MMHG | BODY MASS INDEX: 31.86 KG/M2 | OXYGEN SATURATION: 97 %

## 2018-12-28 DIAGNOSIS — Z11.4 SCREENING FOR HIV (HUMAN IMMUNODEFICIENCY VIRUS): ICD-10-CM

## 2018-12-28 DIAGNOSIS — E11.9 TYPE 2 DIABETES MELLITUS WITHOUT COMPLICATION, WITHOUT LONG-TERM CURRENT USE OF INSULIN (H): ICD-10-CM

## 2018-12-28 DIAGNOSIS — I10 HYPERTENSION GOAL BP (BLOOD PRESSURE) < 130/80: ICD-10-CM

## 2018-12-28 DIAGNOSIS — Z13.89 SCREENING FOR DIABETIC PERIPHERAL NEUROPATHY: ICD-10-CM

## 2018-12-28 DIAGNOSIS — Z87.898 HISTORY OF PREDIABETES: Primary | ICD-10-CM

## 2018-12-28 DIAGNOSIS — R53.82 CHRONIC FATIGUE: ICD-10-CM

## 2018-12-28 DIAGNOSIS — I71.00 DISSECTION OF AORTA, UNSPECIFIED PORTION OF AORTA (H): ICD-10-CM

## 2018-12-28 DIAGNOSIS — Z12.11 SCREEN FOR COLON CANCER: ICD-10-CM

## 2018-12-28 DIAGNOSIS — E55.9 VITAMIN D DEFICIENCY: ICD-10-CM

## 2018-12-28 LAB
ALBUMIN SERPL-MCNC: 3.4 G/DL (ref 3.4–5)
ALP SERPL-CCNC: 93 U/L (ref 40–150)
ALT SERPL W P-5'-P-CCNC: 42 U/L (ref 0–70)
ANION GAP SERPL CALCULATED.3IONS-SCNC: 7 MMOL/L (ref 3–14)
AST SERPL W P-5'-P-CCNC: 21 U/L (ref 0–45)
BILIRUB SERPL-MCNC: 0.5 MG/DL (ref 0.2–1.3)
BUN SERPL-MCNC: 13 MG/DL (ref 7–30)
CALCIUM SERPL-MCNC: 9 MG/DL (ref 8.5–10.1)
CHLORIDE SERPL-SCNC: 99 MMOL/L (ref 94–109)
CHOLEST SERPL-MCNC: 262 MG/DL
CO2 SERPL-SCNC: 28 MMOL/L (ref 20–32)
CREAT SERPL-MCNC: 0.86 MG/DL (ref 0.66–1.25)
CREAT UR-MCNC: 146 MG/DL
DEPRECATED CALCIDIOL+CALCIFEROL SERPL-MC: 11 UG/L (ref 20–75)
GFR SERPL CREATININE-BSD FRML MDRD: >90 ML/MIN/{1.73_M2}
GLUCOSE SERPL-MCNC: 272 MG/DL (ref 70–99)
HBA1C MFR BLD: 11.2 % (ref 0–5.6)
HDLC SERPL-MCNC: 39 MG/DL
LDLC SERPL CALC-MCNC: ABNORMAL MG/DL
LDLC SERPL DIRECT ASSAY-MCNC: 97 MG/DL
MICROALBUMIN UR-MCNC: 185 MG/L
MICROALBUMIN/CREAT UR: 126.71 MG/G CR (ref 0–17)
NONHDLC SERPL-MCNC: 223 MG/DL
POTASSIUM SERPL-SCNC: 3.6 MMOL/L (ref 3.4–5.3)
PROT SERPL-MCNC: 7.8 G/DL (ref 6.8–8.8)
SODIUM SERPL-SCNC: 134 MMOL/L (ref 133–144)
TRIGL SERPL-MCNC: 892 MG/DL
TSH SERPL DL<=0.005 MIU/L-ACNC: 1.06 MU/L (ref 0.4–4)

## 2018-12-28 PROCEDURE — 83721 ASSAY OF BLOOD LIPOPROTEIN: CPT | Mod: 59 | Performed by: FAMILY MEDICINE

## 2018-12-28 PROCEDURE — 36415 COLL VENOUS BLD VENIPUNCTURE: CPT | Performed by: FAMILY MEDICINE

## 2018-12-28 PROCEDURE — 99207 C FOOT EXAM  NO CHARGE: CPT | Performed by: FAMILY MEDICINE

## 2018-12-28 PROCEDURE — 82306 VITAMIN D 25 HYDROXY: CPT | Performed by: FAMILY MEDICINE

## 2018-12-28 PROCEDURE — 84443 ASSAY THYROID STIM HORMONE: CPT | Performed by: FAMILY MEDICINE

## 2018-12-28 PROCEDURE — 82043 UR ALBUMIN QUANTITATIVE: CPT | Performed by: FAMILY MEDICINE

## 2018-12-28 PROCEDURE — 80061 LIPID PANEL: CPT | Performed by: FAMILY MEDICINE

## 2018-12-28 PROCEDURE — 83036 HEMOGLOBIN GLYCOSYLATED A1C: CPT | Performed by: FAMILY MEDICINE

## 2018-12-28 PROCEDURE — 80053 COMPREHEN METABOLIC PANEL: CPT | Performed by: FAMILY MEDICINE

## 2018-12-28 PROCEDURE — 99214 OFFICE O/P EST MOD 30 MIN: CPT | Performed by: FAMILY MEDICINE

## 2018-12-28 RX ORDER — LISINOPRIL AND HYDROCHLOROTHIAZIDE 20; 25 MG/1; MG/1
TABLET ORAL
Qty: 90 TABLET | Refills: 0 | Status: CANCELLED | OUTPATIENT
Start: 2018-12-28

## 2018-12-28 NOTE — PATIENT INSTRUCTIONS
[facility]    If you have any questions regarding to your visit please contact your care team:       Team Purple:   Clinic Hours Telephone Number   Dr. Chante Ardon   7am-7pm  Monday - Thursday   7am-5pm  Fridays  [phone]  (Appointment scheduling available 24/7)   Urgent Care - [place] and [facility] - 11am-9pm Monday-Friday Saturday-Sunday- 9am-5pm   [place] - 5pm-9pm Monday-Friday Saturday-Sunday- 9am-5pm  [phone] - [place]  [phone] - [place]       What options do I have for a visit other than an office visit? We offer electronic visits (e-visits) and telephone visits, when medically appropriate.  Please check with your medical insurance to see if these types of visits are covered, as you will be responsible for any charges that are not paid by your insurance.      You can use [employer] (secure electronic communication) to access to your chart, send your primary care provider a message, or make an appointment. Ask a team member how to get started.     For a price quote for your services, please call our Consumer Price Line at [phone] or our Imaging Cost estimation line at [phone] (for imaging tests).

## 2019-02-04 DIAGNOSIS — I10 HYPERTENSION GOAL BP (BLOOD PRESSURE) < 130/80: ICD-10-CM

## 2019-02-04 RX ORDER — CARVEDILOL 25 MG/1
25 TABLET ORAL 2 TIMES DAILY WITH MEALS
Qty: 60 TABLET | Refills: 0 | Status: SHIPPED | OUTPATIENT
Start: 2019-02-04 | End: 2019-04-17

## 2019-02-04 NOTE — TELEPHONE ENCOUNTER
Requested Prescriptions   Pending Prescriptions Disp Refills     carvedilol (COREG) 25 MG tablet 60 tablet 2     Sig: Take 1 tablet (25 mg) by mouth 2 times daily (with meals)    There is no refill protocol information for this order

## 2019-02-19 ENCOUNTER — MYC MEDICAL ADVICE (OUTPATIENT)
Dept: FAMILY MEDICINE | Facility: CLINIC | Age: 54
End: 2019-02-19

## 2019-02-19 DIAGNOSIS — Z86.79 HISTORY OF AORTIC DISSECTION: Primary | ICD-10-CM

## 2019-02-19 DIAGNOSIS — I71.21 ASCENDING AORTIC ANEURYSM (H): ICD-10-CM

## 2019-02-22 ENCOUNTER — TELEPHONE (OUTPATIENT)
Dept: FAMILY MEDICINE | Facility: CLINIC | Age: 54
End: 2019-02-22

## 2019-02-22 NOTE — LETTER
February 22, 2019          Luther Mariee,  [address] [address]        Dear Luther Mariee      Monitoring and managing your preventative and chronic health conditions are very important to us. Our records indicate that you have not scheduled for Diabetic Check  which was recommended by Dr. Russell      If you have received your health care elsewhere, please call the clinic so the information can be documented in your chart.    Please call [phone] or message us through your SavySwap account to schedule an appointment or provide information for your chart.     Feel free to contact us if you have any questions or concerns!    I look forward to seeing you and working with you on your health care needs.     Sincerely,       Your [facility] Team/LS

## 2019-02-22 NOTE — TELEPHONE ENCOUNTER
Panel Management Review      Patient has the following on his problem list:     Diabetes    ASA: Passed    Last A1C  Lab Results   Component Value Date    A1C 11.2 12/28/2018    A1C 6.5 08/31/2015     A1C tested: FAILED    Last LDL:    Lab Results   Component Value Date    CHOL 262 12/28/2018     Lab Results   Component Value Date    HDL 39 12/28/2018     Lab Results   Component Value Date    LDL  12/28/2018     Cannot estimate LDL when triglyceride exceeds 400 mg/dL    LDL 97 12/28/2018     Lab Results   Component Value Date    TRIG 892 12/28/2018     Lab Results   Component Value Date    CHOLHDLRATIO 5.5 09/06/2012     Lab Results   Component Value Date    NHDL 223 12/28/2018       Is the patient on a Statin? NO             Is the patient on Aspirin? YES    Medications     Salicylates     aspirin 81 MG tablet             Last three blood pressure readings:  BP Readings from Last 3 Encounters:   12/28/18 124/76   01/15/18 130/74   04/03/17 124/68       Date of last diabetes office visit: 12/28/2018     Tobacco History:     History   Smoking Status     Former Smoker     Quit date: 12/6/2001   Smokeless Tobacco     Never Used         Hypertension   Last three blood pressure readings:  BP Readings from Last 3 Encounters:   12/28/18 124/76   01/15/18 130/74   04/03/17 124/68     Blood pressure: Passed    HTN Guidelines:  Age 18-59 BP range:  Less than 140/90  Age 60-85 with Diabetes:  Less than 140/90  Age 60-85 without Diabetes:  less than 150/90      Composite cancer screening  Chart review shows that this patient is due/due soon for the following Colonoscopy  Summary:    Patient is due/failing the following:   COLONOSCOPY and PHQ9    Action needed:   Patient needs office visit for diabetes follow up.    Type of outreach:    Sent letter.    Questions for provider review:    None                                                                                                                                    Ls      Chart routed to none   .

## 2019-02-28 ENCOUNTER — ANCILLARY PROCEDURE (OUTPATIENT)
Dept: MRI IMAGING | Facility: CLINIC | Age: 54
End: 2019-02-28
Attending: FAMILY MEDICINE
Payer: COMMERCIAL

## 2019-02-28 DIAGNOSIS — Z86.79 HISTORY OF AORTIC DISSECTION: ICD-10-CM

## 2019-02-28 DIAGNOSIS — I71.21 ASCENDING AORTIC ANEURYSM (H): ICD-10-CM

## 2019-02-28 PROCEDURE — A9585 GADOBUTROL INJECTION: HCPCS | Performed by: FAMILY MEDICINE

## 2019-02-28 PROCEDURE — 71555 MRI ANGIO CHEST W OR W/O DYE: CPT | Performed by: RADIOLOGY

## 2019-02-28 RX ORDER — GADOBUTROL 604.72 MG/ML
10 INJECTION INTRAVENOUS ONCE
Status: COMPLETED | OUTPATIENT
Start: 2019-02-28 | End: 2019-02-28

## 2019-02-28 RX ADMIN — GADOBUTROL 10 ML: 604.72 INJECTION INTRAVENOUS at 10:38

## 2019-04-09 ENCOUNTER — TRANSFERRED RECORDS (OUTPATIENT)
Dept: HEALTH INFORMATION MANAGEMENT | Facility: CLINIC | Age: 54
End: 2019-04-09

## 2019-04-09 LAB — COLOGUARD-ABSTRACT: NEGATIVE

## 2019-04-17 DIAGNOSIS — I10 HYPERTENSION GOAL BP (BLOOD PRESSURE) < 130/80: ICD-10-CM

## 2019-04-17 RX ORDER — CARVEDILOL 25 MG/1
TABLET ORAL
Qty: 60 TABLET | Refills: 0 | Status: SHIPPED | OUTPATIENT
Start: 2019-04-17 | End: 2019-05-24

## 2019-04-17 NOTE — TELEPHONE ENCOUNTER
Patient is due for appointment for further refills. Please call and schedule.     Zaina Matthews RN   right

## 2019-04-19 ENCOUNTER — TELEPHONE (OUTPATIENT)
Dept: FAMILY MEDICINE | Facility: CLINIC | Age: 54
End: 2019-04-19

## 2019-04-19 NOTE — TELEPHONE ENCOUNTER
Cologuard results have been received.     The results are:Negative    Result Date: 04/19/2019    Results have been placed in provider basket- provider to review and sign off on results. Please send back to team with OK to send result letter and any additional follow-up needed.      will send a copy of results to scanning.   Gretchen Stubbs,

## 2019-04-19 NOTE — LETTER
April 23, 2019      Luther Mariee  [address] [address]      Dear Luther,    The result of your recent Cologuard testing was negative. A negative result means that Cologuard did not detect significant levels of DNA and/or hemoglobin biomarkers in the stool which are associated with colon cancer or precancer.  Thank you for completing your screening, your next screening should be completed in 3 years.  If you have any questions or concerns, please contact your care team at [phone].     Sincerely,      Waldo Russell MD/dt

## 2019-05-22 ENCOUNTER — MYC REFILL (OUTPATIENT)
Dept: FAMILY MEDICINE | Facility: CLINIC | Age: 54
End: 2019-05-22

## 2019-05-22 DIAGNOSIS — I10 HYPERTENSION GOAL BP (BLOOD PRESSURE) < 130/80: ICD-10-CM

## 2019-05-22 RX ORDER — CARVEDILOL 25 MG/1
TABLET ORAL
Qty: 60 TABLET | Refills: 0 | Status: CANCELLED | OUTPATIENT
Start: 2019-05-22

## 2019-05-24 ENCOUNTER — ANCILLARY PROCEDURE (OUTPATIENT)
Dept: CARDIOLOGY | Facility: CLINIC | Age: 54
End: 2019-05-24
Attending: FAMILY MEDICINE
Payer: COMMERCIAL

## 2019-05-24 DIAGNOSIS — I71.9 AORTIC ANEURYSM WITHOUT RUPTURE, UNSPECIFIED PORTION OF AORTA (H): ICD-10-CM

## 2019-05-24 PROCEDURE — 93306 TTE W/DOPPLER COMPLETE: CPT | Performed by: INTERNAL MEDICINE

## 2019-06-21 ENCOUNTER — TELEPHONE (OUTPATIENT)
Dept: FAMILY MEDICINE | Facility: CLINIC | Age: 54
End: 2019-06-21

## 2019-06-23 ENCOUNTER — NURSE TRIAGE (OUTPATIENT)
Dept: NURSING | Facility: CLINIC | Age: 54
End: 2019-06-23

## 2019-06-23 NOTE — TELEPHONE ENCOUNTER
Reason for call;  Pt called. Traveling on  6/25/19 going to [place] and [place].  No current  Symptoms .   Recommendation / teaching ; FNA recommended Travel clinic and sent to  for information appt.      Caller Verbalizes understanding and denies further questions and will call back if further symptoms to triage or questions  .  Kylie Olivia RN  - [place] Nurse Advisor

## 2019-06-25 PROBLEM — E11.8 TYPE 2 DIABETES MELLITUS WITH COMPLICATION, WITHOUT LONG-TERM CURRENT USE OF INSULIN (H): Status: ACTIVE | Noted: 2019-06-25

## 2019-07-19 NOTE — PROGRESS NOTES
Subjective     Luther Mariee is a 53 year old male who presents to clinic today for the following health issues:    HPI   Diabetes Follow-up      How often are you checking your blood sugar? Not at all    What time of day are you checking your blood sugars (select all that apply)?      Have you had any blood sugars above 200?      Have you had any blood sugars below 70?      What symptoms do you notice when your blood sugar is low?  None    What concerns do you have today about your diabetes? None     Do you have any of these symptoms? (Select all that apply)  No numbness or tingling in feet.  No redness, sores or blisters on feet.  No complaints of excessive thirst.  No reports of blurry vision.  No significant changes to weight.     Have you had a diabetic eye exam in the last 12 months? UTD    Diabetes Management Resources    Hyperlipidemia Follow-Up      Are you having any of the following symptoms? (Select all that apply)  No complaints of shortness of breath, chest pain or pressure.  No increased sweating or nausea with activity.  No left-sided neck or arm pain.  No complaints of pain in calves when walking 1-2 blocks.    Are you regularly taking any medication or supplement to lower your cholesterol?   No    Are you having muscle aches or other side effects that you think could be caused by your cholesterol lowering medication?  Yes- NA    Hypertension Follow-up      Do you check your blood pressure regularly outside of the clinic? Yes     Are you following a low salt diet? No    Are your blood pressures ever more than 140 on the top number (systolic) OR more   than 90 on the bottom number (diastolic), for example 140/90? No    BP Readings from Last 2 Encounters:   07/22/19 118/58   12/28/18 124/76     Hemoglobin A1C (%)   Date Value   07/22/2019 11.1 (H)   12/28/2018 11.2 (H)     LDL Cholesterol Calculated (mg/dL)   Date Value   07/22/2019     Cannot estimate LDL when triglyceride exceeds 400 mg/dL    2018     Cannot estimate LDL when triglyceride exceeds 400 mg/dL     LDL Cholesterol Direct (mg/dL)   Date Value   2019 105 (H)   2018 97     H/o Aortic Dissection:    He sees cardiology.    He is worried about leaking from the root and will be looking at the video this week.      MRA chest dated 2019 10:38 AM     CLINICAL INFORMATION: Thoracic aorta disease, post repair (TEVAR),  follow up; History of aortic dissection; Ascending aortic aneurysm (H)     TECHNIQUE:  Multiphase acquisitions of the chest with contrast, with  3-D reformations reviewed by a radiologist at a workstation.     COMPARISON: 3/23/2016     FINDINGS:       Aortic measurements: There is an endograft extending from the proximal  ascending aorta to the mid ascending aorta. Redemonstration of aortic  dissection extending from the proximal aortic arch into the abdominal  aorta beyond the field-of-view. The dissection extends into the  innominate, both common carotids, and left subclavian artery. Bovine  aortic arch. Redemonstration of extensive thrombosis of the false  lumen from the proximal descending aorta into the proximal abdominal  aorta. The celiac is supplied by the true lumen.     Sinuses of Valsalva: 5.5 cm. Previously measurin.0 cm.  Proximal graft: 3.2 cm. Previously measuring: 3.1 cm.  Distal graft graft: 3.4 cm. Previously measurin.8 cm.  Aortic arch: 4.8 cm. Previously measurin.8 cm.  Proximal descending thoracic aorta: 4.9 cm. Previously measurin.0   cm.  MID descending aorta: 4.3 cm, previously 4.2 cm.  Descending aorta at the level of diaphragm: 4.3 cm, previously 4.4 cm     The pulmonary arteries: No proximal pulmonary embolism. Normal size.     Chest:   No abnormal hilar, mediastinal or axillary lymphadenopathy is seen.   No focal parenchymal abnormalities are identified. Normal sized heart.        Abdomen:  Unchanged hepatic cysts. Unremarkable upper abdomen.                                                                       Impression:     1. Stable positioning of the proximal ascending aortic endograft stent  with increased size of the dilated aortic root, now measuring 5.5 cm,  previously 5.0 cm.     2. Stable appearance of the aortic dissection extending from the  proximal aortic arch into the abdominal aorta out of the  field-of-view.    Obesity:     Wt Readings from Last 4 Encounters:   19 92.1 kg (203 lb)   18 93.2 kg (205 lb 6.4 oz)   01/15/18 94.8 kg (209 lb)   17 100.2 kg (221 lb)       Amount of exercise or physical activity: 6-7 days/week for an average of 30-45 minutes    Problems taking medications regularly: No    Medication side effects: none    Diet: regular (no restrictions)    Patient Active Problem List   Diagnosis     Aortic dissection (H)     Splenic infarct     Hypertension goal BP (blood pressure) < 130/80     Health Care Home     Ascending aortic aneurysm (H)     SBO (small bowel obstruction) (H)     Ischemic colitis (H)     Bicuspid aortic valve     bmi 30     Vitamin B12 deficiency     Vitamin D deficiency     Chronic pain     Hyperlipidemia LDL goal <100     H/O aortic dissection     CKD (chronic kidney disease) stage 2, GFR 60-89 ml/min     Diabetes mellitus, type 2 (H)     Aortic valve disorder     Calculus of gallbladder     Carotid dissection, bilateral (H)     Type 2 diabetes mellitus with complication, without long-term current use of insulin (H)     Past Surgical History:   Procedure Laterality Date     CHOLECYSTECTOMY, OPEN       COLONOSCOPY      superficial ulcerws rt colon     ENDOVAS REPAIR, INFRARENL ABDOM AORTIC ANEURYSM/DISSECT; WFEUI-ROG-MQRBP/AORTO-UNIFEMORAL PROSTHESIS  2/10    aorta dissection     SMALL BOWEL RESECTION      Dr. [place]       Social History     Tobacco Use     Smoking status: Former Smoker     Last attempt to quit: 2001     Years since quittin.6     Smokeless tobacco: Never Used   Substance Use  Topics     Alcohol use: No     Alcohol/week: 0.0 oz     Family History   Problem Relation Age of Onset     Unknown/Adopted Mother      Unknown/Adopted Father      Unknown/Adopted Maternal Grandmother      Unknown/Adopted Maternal Grandfather      Unknown/Adopted Paternal Grandmother      Unknown/Adopted Paternal Grandfather          PROBLEMS TO ADD ON...  Reviewed and updated as needed this visit by Provider    Review of Systems   Constitutional, HEENT, cardiovascular, pulmonary, gi and gu systems are negative, except as otherwise noted.      Objective    /58   Pulse 62   Temp 97.4  F (36.3  C) (Oral)   Wt 92.1 kg (203 lb)   SpO2 97%   BMI 31.49 kg/m    Body mass index is 31.49 kg/m .  Physical Exam   GENERAL: healthy, alert and no distress  NECK: no adenopathy and thyroid normal to palpation  RESP: lungs clear to auscultation - no rales, rhonchi or wheezes  CV: regular rate and rhythm, normal S1 S2, no S3 or S4, no murmur, click or rub, no peripheral edema   ABDOMEN: soft, obese, nontender, no masses and bowel sounds normal  MS: no gross musculoskeletal defects noted, no edema  NEURO: Normal strength and tone, mentation intact and speech normal  PSYCH: mentation appears normal, affect normal/bright    Diagnostic Test Results:  Labs reviewed in Epic      Results for orders placed or performed in visit on 07/22/19   Lipid panel reflex to direct LDL Fasting   Result Value Ref Range    Cholesterol 219 (H) <200 mg/dL    Triglycerides 675 (H) <150 mg/dL    HDL Cholesterol 41 >39 mg/dL    LDL Cholesterol Calculated  <100 mg/dL     Cannot estimate LDL when triglyceride exceeds 400 mg/dL    Non HDL Cholesterol 178 (H) <130 mg/dL   HEMOGLOBIN A1C   Result Value Ref Range    Hemoglobin A1C 11.1 (H) 0 - 5.6 %   Basic metabolic panel  (Ca, Cl, CO2, Creat, Gluc, K, Na, BUN)   Result Value Ref Range    Sodium 136 133 - 144 mmol/L    Potassium 3.5 3.4 - 5.3 mmol/L    Chloride 102 94 - 109 mmol/L    Carbon Dioxide 25 20  - 32 mmol/L    Anion Gap 9 3 - 14 mmol/L    Glucose 287 (H) 70 - 99 mg/dL    Urea Nitrogen 16 7 - 30 mg/dL    Creatinine 0.90 0.66 - 1.25 mg/dL    GFR Estimate >90 >60 mL/min/[1.73_m2]    GFR Estimate If Black >90 >60 mL/min/[1.73_m2]    Calcium 8.8 8.5 - 10.1 mg/dL   LDL cholesterol direct   Result Value Ref Range    LDL Cholesterol Direct 105 (H) <100 mg/dL       Assessment & Plan     Luther was seen today for imm/inj, diabetes and bunion.    Diagnoses and all orders for this visit:    Type 2 diabetes mellitus with hyperglycemia, without long-term current use of insulin (H)   A1c 11.1. Reviewed the nature of diabetes and its complications.  Went over co morbidities, need for good blood sugar control as well as BP and Cholesterol control.  Discussed the recheck schedule    -     HEMOGLOBIN A1C  -     DIABETES EDUCATOR REFERRAL  -     blood glucose monitoring (NO BRAND SPECIFIED) meter device kit; Use to test blood sugar 3 times daily or as directed. Preferred blood glucose meter OR supplies to accompany: Blood Glucose Monitor Brands: per insurance.  -     blood glucose (NO BRAND SPECIFIED) test strip; Use to test blood sugar 3 times daily or as directed. To accompany: Blood Glucose Monitor Brands: per insurance.  -     blood glucose calibration (NO BRAND SPECIFIED) solution; To accompany: Blood Glucose Monitor Brands: per insurance.  -     thin (NO BRAND SPECIFIED) lancets; Use with lanceting device. To accompany: Blood Glucose Monitor Brands: per insurance.  -     alcohol swab prep pads; Use to swab area of injection/kaylie as directed.  -     metFORMIN (GLUCOPHAGE-XR) 500 MG 24 hr tablet; Take 1 tablet (500 mg) by mouth daily (with dinner)  -     OPHTHALMOLOGY ADULT REFERRAL  -     LDL cholesterol direct  -     LDL cholesterol direct    Hyperlipidemia LDL goal <100  -     Lipid panel reflex to direct LDL Fasting  -     rosuvastatin (CRESTOR) 20 MG tablet; Take 1 tablet (20 mg) by mouth daily  -     rosuvastatin  (CRESTOR) 20 MG tablet; Take 1 tablet (20 mg) by mouth daily    Hypertriglyceridemia  -     rosuvastatin (CRESTOR) 20 MG tablet; Take 1 tablet (20 mg) by mouth daily    HTN, goal below 140/90  -     Basic metabolic panel  (Ca, Cl, CO2, Creat, Gluc, K, Na, BUN)  -     carvedilol (COREG) 25 MG tablet; TAKE 1 TABLET BY MOUTH 2 TIMES DAILY WITH MEALS.  -     lisinopril-hydrochlorothiazide (PRINZIDE/ZESTORETIC) 20-25 MG tablet; Take 1 tablet by mouth daily    Dissection of aorta, unspecified portion of aorta (H)       -   Recent CT from 2/2019 stable    H/O aortic dissection       -    Stabe    BMI 31.0-31.9,adult        -    Counseled to make better food choices, exercise as tolerated, and lose weight.     Callus of foot: Left        -    Well fitting shoes    Colon cancer screening  -     Fecal colorectal cancer screen (FIT); Future    Other orders  -     TDAP, IM (10 - 64 YRS) - Adacel     Return in about 1 month (around 8/22/2019) for Follow up for symptoms recheck.    Watson Bhagat MD  [facility]

## 2019-07-22 ENCOUNTER — OFFICE VISIT (OUTPATIENT)
Dept: FAMILY MEDICINE | Facility: CLINIC | Age: 54
End: 2019-07-22
Payer: COMMERCIAL

## 2019-07-22 VITALS
SYSTOLIC BLOOD PRESSURE: 118 MMHG | TEMPERATURE: 97.4 F | OXYGEN SATURATION: 97 % | BODY MASS INDEX: 31.49 KG/M2 | DIASTOLIC BLOOD PRESSURE: 58 MMHG | HEART RATE: 62 BPM | WEIGHT: 203 LBS

## 2019-07-22 DIAGNOSIS — E78.1 HYPERTRIGLYCERIDEMIA: ICD-10-CM

## 2019-07-22 DIAGNOSIS — Z86.79 H/O AORTIC DISSECTION: ICD-10-CM

## 2019-07-22 DIAGNOSIS — E11.65 TYPE 2 DIABETES MELLITUS WITH HYPERGLYCEMIA, WITHOUT LONG-TERM CURRENT USE OF INSULIN (H): Primary | ICD-10-CM

## 2019-07-22 DIAGNOSIS — E78.5 HYPERLIPIDEMIA LDL GOAL <100: ICD-10-CM

## 2019-07-22 DIAGNOSIS — I71.00 DISSECTION OF AORTA, UNSPECIFIED PORTION OF AORTA (H): ICD-10-CM

## 2019-07-22 DIAGNOSIS — Z12.11 COLON CANCER SCREENING: ICD-10-CM

## 2019-07-22 DIAGNOSIS — I10 HTN, GOAL BELOW 140/90: ICD-10-CM

## 2019-07-22 DIAGNOSIS — L84 CALLUS OF FOOT: ICD-10-CM

## 2019-07-22 LAB
ANION GAP SERPL CALCULATED.3IONS-SCNC: 9 MMOL/L (ref 3–14)
BUN SERPL-MCNC: 16 MG/DL (ref 7–30)
CALCIUM SERPL-MCNC: 8.8 MG/DL (ref 8.5–10.1)
CHLORIDE SERPL-SCNC: 102 MMOL/L (ref 94–109)
CHOLEST SERPL-MCNC: 219 MG/DL
CO2 SERPL-SCNC: 25 MMOL/L (ref 20–32)
CREAT SERPL-MCNC: 0.9 MG/DL (ref 0.66–1.25)
GFR SERPL CREATININE-BSD FRML MDRD: >90 ML/MIN/{1.73_M2}
GLUCOSE SERPL-MCNC: 287 MG/DL (ref 70–99)
HBA1C MFR BLD: 11.1 % (ref 0–5.6)
HDLC SERPL-MCNC: 41 MG/DL
LDLC SERPL CALC-MCNC: ABNORMAL MG/DL
LDLC SERPL DIRECT ASSAY-MCNC: 105 MG/DL
NONHDLC SERPL-MCNC: 178 MG/DL
POTASSIUM SERPL-SCNC: 3.5 MMOL/L (ref 3.4–5.3)
SODIUM SERPL-SCNC: 136 MMOL/L (ref 133–144)
TRIGL SERPL-MCNC: 675 MG/DL

## 2019-07-22 PROCEDURE — 80048 BASIC METABOLIC PNL TOTAL CA: CPT | Performed by: FAMILY MEDICINE

## 2019-07-22 PROCEDURE — 83036 HEMOGLOBIN GLYCOSYLATED A1C: CPT | Performed by: FAMILY MEDICINE

## 2019-07-22 PROCEDURE — 80061 LIPID PANEL: CPT | Performed by: FAMILY MEDICINE

## 2019-07-22 PROCEDURE — 90715 TDAP VACCINE 7 YRS/> IM: CPT | Performed by: FAMILY MEDICINE

## 2019-07-22 PROCEDURE — 83721 ASSAY OF BLOOD LIPOPROTEIN: CPT | Mod: 59 | Performed by: FAMILY MEDICINE

## 2019-07-22 PROCEDURE — 90471 IMMUNIZATION ADMIN: CPT | Performed by: FAMILY MEDICINE

## 2019-07-22 PROCEDURE — 36415 COLL VENOUS BLD VENIPUNCTURE: CPT | Performed by: FAMILY MEDICINE

## 2019-07-22 PROCEDURE — 99214 OFFICE O/P EST MOD 30 MIN: CPT | Mod: 25 | Performed by: FAMILY MEDICINE

## 2019-07-22 RX ORDER — ROSUVASTATIN CALCIUM 20 MG/1
20 TABLET, COATED ORAL DAILY
Qty: 90 TABLET | Refills: 1 | Status: SHIPPED | OUTPATIENT
Start: 2019-07-22 | End: 2020-01-27

## 2019-07-22 RX ORDER — GLUCOSAMINE HCL/CHONDROITIN SU 500-400 MG
CAPSULE ORAL
Qty: 100 EACH | Refills: 3 | Status: SHIPPED | OUTPATIENT
Start: 2019-07-22

## 2019-07-22 RX ORDER — METFORMIN HCL 500 MG
500 TABLET, EXTENDED RELEASE 24 HR ORAL
Qty: 180 TABLET | Refills: 1 | Status: SHIPPED | OUTPATIENT
Start: 2019-07-22 | End: 2020-07-28

## 2019-07-22 RX ORDER — ROSUVASTATIN CALCIUM 20 MG/1
20 TABLET, COATED ORAL DAILY
Qty: 90 TABLET | Refills: 1 | Status: SHIPPED | OUTPATIENT
Start: 2019-07-22 | End: 2020-08-19

## 2019-07-22 RX ORDER — LANCETS
EACH MISCELLANEOUS
Qty: 100 EACH | Refills: 6 | Status: SHIPPED | OUTPATIENT
Start: 2019-07-22 | End: 2022-02-04

## 2019-07-22 RX ORDER — LISINOPRIL AND HYDROCHLOROTHIAZIDE 20; 25 MG/1; MG/1
1 TABLET ORAL DAILY
Qty: 90 TABLET | Refills: 3 | Status: SHIPPED | OUTPATIENT
Start: 2019-07-22 | End: 2020-08-17

## 2019-07-22 RX ORDER — CARVEDILOL 25 MG/1
TABLET ORAL
Qty: 180 TABLET | Refills: 3 | Status: SHIPPED | OUTPATIENT
Start: 2019-07-22 | End: 2020-08-18

## 2019-07-22 ASSESSMENT — ANXIETY QUESTIONNAIRES
6. BECOMING EASILY ANNOYED OR IRRITABLE: NOT AT ALL
3. WORRYING TOO MUCH ABOUT DIFFERENT THINGS: NOT AT ALL
GAD7 TOTAL SCORE: 0
1. FEELING NERVOUS, ANXIOUS, OR ON EDGE: NOT AT ALL
5. BEING SO RESTLESS THAT IT IS HARD TO SIT STILL: NOT AT ALL
IF YOU CHECKED OFF ANY PROBLEMS ON THIS QUESTIONNAIRE, HOW DIFFICULT HAVE THESE PROBLEMS MADE IT FOR YOU TO DO YOUR WORK, TAKE CARE OF THINGS AT HOME, OR GET ALONG WITH OTHER PEOPLE: NOT DIFFICULT AT ALL
7. FEELING AFRAID AS IF SOMETHING AWFUL MIGHT HAPPEN: NOT AT ALL
2. NOT BEING ABLE TO STOP OR CONTROL WORRYING: NOT AT ALL

## 2019-07-22 ASSESSMENT — PATIENT HEALTH QUESTIONNAIRE - PHQ9
5. POOR APPETITE OR OVEREATING: NOT AT ALL
SUM OF ALL RESPONSES TO PHQ QUESTIONS 1-9: 1

## 2019-07-23 ENCOUNTER — TELEPHONE (OUTPATIENT)
Dept: FAMILY MEDICINE | Facility: CLINIC | Age: 54
End: 2019-07-23

## 2019-07-23 ASSESSMENT — ANXIETY QUESTIONNAIRES: GAD7 TOTAL SCORE: 0

## 2019-07-23 NOTE — TELEPHONE ENCOUNTER
Diabetes Education Scheduling Outreach #1:    Call to patient to schedule. Left message with phone number to call to schedule.    Plan for 2nd outreach attempt within 2 business days.    [facility]  Diabetes and Nutrition Scheduling

## 2019-07-25 ENCOUNTER — MYC MEDICAL ADVICE (OUTPATIENT)
Dept: EDUCATION SERVICES | Facility: CLINIC | Age: 54
End: 2019-07-25

## 2019-07-25 NOTE — TELEPHONE ENCOUNTER
Attempted 3rd call to schedule patient with newly diagnosed T2D for diabetes education.   No answer, did not leave message as one has already been left for him today.     Sent [employer] message for final outreach and will alert referring provider that we have not heard back yet from patient to schedule.   Did not mention diabetes in message, just doctor's referral to schedule.     Kianna Avitia RD, LD, CDE

## 2019-07-25 NOTE — TELEPHONE ENCOUNTER
Diabetes Education Scheduling Outreach #2:    Call to patient to schedule. Left message with phone number to call to schedule.    Deirdre Christianson  [place] [facility]  Diabetes and Nutrition Scheduling

## 2019-08-15 NOTE — TELEPHONE ENCOUNTER
Sent [employer] message 7/25 requesting he call to schedule diabetes ed appointment.   Received automated reply today, 8/15, that he has not read the message.   Have already alerted PCP that patient has not been in touch to schedule.  Kianna Avitia RD, LD, CDE

## 2019-09-24 NOTE — TELEPHONE ENCOUNTER
Copied from [place]:   I received the letter from your office about the concern with my Triglycerides and non HDL cholesterol.  I have adjusted my diet to the recommendations in the letter and researched foods high in fiber.  I would like to take a couple months to see how that effects those levels.  Please do not cut off my blood pressure medicine in the mean time.  I need to refill the Lisinopril next week.  Please do so.  Thank you for your concerns.  I appreciate it.    Lisinopril-HCTZ      Last Written Prescription Date: 10/19/17  Last Fill Quantity: 30, # refills: 0  Last Office Visit with [facility], P or [facility] prescribing provider: 4/3/17       Potassium   Date Value Ref Range Status   10/26/2017 4.0 3.4 - 5.3 mmol/L Final     Creatinine   Date Value Ref Range Status   10/26/2017 1.03 0.66 - 1.25 mg/dL Final     BP Readings from Last 3 Encounters:   04/03/17 124/68   01/16/17 130/82   03/21/16 132/82     Bruna Fallon RN     Admission Reconciliation is Completed  Discharge Reconciliation is Not Complete Admission Reconciliation is Completed  Discharge Reconciliation is Completed

## 2019-10-25 ENCOUNTER — TELEPHONE (OUTPATIENT)
Dept: FAMILY MEDICINE | Facility: CLINIC | Age: 54
End: 2019-10-25

## 2019-10-25 NOTE — LETTER
October 25, 2019          Luther Mariee,  [address] [facility] [facility] [address]        Dear Luther Mariee      Monitoring and managing your preventative and chronic health conditions are very important to us. Our records indicate that you have not scheduled for Diabetic Check  which was recommended by Dr. Bhagat.       If you have received your health care elsewhere, please call the clinic so the information can be documented in your chart.    Please call [phone] or message us through your [employer] account to schedule an appointment or provide information for your chart.     Feel free to contact us if you have any questions or concerns!    I look forward to seeing you and working with you on your health care needs.     Sincerely,       Your [facility] Team/sg

## 2019-10-25 NOTE — TELEPHONE ENCOUNTER
Panel Management Review      Patient has the following on his problem list:     Diabetes    ASA: Passed    Last A1C  Lab Results   Component Value Date    A1C 11.1 07/22/2019    A1C 11.2 12/28/2018    A1C 6.5 08/31/2015     A1C tested: FAILED    Last LDL:    Lab Results   Component Value Date    CHOL 219 07/22/2019     Lab Results   Component Value Date    HDL 41 07/22/2019     Lab Results   Component Value Date    LDL  07/22/2019     Cannot estimate LDL when triglyceride exceeds 400 mg/dL     07/22/2019     Lab Results   Component Value Date    TRIG 675 07/22/2019     Lab Results   Component Value Date    CHOLHDLRATIO 5.5 09/06/2012     Lab Results   Component Value Date    NHDL 178 07/22/2019       Is the patient on a Statin? YES             Is the patient on Aspirin? YES    Medications     HMG CoA Reductase Inhibitors     rosuvastatin (CRESTOR) 20 MG tablet        rosuvastatin (CRESTOR) 20 MG tablet       Salicylates     aspirin 81 MG tablet             Last three blood pressure readings:  BP Readings from Last 3 Encounters:   07/22/19 118/58   12/28/18 124/76   01/15/18 130/74       Date of last diabetes office visit: 7/22/19, follow up in one month recommended.     Tobacco History:     History   Smoking Status     Former Smoker     Quit date: 12/6/2001   Smokeless Tobacco     Never Used         Hypertension   Last three blood pressure readings:  BP Readings from Last 3 Encounters:   07/22/19 118/58   12/28/18 124/76   01/15/18 130/74     Blood pressure: Passed    HTN Guidelines:  Less than 140/90      Composite cancer screening  Chart review shows that this patient is due/due soon for the following None  Summary:    Patient is due/failing the following:   A1C    Action needed:   Patient needs office visit for diabetic check.    Type of outreach:    Sent letter.    Questions for provider review:    None                                                                                                                                     Mariel Bentley MA       Chart routed to none .

## 2019-11-09 ENCOUNTER — HEALTH MAINTENANCE LETTER (OUTPATIENT)
Age: 54
End: 2019-11-09

## 2020-01-24 DIAGNOSIS — E78.5 HYPERLIPIDEMIA LDL GOAL <100: ICD-10-CM

## 2020-01-27 RX ORDER — ROSUVASTATIN CALCIUM 20 MG/1
TABLET, COATED ORAL
Qty: 90 TABLET | Refills: 1 | Status: SHIPPED | OUTPATIENT
Start: 2020-01-27 | End: 2020-07-29

## 2020-01-27 NOTE — TELEPHONE ENCOUNTER
"Prescription approved per [facility] Refill Protocol.    Requested Prescriptions   Pending Prescriptions Disp Refills     rosuvastatin (CRESTOR) 20 MG tablet [Pharmacy Med Name: ROSUVASTATIN CALCIUM 20 MG TAB] 90 tablet 1     Sig: TAKE 1 TABLET BY MOUTH EVERY DAY       Statins Protocol Passed - 1/24/2020  7:16 AM        Passed - LDL on file in past 12 months     Recent Labs   Lab Test 07/22/19  0743   LDL Cannot estimate LDL when triglyceride exceeds 400 mg/dL  105*             Passed - No abnormal creatine kinase in past 12 months     No lab results found.             Passed - Recent (12 mo) or future (30 days) visit within the authorizing provider's specialty     Patient has had an office visit with the authorizing provider or a provider within the authorizing providers department within the previous 12 mos or has a future within next 30 days. See \"Patient Info\" tab in inbasket, or \"Choose Columns\" in Meds & Orders section of the refill encounter.              Passed - Medication is active on med list        Passed - Patient is age 18 or older          "

## 2020-02-23 ENCOUNTER — HEALTH MAINTENANCE LETTER (OUTPATIENT)
Age: 55
End: 2020-02-23

## 2020-02-26 ENCOUNTER — TELEPHONE (OUTPATIENT)
Dept: FAMILY MEDICINE | Facility: CLINIC | Age: 55
End: 2020-02-26

## 2020-02-26 NOTE — TELEPHONE ENCOUNTER
Panel Management Review      Patient has the following on his problem list:     Diabetes    ASA: Passed    Last A1C  Lab Results   Component Value Date    A1C 11.1 07/22/2019    A1C 11.2 12/28/2018    A1C 6.5 08/31/2015     A1C tested: FAILED    Last LDL:    Lab Results   Component Value Date    CHOL 219 07/22/2019     Lab Results   Component Value Date    HDL 41 07/22/2019     Lab Results   Component Value Date    LDL  07/22/2019     Cannot estimate LDL when triglyceride exceeds 400 mg/dL     07/22/2019     Lab Results   Component Value Date    TRIG 675 07/22/2019     Lab Results   Component Value Date    CHOLHDLRATIO 5.5 09/06/2012     Lab Results   Component Value Date    NHDL 178 07/22/2019       Is the patient on a Statin? YES             Is the patient on Aspirin? YES    Medications     HMG CoA Reductase Inhibitors     rosuvastatin (CRESTOR) 20 MG tablet        rosuvastatin (CRESTOR) 20 MG tablet       Salicylates     aspirin 81 MG tablet             Last three blood pressure readings:  BP Readings from Last 3 Encounters:   07/22/19 118/58   12/28/18 124/76   01/15/18 130/74       Date of last diabetes office visit: 07/22/2019     Tobacco History:     History   Smoking Status     Former Smoker     Quit date: 12/6/2001   Smokeless Tobacco     Never Used           Composite cancer screening  Chart review shows that this patient is due/due soon for the following None  Summary:    Patient is due/failing the following:   Lipid, microalbumin, foot exam,A1C and PHYSICAL    Action needed:   Patient needs office visit for Physical and Diabetic Check.    Type of outreach:    Sent letter.    Questions for provider review:    None                                                                                                                                    T[place] Castillo [facility]       Chart routed to none .

## 2020-02-26 NOTE — LETTER
February 26, 2020          Luther Mariee,  [address] [facility] [address]        Dear Luther Mariee      Monitoring and managing your preventative and chronic health conditions are very important to us. Our records indicate that you have not scheduled for Diabetic Check and Annual physical exam  which was recommended by Dr. Watson Bhagat.      If you have received your health care elsewhere, please call the clinic so the information can be documented in your chart.    Please call [phone] or message us through your [employer] account to schedule an appointment or provide information for your chart.     Feel free to contact us if you have any questions or concerns!    I look forward to seeing you and working with you on your health care needs.     Sincerely,       Your [place] [facility] Team/AV

## 2020-07-28 DIAGNOSIS — E11.65 TYPE 2 DIABETES MELLITUS WITH HYPERGLYCEMIA, WITHOUT LONG-TERM CURRENT USE OF INSULIN (H): ICD-10-CM

## 2020-07-28 RX ORDER — METFORMIN HCL 500 MG
500 TABLET, EXTENDED RELEASE 24 HR ORAL
Qty: 90 TABLET | Refills: 0 | Status: SHIPPED | OUTPATIENT
Start: 2020-07-28 | End: 2020-12-30

## 2020-07-29 DIAGNOSIS — E78.5 HYPERLIPIDEMIA LDL GOAL <100: ICD-10-CM

## 2020-07-29 RX ORDER — ROSUVASTATIN CALCIUM 20 MG/1
TABLET, COATED ORAL
Qty: 90 TABLET | Refills: 0 | Status: SHIPPED | OUTPATIENT
Start: 2020-07-29 | End: 2020-10-26

## 2020-07-29 NOTE — TELEPHONE ENCOUNTER
Due for appointment and labs for further refills. Please notify patient.     Medication is being filled for 1 time refill only due to:  Patient needs to be seen because appointment and labs needed.

## 2020-07-29 NOTE — LETTER
July 29, 2020          Luther Mariee,  [address] [facility] [address]        Dear Luther Mariee          Your provider has sent a  jade refill of rosuvastatin (CRESTOR) 20 MG tablet . You are due for an appointment for further refills. We are currently only able to see select in person visits. We ask that you schedule a telephone visit, video visit or evisit (through [employer]) with your provider.  Please contact the clinic to schedule an appointment for further refills.           Sincerely,       Your [place] Care Team/AV

## 2020-08-15 DIAGNOSIS — I10 HTN, GOAL BELOW 140/90: ICD-10-CM

## 2020-08-16 DIAGNOSIS — I10 HTN, GOAL BELOW 140/90: ICD-10-CM

## 2020-08-17 RX ORDER — LISINOPRIL AND HYDROCHLOROTHIAZIDE 20; 25 MG/1; MG/1
TABLET ORAL
Qty: 90 TABLET | Refills: 1 | Status: SHIPPED | OUTPATIENT
Start: 2020-08-17 | End: 2020-08-19

## 2020-08-17 NOTE — TELEPHONE ENCOUNTER
"Routing refill request to provider for review/approval because:  Failed protocol - see below    Requested Prescriptions   Pending Prescriptions Disp Refills    lisinopril-hydrochlorothiazide (ZESTORETIC) 20-25 MG tablet [Pharmacy Med Name: LISINOPRIL-HCTZ 20-25 MG TAB] 90 tablet 3     Sig: TAKE 1 TABLET BY MOUTH EVERY DAY       Diuretics (Including Combos) Protocol Failed - 8/17/2020 10:08 AM        Failed - Blood pressure under 140/90 in past 12 months     BP Readings from Last 3 Encounters:   07/22/19 118/58   12/28/18 124/76   01/15/18 130/74                 Failed - Recent (12 mo) or future (30 days) visit within the authorizing provider's specialty     Patient has had an office visit with the authorizing provider or a provider within the authorizing providers department within the previous 12 mos or has a future within next 30 days. See \"Patient Info\" tab in inbasket, or \"Choose Columns\" in Meds & Orders section of the refill encounter.              Failed - Normal serum creatinine on file in past 12 months     Recent Labs   Lab Test 07/22/19  0743   CR 0.90              Failed - Normal serum potassium on file in past 12 months     Recent Labs   Lab Test 07/22/19  0743   POTASSIUM 3.5                    Failed - Normal serum sodium on file in past 12 months     Recent Labs   Lab Test 07/22/19  0743                 Passed - Medication is active on med list        Passed - Patient is age 18 or older       ACE Inhibitors (Including Combos) Protocol Failed - 8/17/2020 10:08 AM        Failed - Blood pressure under 140/90 in past 12 months     BP Readings from Last 3 Encounters:   07/22/19 118/58   12/28/18 124/76   01/15/18 130/74                 Failed - Recent (12 mo) or future (30 days) visit within the authorizing provider's specialty     Patient has had an office visit with the authorizing provider or a provider within the authorizing providers department within the previous 12 mos or has a future within " "next 30 days. See \"Patient Info\" tab in inbasket, or \"Choose Columns\" in Meds & Orders section of the refill encounter.              Failed - Normal serum creatinine on file in past 12 months     Recent Labs   Lab Test 07/22/19  0743   CR 0.90       Ok to refill medication if creatinine is low          Failed - Normal serum potassium on file in past 12 months     Recent Labs   Lab Test 07/22/19  0743   POTASSIUM 3.5             Passed - Medication is active on med list        Passed - Patient is age 18 or older           Zaina Hawk RN  "

## 2020-08-17 NOTE — TELEPHONE ENCOUNTER
Called patient and left VM to call clinic. Please help schedule DM, BP, Lipids appointment if patient returns call.  Arline ASHLEY CMA ([facility])

## 2020-08-18 RX ORDER — CARVEDILOL 25 MG/1
TABLET ORAL
Qty: 180 TABLET | Refills: 0 | Status: SHIPPED | OUTPATIENT
Start: 2020-08-18 | End: 2020-08-19

## 2020-08-18 NOTE — PROGRESS NOTES
"Luther Mariee is a 54 year old male who is being evaluated via a billable video visit.      The patient has been notified of following:     \"This video visit will be conducted via a call between you and your physician/provider. We have found that certain health care needs can be provided without the need for an in-person physical exam.  This service lets us provide the care you need with a video conversation.  If a prescription is necessary we can send it directly to your pharmacy.  If lab work is needed we can place an order for that and you can then stop by our lab to have the test done at a later time.    Video visits are billed at different rates depending on your insurance coverage.  Please reach out to your insurance provider with any questions.    If during the course of the call the physician/provider feels a video visit is not appropriate, you will not be charged for this service.\"    Patient has given verbal consent for Video visit? Yes  How would you like to obtain your AVS? MyChart  If you are dropped from the video visit, the video invite should be resent to: Send to e-mail at: [email or web address]  Will anyone else be joining your video visit? No      Subjective     Luther Mariee is a 54 year old male who presents today via video visit for the following health issues:    HPI    Hypertension Follow-up   BP been good, in the 120's/80's  Has also lost some weight with diet. Also been trying to stay active.      Do you check your blood pressure regularly outside of the clinic? Yes     Are you following a low salt diet? Yes    Are your blood pressures ever more than 140 on the top number (systolic) OR more   than 90 on the bottom number (diastolic), for example 140/90? No     Diabetes Follow-up      How often are you checking your blood sugar? Not at all    What concerns do you have today about your diabetes? None     Do you have any of these symptoms? (Select all that apply)  No numbness or " tingling in feet.  No redness, sores or blisters on feet.  No complaints of excessive thirst.  No reports of blurry vision.  No significant changes to weight.    Have you had a diabetic eye exam in the last 12 months? No        BP Readings from Last 2 Encounters:   07/22/19 118/58   12/28/18 124/76     Hemoglobin A1C (%)   Date Value   07/22/2019 11.1 (H)   12/28/2018 11.2 (H)     LDL Cholesterol Calculated (mg/dL)   Date Value   07/22/2019     Cannot estimate LDL when triglyceride exceeds 400 mg/dL   12/28/2018     Cannot estimate LDL when triglyceride exceeds 400 mg/dL     LDL Cholesterol Direct (mg/dL)   Date Value   07/22/2019 105 (H)   12/28/2018 97       How many servings of fruits and vegetables do you eat daily?  4 or more    On average, how many sweetened beverages do you drink each day (Examples: soda, juice, sweet tea, etc.  Do NOT count diet or artificially sweetened beverages)?   0    How many days per week do you exercise enough to make your heart beat faster? 7    How many minutes a day do you exercise enough to make your heart beat faster? 60 or more    How many days per week do you miss taking your medication? 0    Physical Exam     GENERAL: Healthy, alert and no distress  RESP: Normal breathing.    PSYCH: Mentation appears normal, affect normal/bright, judgement and insight intact, normal speech and appearance well-groomed.      Diagnostic Test Results:  Labs reviewed in Epic        Assessment & Plan     Diagnoses and all orders for this visit:    HTN, goal below 140/90  -     lisinopril-hydrochlorothiazide (ZESTORETIC) 20-25 MG tablet; Take 1 tablet by mouth daily  -     carvedilol (COREG) 25 MG tablet; TAKE 1 TABLET BY MOUTH TWICE A DAY WITH MEALS    Type 2 diabetes mellitus with hyperglycemia, without long-term current use of insulin (H)     Last A1c had been very high recommended increasing Metformin and adding Amaryl  -     metFORMIN (GLUCOPHAGE-XR) 500 MG 24 hr tablet; Take 1 tablet (500 mg)  by mouth 2 times daily (with meals)  -     glimepiride (AMARYL) 4 MG tablet; Take 1 tablet (4 mg) by mouth every morning (before breakfast)    Dissection of aorta, unspecified portion of aorta (H)      Echocardiogram from 5/24/2019;  Echocardiogram showed ascending aorta as severely dilated at 5.0 cm    Prior MRA study from 2/2019 showed: Stable positioning of the proximal ascending aortic endograft stent   with increased size of the dilated aortic root measuring 5.5 cm, previously 5.0 cm.    cardiologist follow up was recommended to review these findings. Encouraged to follow up    Return in about 2 months (around 10/19/2020) for Routine Visit.    Watson Bhagat MD  [facility]      Video-Visit Details    Type of service:  Video Visit    Video End Time:1:00 PM    Originating Location (pt. Location): Home    Distant Location (provider location):  [facility]     Platform used for Video Visit: Gavino

## 2020-08-19 ENCOUNTER — VIRTUAL VISIT (OUTPATIENT)
Dept: FAMILY MEDICINE | Facility: CLINIC | Age: 55
End: 2020-08-19
Payer: COMMERCIAL

## 2020-08-19 ENCOUNTER — TELEPHONE (OUTPATIENT)
Dept: FAMILY MEDICINE | Facility: CLINIC | Age: 55
End: 2020-08-19

## 2020-08-19 DIAGNOSIS — I71.00 DISSECTION OF AORTA, UNSPECIFIED PORTION OF AORTA (H): ICD-10-CM

## 2020-08-19 DIAGNOSIS — E11.65 TYPE 2 DIABETES MELLITUS WITH HYPERGLYCEMIA, WITHOUT LONG-TERM CURRENT USE OF INSULIN (H): ICD-10-CM

## 2020-08-19 DIAGNOSIS — I10 HTN, GOAL BELOW 140/90: Primary | ICD-10-CM

## 2020-08-19 PROCEDURE — 99214 OFFICE O/P EST MOD 30 MIN: CPT | Mod: GT | Performed by: FAMILY MEDICINE

## 2020-08-19 RX ORDER — CARVEDILOL 25 MG/1
TABLET ORAL
Qty: 180 TABLET | Refills: 0 | Status: SHIPPED | OUTPATIENT
Start: 2020-08-19 | End: 2020-12-30

## 2020-08-19 RX ORDER — METFORMIN HCL 500 MG
500 TABLET, EXTENDED RELEASE 24 HR ORAL 2 TIMES DAILY WITH MEALS
Qty: 180 TABLET | Refills: 0 | Status: SHIPPED | OUTPATIENT
Start: 2020-08-19 | End: 2020-12-30

## 2020-08-19 RX ORDER — GLIMEPIRIDE 4 MG/1
4 TABLET ORAL
Qty: 90 TABLET | Refills: 0 | Status: SHIPPED | OUTPATIENT
Start: 2020-08-19 | End: 2020-11-10

## 2020-08-19 RX ORDER — LISINOPRIL AND HYDROCHLOROTHIAZIDE 20; 25 MG/1; MG/1
1 TABLET ORAL DAILY
Qty: 90 TABLET | Refills: 1 | Status: SHIPPED | OUTPATIENT
Start: 2020-08-19 | End: 2021-05-21

## 2020-10-24 DIAGNOSIS — E78.5 HYPERLIPIDEMIA LDL GOAL <100: ICD-10-CM

## 2020-10-26 RX ORDER — ROSUVASTATIN CALCIUM 20 MG/1
TABLET, COATED ORAL
Qty: 90 TABLET | Refills: 0 | Status: SHIPPED | OUTPATIENT
Start: 2020-10-26 | End: 2021-01-24

## 2020-10-26 NOTE — TELEPHONE ENCOUNTER
Routing refill request to provider for review/approval because:  Labs not current:  Lipids.  Deirdre Partida BSN, RN

## 2020-11-09 DIAGNOSIS — E11.65 TYPE 2 DIABETES MELLITUS WITH HYPERGLYCEMIA, WITHOUT LONG-TERM CURRENT USE OF INSULIN (H): ICD-10-CM

## 2020-11-10 NOTE — TELEPHONE ENCOUNTER
Called and left VM to return call to [phone]. If patient returns call please schedule DM visit per message below. Ivonne Ibarra MA

## 2020-11-19 NOTE — TELEPHONE ENCOUNTER
Patient is scheduled video visit Monday, made lab appointment for tomorrow 11/20/2020 - need labs.

## 2020-11-20 ENCOUNTER — DOCUMENTATION ONLY (OUTPATIENT)
Dept: FAMILY MEDICINE | Facility: CLINIC | Age: 55
End: 2020-11-20

## 2020-11-20 DIAGNOSIS — Z12.5 SCREENING FOR PROSTATE CANCER: ICD-10-CM

## 2020-11-20 DIAGNOSIS — E11.69 TYPE 2 DIABETES MELLITUS WITH OTHER SPECIFIED COMPLICATION, UNSPECIFIED WHETHER LONG TERM INSULIN USE (H): Primary | ICD-10-CM

## 2020-11-23 RX ORDER — GLIMEPIRIDE 4 MG/1
4 TABLET ORAL
Qty: 90 TABLET | Refills: 0 | Status: SHIPPED | OUTPATIENT
Start: 2020-11-23 | End: 2020-12-28

## 2020-12-06 ENCOUNTER — HEALTH MAINTENANCE LETTER (OUTPATIENT)
Age: 55
End: 2020-12-06

## 2020-12-24 DIAGNOSIS — Z12.5 SCREENING FOR PROSTATE CANCER: ICD-10-CM

## 2020-12-24 DIAGNOSIS — E11.69 TYPE 2 DIABETES MELLITUS WITH OTHER SPECIFIED COMPLICATION, UNSPECIFIED WHETHER LONG TERM INSULIN USE (H): ICD-10-CM

## 2020-12-24 LAB
ANION GAP SERPL CALCULATED.3IONS-SCNC: 6 MMOL/L (ref 3–14)
BUN SERPL-MCNC: 19 MG/DL (ref 7–30)
CALCIUM SERPL-MCNC: 9.4 MG/DL (ref 8.5–10.1)
CHLORIDE SERPL-SCNC: 104 MMOL/L (ref 94–109)
CHOLEST SERPL-MCNC: 137 MG/DL
CO2 SERPL-SCNC: 29 MMOL/L (ref 20–32)
CREAT SERPL-MCNC: 1.11 MG/DL (ref 0.66–1.25)
GFR SERPL CREATININE-BSD FRML MDRD: 74 ML/MIN/{1.73_M2}
GLUCOSE SERPL-MCNC: 237 MG/DL (ref 70–99)
HBA1C MFR BLD: 9.5 % (ref 0–5.6)
HDLC SERPL-MCNC: 37 MG/DL
LDLC SERPL CALC-MCNC: ABNORMAL MG/DL
LDLC SERPL DIRECT ASSAY-MCNC: 48 MG/DL
NONHDLC SERPL-MCNC: 100 MG/DL
POTASSIUM SERPL-SCNC: 3.7 MMOL/L (ref 3.4–5.3)
PSA SERPL-ACNC: 6.74 UG/L (ref 0–4)
SODIUM SERPL-SCNC: 139 MMOL/L (ref 133–144)
TRIGL SERPL-MCNC: 403 MG/DL

## 2020-12-24 PROCEDURE — 80061 LIPID PANEL: CPT | Performed by: FAMILY MEDICINE

## 2020-12-24 PROCEDURE — G0103 PSA SCREENING: HCPCS | Performed by: FAMILY MEDICINE

## 2020-12-24 PROCEDURE — 83721 ASSAY OF BLOOD LIPOPROTEIN: CPT | Mod: 59 | Performed by: FAMILY MEDICINE

## 2020-12-24 PROCEDURE — 80048 BASIC METABOLIC PNL TOTAL CA: CPT | Performed by: FAMILY MEDICINE

## 2020-12-24 PROCEDURE — 36415 COLL VENOUS BLD VENIPUNCTURE: CPT | Performed by: FAMILY MEDICINE

## 2020-12-24 PROCEDURE — 83036 HEMOGLOBIN GLYCOSYLATED A1C: CPT | Performed by: FAMILY MEDICINE

## 2020-12-28 ENCOUNTER — MYC MEDICAL ADVICE (OUTPATIENT)
Dept: FAMILY MEDICINE | Facility: CLINIC | Age: 55
End: 2020-12-28

## 2020-12-28 ENCOUNTER — VIRTUAL VISIT (OUTPATIENT)
Dept: FAMILY MEDICINE | Facility: CLINIC | Age: 55
End: 2020-12-28
Payer: COMMERCIAL

## 2020-12-28 DIAGNOSIS — E78.5 HYPERLIPIDEMIA LDL GOAL <100: ICD-10-CM

## 2020-12-28 DIAGNOSIS — R97.20 ELEVATED PROSTATE SPECIFIC ANTIGEN (PSA): ICD-10-CM

## 2020-12-28 DIAGNOSIS — E66.01 MORBID OBESITY (H): ICD-10-CM

## 2020-12-28 DIAGNOSIS — E11.65 TYPE 2 DIABETES MELLITUS WITH HYPERGLYCEMIA, WITHOUT LONG-TERM CURRENT USE OF INSULIN (H): Primary | ICD-10-CM

## 2020-12-28 DIAGNOSIS — I10 HTN, GOAL BELOW 140/90: ICD-10-CM

## 2020-12-28 PROCEDURE — 99214 OFFICE O/P EST MOD 30 MIN: CPT | Mod: GT | Performed by: FAMILY MEDICINE

## 2020-12-28 RX ORDER — GLIMEPIRIDE 4 MG/1
8 TABLET ORAL
Qty: 180 TABLET | Refills: 0 | Status: SHIPPED | OUTPATIENT
Start: 2020-12-28 | End: 2021-04-05

## 2020-12-28 NOTE — PROGRESS NOTES
"Luther Mariee is a 55 year old male who is being evaluated via a billable video visit.      The patient has been notified of following:     \"This video visit will be conducted via a call between you and your physician/provider. We have found that certain health care needs can be provided without the need for an in-person physical exam.  This service lets us provide the care you need with a video conversation.  If a prescription is necessary we can send it directly to your pharmacy.  If lab work is needed we can place an order for that and you can then stop by our lab to have the test done at a later time.    Video visits are billed at different rates depending on your insurance coverage.  Please reach out to your insurance provider with any questions.    If during the course of the call the physician/provider feels a video visit is not appropriate, you will not be charged for this service.\"    Patient has given verbal consent for Video visit? Yes  How would you like to obtain your AVS? MyChart  If you are dropped from the video visit, the video invite should be resent to: Send to e-mail at: [email or web address]  Will anyone else be joining your video visit? No    Subjective     Luther Mariee is a 55 year old male who presents today via video visit for the following health issues:    HPI     Diabetes Follow-up   Things are fine; Metformin and glyburide  Weight coming down, working out more.  Will start checking this week      How often are you checking your blood sugar? Not at all    What concerns do you have today about your diabetes? None     Do you have any of these symptoms? (Select all that apply)  No numbness or tingling in feet.  No redness, sores or blisters on feet.  No complaints of excessive thirst.  No reports of blurry vision.  No significant changes to weight.    Have you had a diabetic eye exam in the last 12 months? No    Hyperlipidemia Follow-Up      Are you regularly taking any " medication or supplement to lower your cholesterol?   Yes- Crestor    Are you having muscle aches or other side effects that you think could be caused by your cholesterol lowering medication?  No    Hypertension Follow-up      Do you check your blood pressure regularly outside of the clinic? Yes     Are you following a low salt diet? Yes    Are your blood pressures ever more than 140 on the top number (systolic) OR more   than 90 on the bottom number (diastolic), for example 140/90? No    BP Readings from Last 2 Encounters:   07/22/19 118/58   12/28/18 124/76     Hemoglobin A1C (%)   Date Value   12/24/2020 9.5 (H)   07/22/2019 11.1 (H)     LDL Cholesterol Calculated (mg/dL)   Date Value   12/24/2020     Cannot estimate LDL when triglyceride exceeds 400 mg/dL   07/22/2019     Cannot estimate LDL when triglyceride exceeds 400 mg/dL     LDL Cholesterol Direct (mg/dL)   Date Value   12/24/2020 48   07/22/2019 105 (H)         How many servings of fruits and vegetables do you eat daily?  2-3    On average, how many sweetened beverages do you drink each day (Examples: soda, juice, sweet tea, etc.  Do NOT count diet or artificially sweetened beverages)?   0    How many days per week do you exercise enough to make your heart beat faster? 7    How many minutes a day do you exercise enough to make your heart beat faster? 15-30 minutes    How many days per week do you miss taking your medication? 0    Video Start Time: 5:36 PM      Objective           Vitals:  No vitals were obtained today due to virtual visit.    Physical Exam     GENERAL: Healthy, alert and no distress  PSYCH: Mentation appears normal, affect normal/bright, judgement and insight intact, normal speech and appearance well-groomed.    Assessment & Plan     Diagnoses and all orders for this visit:    Type 2 diabetes mellitus with hyperglycemia, without long-term current use of insulin (H)  -     glimepiride (AMARYL) 4 MG tablet; Take 2 tablets (8 mg) by mouth  every morning (before breakfast)  -     UROLOGY ADULT REFERRAL; Future    Hyperlipidemia LDL goal <100       -    On crestor    HTN, goal below 140/90        -   Stable on Lisinopril-hydrochlorothiazide    Elevated prostate specific antigen (PSA)  -     UROLOGY ADULT REFERRAL; Future    Morbid obesity (H)        -   Weight loss.    Return in about 1 month (around 1/28/2021) for Routine Visit.    Watson Bhagat MD  [facility]      Video-Visit Details    Type of service:  Video Visit    Video End Time:5:54 PM    Originating Location (pt. Location): Home    Distant Location (provider location):  [facility]     Platform used for Video Visit: Gavino

## 2020-12-30 RX ORDER — METFORMIN HCL 500 MG
500 TABLET, EXTENDED RELEASE 24 HR ORAL 2 TIMES DAILY WITH MEALS
Qty: 180 TABLET | Refills: 0 | Status: SHIPPED | OUTPATIENT
Start: 2020-12-30 | End: 2021-05-24

## 2020-12-30 RX ORDER — CARVEDILOL 25 MG/1
TABLET ORAL
Qty: 180 TABLET | Refills: 0 | Status: SHIPPED | OUTPATIENT
Start: 2020-12-30 | End: 2021-05-24

## 2021-01-02 NOTE — TELEPHONE ENCOUNTER
Reason for Call:   medication refill:    Do you use a [facility]?  Name of the pharmacy and phone number for the current request:  [facility] [id number] [place] [facility], [place] - [address]    Name of the medication requested: Glimepiride    Other request: Pharmacy advised patient unable to refill prescription until January 29th.     Can we leave a detailed message on this number? YES    Phone number patient can be reached at: Cell number on file:    Telephone Information:   Mobile [phone]       Best Time: Anytime.    Call taken on 1/2/2021 at 12:07 PM by Krishna Coffey

## 2021-01-04 NOTE — TELEPHONE ENCOUNTER
Called pharmacy and explained the pt is taking 2 tabs now before he was taking 1 tab daily. Pharmacist talked to the insurance and approved for pt to  today. Called pt and informed pt.  Janki MARIANO CMA

## 2021-01-04 NOTE — TELEPHONE ENCOUNTER
Called pt and check with insurance. Will call the pharmacy and inform pt why they are not releasing the prescription.  Janki MARIANO CMA

## 2021-01-23 DIAGNOSIS — E78.5 HYPERLIPIDEMIA LDL GOAL <100: ICD-10-CM

## 2021-01-24 RX ORDER — ROSUVASTATIN CALCIUM 20 MG/1
TABLET, COATED ORAL
Qty: 90 TABLET | Refills: 0 | Status: SHIPPED | OUTPATIENT
Start: 2021-01-24 | End: 2021-05-24

## 2021-01-24 NOTE — CONFIDENTIAL NOTE
Prescription approved per [facility] Refill Protocol.  Yolanda Anderson RN

## 2021-01-26 ENCOUNTER — VIRTUAL VISIT (OUTPATIENT)
Dept: UROLOGY | Facility: CLINIC | Age: 56
End: 2021-01-26
Attending: FAMILY MEDICINE
Payer: COMMERCIAL

## 2021-01-26 DIAGNOSIS — E11.65 TYPE 2 DIABETES MELLITUS WITH HYPERGLYCEMIA, WITHOUT LONG-TERM CURRENT USE OF INSULIN (H): ICD-10-CM

## 2021-01-26 DIAGNOSIS — R97.20 ELEVATED PROSTATE SPECIFIC ANTIGEN (PSA): ICD-10-CM

## 2021-01-26 DIAGNOSIS — E66.01 MORBID OBESITY (H): ICD-10-CM

## 2021-01-26 PROCEDURE — 99203 OFFICE O/P NEW LOW 30 MIN: CPT | Mod: GT | Performed by: UROLOGY

## 2021-01-26 NOTE — PROGRESS NOTES
gladis is a 55 year old who is being evaluated via a billable video visit.      How would you like to obtain your AVS? MyChart  If the video visit is dropped, the invitation should be resent by: [phone]  Will anyone else be joining your video visit? No      Video Start Time: 850  Assessment & Plan   Problem List Items Addressed This Visit     Morbid obesity (H)    Diabetes mellitus, type 2 (H)      Other Visit Diagnoses     Elevated prostate specific antigen (PSA)        Relevant Orders    PSA tumor marker                           20 minutes spent on the date of the encounter doing chart review, history and exam, documentation and further activities as noted above           Work on weight loss  Regular exercise    No follow-ups on file.    Waldo Mehta MD  [facility]      S: Luther Mariee is a pleasant  55 year old male who was requested to be seen by  Watson Bhagat for a consult with regard to patient's elevated PSA.  His recent PSA was found to be   PSA   Date Value Ref Range Status   12/24/2020 6.74 (H) 0 - 4 ug/L Final     Comment:     Assay Method:  Chemiluminescence using Siemens Vista analyzer   .  His previous PSA was never done previously.  Patient complains of Frequency.  He has no history of elevated PSA.  His AUA Symptom Score:  low.  Current Outpatient Medications   Medication Sig Dispense Refill     alcohol swab prep pads Use to swab area of injection/kaylie as directed. 100 each 3     aspirin 81 MG tablet Take 1 tablet by mouth daily.       blood glucose (NO BRAND SPECIFIED) test strip Use to test blood sugar 3 times daily or as directed. To accompany: Blood Glucose Monitor Brands: per insurance. 100 strip 6     blood glucose calibration (NO BRAND SPECIFIED) solution To accompany: Blood Glucose Monitor Brands: per insurance. 1 Bottle 3     blood glucose monitoring (NO BRAND SPECIFIED) meter device kit Use to test blood sugar 3 times daily or as directed.  Preferred blood glucose meter OR supplies to accompany: Blood Glucose Monitor Brands: per insurance. 1 kit 0     carvedilol (COREG) 25 MG tablet TAKE 1 TABLET BY MOUTH TWICE A DAY WITH MEALS 180 tablet 0     glimepiride (AMARYL) 4 MG tablet Take 2 tablets (8 mg) by mouth every morning (before breakfast) 180 tablet 0     ibuprofen (IBU-200) 200 MG tablet Take 800 mg by mouth.       lisinopril-hydrochlorothiazide (ZESTORETIC) 20-25 MG tablet Take 1 tablet by mouth daily 90 tablet 1     metFORMIN (GLUCOPHAGE-XR) 500 MG 24 hr tablet Take 1 tablet (500 mg) by mouth 2 times daily (with meals) 180 tablet 0     mometasone (ELOCON) 0.1 % ointment Apply sparingly to affected area twice daily for 14 days.  Do not apply to face. 15 g 2     rosuvastatin (CRESTOR) 20 MG tablet TAKE 1 TABLET BY MOUTH EVERY DAY 90 tablet 0     thin (NO BRAND SPECIFIED) lancets Use with lanceting device. To accompany: Blood Glucose Monitor Brands: per insurance. 100 each 6     valACYclovir (VALTREX) 1000 mg tablet Take 2 tablets (2,000 mg) by mouth 2 times daily 4 tablet 1      No Known Allergies   Past Medical History:   Diagnosis Date     Acute renal failure (H) 2010    resolved     Aortic dissection (H) 2/23/10    ascending aorta. sees cv surgeon Dr. herr     Ascending aortic aneurysm (H)      Bicuspid aortic valve     sees card Dr. 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 [place].     Vitamin B12 deficiency      Vitamin D deficiencies      Past Surgical History:   Procedure Laterality Date     CHOLECYSTECTOMY, OPEN       COLONOSCOPY  2010    superficial ulcerws rt colon     ENDOVAS REPAIR, INFRARENL ABDOM AORTIC ANEURYSM/DISSECT; [id number]/AORTO-UNIFEMORAL PROSTHESIS  2/10    aorta dissection     SMALL BOWEL RESECTION  2010      [place]      Family History   Problem Relation Age of Onset     Unknown/Adopted Mother      Unknown/Adopted Father      Unknown/Adopted Maternal Grandmother      Unknown/Adopted Maternal Grandfather      Unknown/Adopted Paternal Grandmother      Unknown/Adopted Paternal Grandfather      He does not have a family history of prostate cancer.  Social History     Socioeconomic History     Marital status: Single     Spouse name: Not on file     Number of children: Not on file     Years of education: Not on file     Highest education level: Not on file   Occupational History     Not on file   Social Needs     Financial resource strain: Not on file     Food insecurity     Worry: Not on file     Inability: Not on file     Transportation needs     Medical: Not on file     Non-medical: Not on file   Tobacco Use     Smoking status: Former Smoker     Quit date: 2001     Years since quittin.1     Smokeless tobacco: Never Used   Substance and Sexual Activity     Alcohol use: No     Alcohol/week: 0.0 standard drinks     Drug use: No     Sexual activity: Yes     Partners: Female     Birth control/protection: Pill   Lifestyle     Physical activity     Days per week: Not on file     Minutes per session: Not on file     Stress: Not on file   Relationships     Social connections     Talks on phone: Not on file     Gets together: Not on file     Attends Spiritism service: Not on file     Active member of club or organization: Not on file     Attends meetings of clubs or organizations: Not on file     Relationship status: Not on file     Intimate partner violence     Fear of current or ex partner: Not on file     Emotionally abused: Not on file     Physically abused: Not on file     Forced sexual activity: Not on file   Other Topics Concern     Parent/sibling w/ CABG, MI or angioplasty before 65F 55M? Not Asked   Social History Narrative     Not on file        REVIEW OF SYSTEMS  =================  C: NEGATIVE for fever,  chills, change in weight  I: NEGATIVE for worrisome rashes, moles or lesions  E/M: NEGATIVE for ear, mouth and throat problems  R: NEGATIVE for significant cough or SHORTNESS OF BREATH  CV:  NEGATIVE for chest pain, palpitations or peripheral edema  GI: NEGATIVE for nausea, abdominal pain, heartburn, or change in bowel habits  NEURO: NEGATIVE  PSYCH: NEGATIVE    Physical Exam:  GENERAL: Healthy, alert and no distress  EYES: Eyes grossly normal to inspection.  No discharge or erythema, or obvious scleral/conjunctival abnormalities.  RESP: No audible wheeze, cough, or visible cyanosis.  No visible retractions or increased work of breathing.    SKIN: Visible skin clear. No significant rash, abnormal pigmentation or lesions.  NEURO: Cranial nerves grossly intact.  Mentation and speech appropriate for age.  PSYCH: Mentation appears normal, affect normal/bright, judgement and insight intact, normal speech and appearance well-groomed.t    Assessment/Plan:       (R97.20) Elevated prostate specific antigen (PSA)  Comment: discussed prostate cancer and elevated psa.  Plan: needs prostate biopsy however his recent glycosylated hemoglobin is 9.5 which needs better DM control before biopsy can be done due to high risk of infection.  Recheck psa in 3 months    Morbid obesity:  F/u with primary MD    DM type 2:  F/u with primary MD.  Urinary frequency is most likely due to DM.      Video-Visit Details    Type of service:  Video Visit    Video End Time:9:09 AM    Originating Location (pt. Location): Home    Distant Location (provider location):  [facility]     Platform used for Video Visit: TulioGummii

## 2021-04-03 DIAGNOSIS — E11.65 TYPE 2 DIABETES MELLITUS WITH HYPERGLYCEMIA, WITHOUT LONG-TERM CURRENT USE OF INSULIN (H): ICD-10-CM

## 2021-04-03 NOTE — LETTER
[facility]  [address]  [place] [address]  [phone]    April 6, 2021    Luther Mariee  [address] [address]      Dear Luther Mariee:    Your provider has sent a 1 time jade refill of glimepiride 4 mg. You are due for an appointment for further refills. We are currently only able to see select in person visits. We ask that you schedule a telephone visit, video visit or evisit (through [email or web address]) with your provider.  Please contact the clinic to schedule an appointment for further refills.     Sincerely,     Your Health Care Team  Red Team  PKC/EN

## 2021-04-05 RX ORDER — GLIMEPIRIDE 4 MG/1
8 TABLET ORAL
Qty: 180 TABLET | Refills: 0 | Status: SHIPPED | OUTPATIENT
Start: 2021-04-05 | End: 2021-05-24

## 2021-04-05 NOTE — TELEPHONE ENCOUNTER
Routing refill request to provider for review/approval because:  Labs not current:    Lab Results   Component Value Date    A1C 9.5 12/24/2020    A1C 11.1 07/22/2019    A1C 11.2 12/28/2018    A1C 6.5 08/31/2015

## 2021-05-21 ENCOUNTER — MYC MEDICAL ADVICE (OUTPATIENT)
Dept: FAMILY MEDICINE | Facility: CLINIC | Age: 56
End: 2021-05-21

## 2021-05-21 DIAGNOSIS — E78.5 HYPERLIPIDEMIA LDL GOAL <100: ICD-10-CM

## 2021-05-21 DIAGNOSIS — E11.65 TYPE 2 DIABETES MELLITUS WITH HYPERGLYCEMIA, WITHOUT LONG-TERM CURRENT USE OF INSULIN (H): ICD-10-CM

## 2021-05-21 DIAGNOSIS — I10 HTN, GOAL BELOW 140/90: ICD-10-CM

## 2021-05-21 RX ORDER — ROSUVASTATIN CALCIUM 20 MG/1
20 TABLET, COATED ORAL DAILY
Qty: 90 TABLET | Refills: 0 | Status: CANCELLED | OUTPATIENT
Start: 2021-05-21

## 2021-05-21 RX ORDER — METFORMIN HCL 500 MG
500 TABLET, EXTENDED RELEASE 24 HR ORAL 2 TIMES DAILY WITH MEALS
Qty: 180 TABLET | Refills: 0 | Status: CANCELLED | OUTPATIENT
Start: 2021-05-21

## 2021-05-21 RX ORDER — GLIMEPIRIDE 4 MG/1
8 TABLET ORAL
Qty: 180 TABLET | Refills: 0 | Status: CANCELLED | OUTPATIENT
Start: 2021-05-21

## 2021-05-21 RX ORDER — CARVEDILOL 25 MG/1
TABLET ORAL
Qty: 180 TABLET | Refills: 0 | Status: CANCELLED | OUTPATIENT
Start: 2021-05-21

## 2021-05-24 DIAGNOSIS — I10 HTN, GOAL BELOW 140/90: ICD-10-CM

## 2021-05-24 DIAGNOSIS — E78.5 HYPERLIPIDEMIA LDL GOAL <100: ICD-10-CM

## 2021-05-24 DIAGNOSIS — E11.65 TYPE 2 DIABETES MELLITUS WITH HYPERGLYCEMIA, WITHOUT LONG-TERM CURRENT USE OF INSULIN (H): ICD-10-CM

## 2021-05-24 RX ORDER — GLIMEPIRIDE 4 MG/1
8 TABLET ORAL
Qty: 180 TABLET | Refills: 0 | Status: SHIPPED | OUTPATIENT
Start: 2021-05-24 | End: 2021-11-01

## 2021-05-24 RX ORDER — CARVEDILOL 25 MG/1
TABLET ORAL
Qty: 180 TABLET | Refills: 0 | Status: SHIPPED | OUTPATIENT
Start: 2021-05-24 | End: 2021-08-12

## 2021-05-24 RX ORDER — LISINOPRIL AND HYDROCHLOROTHIAZIDE 20; 25 MG/1; MG/1
1 TABLET ORAL DAILY
Qty: 90 TABLET | Refills: 0 | Status: SHIPPED | OUTPATIENT
Start: 2021-05-24 | End: 2021-10-26

## 2021-05-24 RX ORDER — METFORMIN HCL 500 MG
500 TABLET, EXTENDED RELEASE 24 HR ORAL 2 TIMES DAILY WITH MEALS
Qty: 180 TABLET | Refills: 0 | Status: SHIPPED | OUTPATIENT
Start: 2021-05-24 | End: 2021-06-08 | Stop reason: DRUGHIGH

## 2021-05-24 RX ORDER — ROSUVASTATIN CALCIUM 20 MG/1
20 TABLET, COATED ORAL DAILY
Qty: 90 TABLET | Refills: 0 | Status: SHIPPED | OUTPATIENT
Start: 2021-05-24 | End: 2022-02-01

## 2021-06-03 DIAGNOSIS — R97.20 ELEVATED PROSTATE SPECIFIC ANTIGEN (PSA): ICD-10-CM

## 2021-06-03 DIAGNOSIS — E11.69 TYPE 2 DIABETES MELLITUS WITH OTHER SPECIFIED COMPLICATION, WITHOUT LONG-TERM CURRENT USE OF INSULIN (H): ICD-10-CM

## 2021-06-03 LAB
CHOLEST SERPL-MCNC: 178 MG/DL
CREAT UR-MCNC: 152 MG/DL
HBA1C MFR BLD: 10.1 % (ref 0–5.6)
HDLC SERPL-MCNC: 43 MG/DL
LDLC SERPL CALC-MCNC: ABNORMAL MG/DL
LDLC SERPL DIRECT ASSAY-MCNC: 70 MG/DL
MICROALBUMIN UR-MCNC: 143 MG/L
MICROALBUMIN/CREAT UR: 94.08 MG/G CR (ref 0–17)
NONHDLC SERPL-MCNC: 135 MG/DL
PSA SERPL-MCNC: 7.86 UG/L (ref 0–4)
TRIGL SERPL-MCNC: 514 MG/DL

## 2021-06-03 PROCEDURE — 36415 COLL VENOUS BLD VENIPUNCTURE: CPT | Performed by: FAMILY MEDICINE

## 2021-06-03 PROCEDURE — 84153 ASSAY OF PSA TOTAL: CPT | Performed by: FAMILY MEDICINE

## 2021-06-03 PROCEDURE — 82043 UR ALBUMIN QUANTITATIVE: CPT | Performed by: FAMILY MEDICINE

## 2021-06-03 PROCEDURE — 80061 LIPID PANEL: CPT | Performed by: FAMILY MEDICINE

## 2021-06-03 PROCEDURE — 83036 HEMOGLOBIN GLYCOSYLATED A1C: CPT | Performed by: FAMILY MEDICINE

## 2021-06-03 PROCEDURE — 83721 ASSAY OF BLOOD LIPOPROTEIN: CPT | Mod: 59 | Performed by: FAMILY MEDICINE

## 2021-06-04 NOTE — PROGRESS NOTES
hector is a 55 year old who is being evaluated via a billable telephone visit.      What phone number would you like to be contacted at? [phone]   How would you like to obtain your AVS? MyChart    Assessment & Plan     Type 2 diabetes mellitus with hyperglycemia, without long-term current use of insulin (H)    A1c not at goal.  Reviewed the nature of diabetes and its complications.  Went over co morbidities, need for good blood sugar control, following treatment plan as well as BP and Cholesterol control. Will increase dose of Metformin to 1000mg twice daily and start Jardiance at 25 mg daily.  Discussed the recheck schedule    - empagliflozin (JARDIANCE) 25 MG TABS tablet; Take 1 tablet (25 mg) by mouth daily  - metFORMIN (GLUCOPHAGE) 1000 MG tablet; Take 1 tablet (1,000 mg) by mouth 2 times daily (with meals)    Hypertriglyceridemia    -  Could be aggravated by high blood sugars; discussed cutting down on sugary foods, weight loss.    Elevated PSA   Referral to urology discussed  - ADULT UROLOGY REFERRAL; Future    Pain in joint, ankle and foot, left    -  Hindering ability to exercise. Will follow up for evaluation and possible xray/referral to podiatry    Dissection of aorta, unspecified portion of aorta (H)    -  Imaging surveillance has not shown any concerns.    Return in about 1 month (around 7/7/2021) for Routine Visit.    Watson Bhagat MD  [facility]    Subjective   Hector is a 55 year old who presents for the following health issues;    HPI     Lt Ankle; to the top of the foot is painful, limiting his activities. Has gained weight    PSA:    DM2   Amaryl 8 mg  Metformin 500 twice     PSA:   7.86   6.74 (referred to urology 1/26/21)      Chief Complaint   Patient presents with     Results     lab work       Ref Range & Units 4d ago 5mo ago 1yr ago    Hemoglobin A1C 0 - 5.6 % 10.1High   9.5High  CM  11.1High        Albumin Random Urine Quantitative with Creat Ratio  Order:  [id number]  Status:  Final result   Visible to patient:  Yes (Heavent) Dx:  Type 2 diabetes mellitus with other s...    Ref Range & Units 4d ago 2yr ago 3yr ago    Creatinine Urine mg/dL 152  146  161     Albumin Urine mg/L mg/L 143  185  120     Albumin Urine mg/g Cr 0 - 17 mg/g Cr 94.08High   126.71High   74.53High                 Lipid panel reflex to direct LDL Fasting  Order: [id number]  Status:  Final result   Visible to patient:  Yes (Heavent) Dx:  Type 2 diabetes mellitus with other s...    Ref Range & Units 4d ago 5mo ago    Cholesterol <200 mg/dL 178  137     Triglycerides <150 mg/dL 514High   403High  CM    Comment: Borderline high:  150-199 mg/dl   High:             200-499 mg/dl   Very high:       >499 mg/dl   Fasting specimen     HDL Cholesterol >39 mg/dL 43  37Low      LDL Cholesterol Calculated <100 mg/dL Cannot estimate LDL when triglyceride exceeds 400 mg/dL  Cannot estimate LDL when triglyceride exceeds 400 mg/dL     Non HDL Cholesterol <130 mg/dL 135High   100    Comment: Above Desirable:  130-159 mg/dl   Borderline high:  160-189 mg/dl   High:             190-219 mg/dl   Very high:       >219 mg/dl                Ref Range & Units 4d ago 5mo ago    PSA 0 - 4 ug/L 7.86High   6.74High  CM            Objective           Vitals:  No vitals were obtained today due to virtual visit.        Phone call duration: 22 minutes

## 2021-06-07 ENCOUNTER — VIRTUAL VISIT (OUTPATIENT)
Dept: FAMILY MEDICINE | Facility: CLINIC | Age: 56
End: 2021-06-07
Payer: COMMERCIAL

## 2021-06-07 DIAGNOSIS — E11.65 TYPE 2 DIABETES MELLITUS WITH HYPERGLYCEMIA, WITHOUT LONG-TERM CURRENT USE OF INSULIN (H): Primary | ICD-10-CM

## 2021-06-07 DIAGNOSIS — I71.00 DISSECTION OF AORTA, UNSPECIFIED PORTION OF AORTA (H): ICD-10-CM

## 2021-06-07 DIAGNOSIS — E78.1 HYPERTRIGLYCERIDEMIA: ICD-10-CM

## 2021-06-07 DIAGNOSIS — R97.20 ELEVATED PSA: ICD-10-CM

## 2021-06-07 DIAGNOSIS — M25.572 PAIN IN JOINT, ANKLE AND FOOT, LEFT: ICD-10-CM

## 2021-06-07 PROCEDURE — 99214 OFFICE O/P EST MOD 30 MIN: CPT | Mod: TEL | Performed by: FAMILY MEDICINE

## 2021-06-28 ENCOUNTER — ANCILLARY PROCEDURE (OUTPATIENT)
Dept: MRI IMAGING | Facility: CLINIC | Age: 56
End: 2021-06-28
Attending: FAMILY MEDICINE
Payer: COMMERCIAL

## 2021-06-28 DIAGNOSIS — Z98.890 S/P AAA (ABDOMINAL AORTIC ANEURYSM) REPAIR: ICD-10-CM

## 2021-06-28 DIAGNOSIS — Z86.79 S/P AAA (ABDOMINAL AORTIC ANEURYSM) REPAIR: ICD-10-CM

## 2021-06-28 PROCEDURE — A9585 GADOBUTROL INJECTION: HCPCS | Performed by: RADIOLOGY

## 2021-06-28 PROCEDURE — 71555 MRI ANGIO CHEST W OR W/O DYE: CPT | Performed by: RADIOLOGY

## 2021-06-28 RX ORDER — GADOBUTROL 604.72 MG/ML
10 INJECTION INTRAVENOUS ONCE
Status: COMPLETED | OUTPATIENT
Start: 2021-06-28 | End: 2021-06-28

## 2021-06-28 RX ADMIN — GADOBUTROL 10 ML: 604.72 INJECTION INTRAVENOUS at 09:43

## 2021-08-08 DIAGNOSIS — I10 HTN, GOAL BELOW 140/90: ICD-10-CM

## 2021-08-10 NOTE — TELEPHONE ENCOUNTER
Left a message for gladis to call the clinic to schedule an appointment. Please help the patient schedule for Diabetes an appointment.  Madeline Singh-  [place]

## 2021-08-12 RX ORDER — CARVEDILOL 25 MG/1
TABLET ORAL
Qty: 60 TABLET | Refills: 0 | Status: SHIPPED | OUTPATIENT
Start: 2021-08-12 | End: 2021-09-16

## 2021-08-13 ENCOUNTER — OFFICE VISIT (OUTPATIENT)
Dept: PODIATRY | Facility: CLINIC | Age: 56
End: 2021-08-13
Payer: COMMERCIAL

## 2021-08-13 ENCOUNTER — ANCILLARY PROCEDURE (OUTPATIENT)
Dept: GENERAL RADIOLOGY | Facility: CLINIC | Age: 56
End: 2021-08-13
Attending: PODIATRIST
Payer: COMMERCIAL

## 2021-08-13 VITALS — SYSTOLIC BLOOD PRESSURE: 122 MMHG | HEART RATE: 66 BPM | DIASTOLIC BLOOD PRESSURE: 70 MMHG

## 2021-08-13 DIAGNOSIS — M79.672 LEFT FOOT PAIN: ICD-10-CM

## 2021-08-13 DIAGNOSIS — M25.572 ACUTE LEFT ANKLE PAIN: ICD-10-CM

## 2021-08-13 DIAGNOSIS — M25.572 ACUTE LEFT ANKLE PAIN: Primary | ICD-10-CM

## 2021-08-13 DIAGNOSIS — E11.8 TYPE 2 DIABETES MELLITUS WITH COMPLICATION, WITHOUT LONG-TERM CURRENT USE OF INSULIN (H): ICD-10-CM

## 2021-08-13 DIAGNOSIS — Q66.72 PES CAVUS OF LEFT FOOT: ICD-10-CM

## 2021-08-13 PROCEDURE — 73610 X-RAY EXAM OF ANKLE: CPT | Mod: LT | Performed by: RADIOLOGY

## 2021-08-13 PROCEDURE — 73630 X-RAY EXAM OF FOOT: CPT | Mod: LT | Performed by: RADIOLOGY

## 2021-08-13 PROCEDURE — 99203 OFFICE O/P NEW LOW 30 MIN: CPT | Performed by: PODIATRIST

## 2021-08-13 NOTE — PATIENT INSTRUCTIONS
We wish you continued good healing. If you have any questions or concerns, please do not hesitate to contact us at [phone]    [facility] (secure e-mail communication and access to your chart) to send a message or to make an appointment.    Please remember to call and schedule a follow up appointment if one was recommended at your earliest convenience.     +++OF MARCH 2020+++ LOCATION AND HOURS HAVE CHANGED    PLEASE CALL CLINICS TO VERIFY DAYS AND TIMES  PODIATRY CLINIC HOURS  TELEPHONE NUMBER    Dr. Julio Cesar ANTHONYPESTIVEN [facility]        Clinics:  [facility]   Tuesday 1PM-6PM  [place]  Wednesday 745AM-330PM  [place]  Thursday/Friday 745AM-230PM  [place] APPOINTMENTS  [phone]    [place] APPOINTMENTS  [phone]          If you need a medication refill, please contact us you may need lab work and/or a follow up visit prior to your refill (i.e. Antifungal medications).    If MRI needed please call Imaging at [phone] or [phone]    HOW DO I GET MY KNEE SCOOTER? Knee scooters can be picked up at ANY Medical Supply stores with your knee scooter Prescription.  OR    Bring your signed prescription to an [facility] showroom.

## 2021-08-13 NOTE — PROGRESS NOTES
Subjective:    Pt is seen today in consult from Dr. Bhagat with the c/c of painful left foot and ankle pain.  Patient states he has had left foot pain for the last 20 years.  Started with doing a lot of walking in [place] with very poor shoes.  States after 1 day of this he could barely not walk.  Been somewhat bothersome ever since.  He points to lateral foot.  He has a new pain now in his left ankle.  He points to anterior central lateral.  Aggravated by activity and relieved by rest.  He denies ecchymosis.  He denies weakness.  He denies numbness or tingling.  Patient is at a sitdown job.  He has diabetes that is uncontrolled.    ROS:  A 10-point review of systems was performed and is positive for that noted in the HPI and as seen above.  All other areas are negative.        No Known Allergies    Current Outpatient Medications   Medication Sig Dispense Refill     alcohol swab prep pads Use to swab area of injection/kaylie as directed. 100 each 3     aspirin 81 MG tablet Take 1 tablet by mouth daily.       blood glucose (NO BRAND SPECIFIED) test strip Use to test blood sugar 3 times daily or as directed. To accompany: Blood Glucose Monitor Brands: per insurance. 100 strip 6     blood glucose calibration (NO BRAND SPECIFIED) solution To accompany: Blood Glucose Monitor Brands: per insurance. 1 Bottle 3     blood glucose monitoring (NO BRAND SPECIFIED) meter device kit Use to test blood sugar 3 times daily or as directed. Preferred blood glucose meter OR supplies to accompany: Blood Glucose Monitor Brands: per insurance. 1 kit 0     carvedilol (COREG) 25 MG tablet +++NEED RECHECK+++TAKE 1 TABLET BY MOUTH TWICE A DAY WITH MEALS 60 tablet 0     empagliflozin (JARDIANCE) 25 MG TABS tablet Take 1 tablet (25 mg) by mouth daily 30 tablet 2     glimepiride (AMARYL) 4 MG tablet Take 2 tablets (8 mg) by mouth every morning (before breakfast) 180 tablet 0     ibuprofen (IBU-200) 200 MG tablet Take 800 mg by mouth.        lisinopril-hydrochlorothiazide (ZESTORETIC) 20-25 MG tablet Take 1 tablet by mouth daily 90 tablet 0     metFORMIN (GLUCOPHAGE) 1000 MG tablet Take 1 tablet (1,000 mg) by mouth 2 times daily (with meals) 180 tablet 0     mometasone (ELOCON) 0.1 % ointment Apply sparingly to affected area twice daily for 14 days.  Do not apply to face. 15 g 2     rosuvastatin (CRESTOR) 20 MG tablet Take 1 tablet (20 mg) by mouth daily 90 tablet 0     thin (NO BRAND SPECIFIED) lancets Use with lanceting device. To accompany: Blood Glucose Monitor Brands: per insurance. 100 each 6     valACYclovir (VALTREX) 1000 mg tablet Take 2 tablets (2,000 mg) by mouth 2 times daily 4 tablet 1       Patient Active Problem List   Diagnosis     Aortic dissection (H)     Splenic infarct     Hypertension goal BP (blood pressure) < 130/80     Health Care Home     Ascending aortic aneurysm (H)     SBO (small bowel obstruction) (H)     Ischemic colitis (H)     Bicuspid aortic valve     Morbid obesity (H)     Vitamin B12 deficiency     Vitamin D deficiency     Chronic pain     Hyperlipidemia LDL goal <100     H/O aortic dissection     CKD (chronic kidney disease) stage 2, GFR 60-89 ml/min     Diabetes mellitus, type 2 (H)     Aortic valve disorder     Calculus of gallbladder     Carotid dissection, bilateral (H)     Type 2 diabetes mellitus with complication, without long-term current use of insulin (H)       Past Medical History:   Diagnosis Date     Acute renal failure (H) 2010    resolved     Aortic dissection (H) 2/23/10    ascending aorta. sees cv surgeon Dr. herr     Ascending aortic aneurysm (H)      Bicuspid aortic valve     sees card Dr. 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 Vane River.     Vitamin B12 deficiency      Vitamin D deficiencies         Past Surgical History:   Procedure Laterality Date     CHOLECYSTECTOMY, OPEN       COLONOSCOPY  2010    superficial ulcerws rt colon     ENDOVAS REPAIR, INFRARENL ABDOM AORTIC ANEURYSM/DISSECT; [id number]/AORTO-UNIFEMORAL PROSTHESIS  2/10    aorta dissection     SMALL BOWEL RESECTION      Dr. Vane River       Family History   Problem Relation Age of Onset     Unknown/Adopted Mother      Unknown/Adopted Father      Unknown/Adopted Maternal Grandmother      Unknown/Adopted Maternal Grandfather      Unknown/Adopted Paternal Grandmother      Unknown/Adopted Paternal Grandfather        Social History     Tobacco Use     Smoking status: Former Smoker     Quit date: 2001     Years since quittin.6     Smokeless tobacco: Never Used   Substance Use Topics     Alcohol use: No     Alcohol/week: 0.0 standard drinks         Exam:    Vitals: /70   Pulse 66   BMI: There is no height or weight on file to calculate BMI.  Height: Data Unavailable    Constitutional/ general:  Pt is in no apparent distress, appears well-nourished.  Cooperative with history and physical exam.     Psych:  The patient answered questions appropriately.  Normal affect.  Seems to have reasonable expectations, in terms of treatment.     Eyes:  Visual scanning/ tracking without deficit.     Ears:  Response to auditory stimuli is normal.  negative hearing aid devices.  Auricles in proper alignment.     Lymphatic:  Popliteal lymph nodes not enlarged.     Lungs:  Non labored breathing, non labored speech. No cough.  No audible wheezing. Even, quiet breathing.       Vascular:  positive pedal pulses bilaterally for both the DP and PT arteries.  CFT < 3 sec.  negative ankle edema.  positive pedal hair growth.    Neuro:  Alert and oriented x 3. Coordinated gait.  Light touch sensation is intact to the L4, L5, S1 distributions. No obvious deficits.  No evidence of neurological-based weakness, spasticity, or contracture in the lower  extremities.      Derm: Normal texture and turgor.  No erythema, ecchymosis, or cyanosis.      Musculoskeletal:    Lower extremity muscle strength is normal.  Patient is ambulatory without an assistive device or brace.  No gross deformities.   Very cavus foot with weightbearing bilateral.  No forefoot or rear foot deformities noted.    Normal ROM all fore foot and rearfoot joints with no pain or crepitus.  No equinus.  No pain with stressing any tendons.  No pain on palpation tibialis anterior or EHL.  No pain palpating or stressing peroneal tendons.  No pain lateral foot anywhere.  Slight pain anterior lateral ankle and area of extensor digitorum longus.  No masses.  No crepitus.  No pain with range of motion.    Radiographic Exam:  X-Ray Findings:  I personally reviewed the films. Unremarkable consistent with a cavus foot      Ref Range & Units 2 mo ago 7 mo ago      Hemoglobin A1C 0 - 5.6 % 10.1High   9.5High  CM          A:    left cavus foot with history lateral pain  Left pain over extensor digitorum longus  Diabetes uncontrolled    Plan:  X-rays taken today left foot and ankle. Discussed with patient x-rays normal. Explained how extreme cavus foot can cause overuse. Good stiff house shoes at all times and I made suggestions. Wrote him a prescription for orthotics to chiqui heel and stabilize lateral column. When not in the house he wear these in a good shoe outside. Discussed physical therapy. He will call if he would like to do this. Discussed immobilize ankle with Cam walker to rest tendon. He declines at this time but if not improving he will consider this. Discussed that his diabetes is out of control. If he can control this better less chance of tendon problems and faster tendon healing. RTC as needed.  Thank you for allowing me participate in the care of this patient.        Julio Cesar Patel, VANCE, FACFAS

## 2021-08-13 NOTE — LETTER
8/13/2021         RE: Luther Mariee  [address] [address]        Dear Colleague,    Thank you for referring your patient, Luther Mariee, to the [facility]. Please see a copy of my visit note below.    Subjective:    Pt is seen today in consult from Dr. Bhagat with the c/c of painful left foot and ankle pain.  Patient states he has had left foot pain for the last 20 years.  Started with doing a lot of walking in [place] with very poor shoes.  States after 1 day of this he could barely not walk.  Been somewhat bothersome ever since.  He points to lateral foot.  He has a new pain now in his left ankle.  He points to anterior central lateral.  Aggravated by activity and relieved by rest.  He denies ecchymosis.  He denies weakness.  He denies numbness or tingling.  Patient is at a sitdown job.  He has diabetes that is uncontrolled.    ROS:  A 10-point review of systems was performed and is positive for that noted in the HPI and as seen above.  All other areas are negative.        No Known Allergies    Current Outpatient Medications   Medication Sig Dispense Refill     alcohol swab prep pads Use to swab area of injection/kaylie as directed. 100 each 3     aspirin 81 MG tablet Take 1 tablet by mouth daily.       blood glucose (NO BRAND SPECIFIED) test strip Use to test blood sugar 3 times daily or as directed. To accompany: Blood Glucose Monitor Brands: per insurance. 100 strip 6     blood glucose calibration (NO BRAND SPECIFIED) solution To accompany: Blood Glucose Monitor Brands: per insurance. 1 Bottle 3     blood glucose monitoring (NO BRAND SPECIFIED) meter device kit Use to test blood sugar 3 times daily or as directed. Preferred blood glucose meter OR supplies to accompany: Blood Glucose Monitor Brands: per insurance. 1 kit 0     carvedilol (COREG) 25 MG tablet +++NEED RECHECK+++TAKE 1 TABLET BY MOUTH TWICE A DAY WITH MEALS 60 tablet 0     empagliflozin  (JARDIANCE) 25 MG TABS tablet Take 1 tablet (25 mg) by mouth daily 30 tablet 2     glimepiride (AMARYL) 4 MG tablet Take 2 tablets (8 mg) by mouth every morning (before breakfast) 180 tablet 0     ibuprofen (IBU-200) 200 MG tablet Take 800 mg by mouth.       lisinopril-hydrochlorothiazide (ZESTORETIC) 20-25 MG tablet Take 1 tablet by mouth daily 90 tablet 0     metFORMIN (GLUCOPHAGE) 1000 MG tablet Take 1 tablet (1,000 mg) by mouth 2 times daily (with meals) 180 tablet 0     mometasone (ELOCON) 0.1 % ointment Apply sparingly to affected area twice daily for 14 days.  Do not apply to face. 15 g 2     rosuvastatin (CRESTOR) 20 MG tablet Take 1 tablet (20 mg) by mouth daily 90 tablet 0     thin (NO BRAND SPECIFIED) lancets Use with lanceting device. To accompany: Blood Glucose Monitor Brands: per insurance. 100 each 6     valACYclovir (VALTREX) 1000 mg tablet Take 2 tablets (2,000 mg) by mouth 2 times daily 4 tablet 1       Patient Active Problem List   Diagnosis     Aortic dissection (H)     Splenic infarct     Hypertension goal BP (blood pressure) < 130/80     Health Care Home     Ascending aortic aneurysm (H)     SBO (small bowel obstruction) (H)     Ischemic colitis (H)     Bicuspid aortic valve     Morbid obesity (H)     Vitamin B12 deficiency     Vitamin D deficiency     Chronic pain     Hyperlipidemia LDL goal <100     H/O aortic dissection     CKD (chronic kidney disease) stage 2, GFR 60-89 ml/min     Diabetes mellitus, type 2 (H)     Aortic valve disorder     Calculus of gallbladder     Carotid dissection, bilateral (H)     Type 2 diabetes mellitus with complication, without long-term current use of insulin (H)       Past Medical History:   Diagnosis Date     Acute renal failure (H) 2010    resolved     Aortic dissection (H) 2/23/10    ascending aorta. sees cv surgeon Dr. herr     Ascending aortic aneurysm (H)      Bicuspid aortic valve     sees card Dr. 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 Vane River.     Vitamin B12 deficiency      Vitamin D deficiencies        Past Surgical History:   Procedure Laterality Date     CHOLECYSTECTOMY, OPEN       COLONOSCOPY  2010    superficial ulcerws rt colon     ENDOVAS REPAIR, INFRARENL ABDOM AORTIC ANEURYSM/DISSECT; [id number]/AORTO-UNIFEMORAL PROSTHESIS  2/10    aorta dissection     SMALL BOWEL RESECTION      Dr. Vane River       Family History   Problem Relation Age of Onset     Unknown/Adopted Mother      Unknown/Adopted Father      Unknown/Adopted Maternal Grandmother      Unknown/Adopted Maternal Grandfather      Unknown/Adopted Paternal Grandmother      Unknown/Adopted Paternal Grandfather        Social History     Tobacco Use     Smoking status: Former Smoker     Quit date: 2001     Years since quittin.6     Smokeless tobacco: Never Used   Substance Use Topics     Alcohol use: No     Alcohol/week: 0.0 standard drinks         Exam:    Vitals: /70   Pulse 66   BMI: There is no height or weight on file to calculate BMI.  Height: Data Unavailable    Constitutional/ general:  Pt is in no apparent distress, appears well-nourished.  Cooperative with history and physical exam.     Psych:  The patient answered questions appropriately.  Normal affect.  Seems to have reasonable expectations, in terms of treatment.     Eyes:  Visual scanning/ tracking without deficit.     Ears:  Response to auditory stimuli is normal.  negative hearing aid devices.  Auricles in proper alignment.     Lymphatic:  Popliteal lymph nodes not enlarged.     Lungs:  Non labored breathing, non labored speech. No cough.  No audible wheezing. Even, quiet breathing.       Vascular:  positive pedal pulses bilaterally for both the DP and PT arteries.  CFT < 3 sec.  negative ankle  edema.  positive pedal hair growth.    Neuro:  Alert and oriented x 3. Coordinated gait.  Light touch sensation is intact to the L4, L5, S1 distributions. No obvious deficits.  No evidence of neurological-based weakness, spasticity, or contracture in the lower extremities.      Derm: Normal texture and turgor.  No erythema, ecchymosis, or cyanosis.      Musculoskeletal:    Lower extremity muscle strength is normal.  Patient is ambulatory without an assistive device or brace.  No gross deformities.   Very cavus foot with weightbearing bilateral.  No forefoot or rear foot deformities noted.    Normal ROM all fore foot and rearfoot joints with no pain or crepitus.  No equinus.  No pain with stressing any tendons.  No pain on palpation tibialis anterior or EHL.  No pain palpating or stressing peroneal tendons.  No pain lateral foot anywhere.  Slight pain anterior lateral ankle and area of extensor digitorum longus.  No masses.  No crepitus.  No pain with range of motion.    Radiographic Exam:  X-Ray Findings:  I personally reviewed the films. Unremarkable consistent with a cavus foot      Ref Range & Units 2 mo ago 7 mo ago      Hemoglobin A1C 0 - 5.6 % 10.1High   9.5High  CM          A:    left cavus foot with history lateral pain  Left pain over extensor digitorum longus  Diabetes uncontrolled    Plan:  X-rays taken today left foot and ankle. Discussed with patient x-rays normal. Explained how extreme cavus foot can cause overuse. Good stiff house shoes at all times and I made suggestions. Wrote him a prescription for orthotics to chiqui heel and stabilize lateral column. When not in the house he wear these in a good shoe outside. Discussed physical therapy. He will call if he would like to do this. Discussed immobilize ankle with Cam walker to rest tendon. He declines at this time but if not improving he will consider this. Discussed that his diabetes is out of control. If he can control this better less chance of  tendon problems and faster tendon healing. RTC as needed.  Thank you for allowing me participate in the care of this patient.        Julio Cesar Patel DPM, FACFAS            Again, thank you for allowing me to participate in the care of your patient.        Sincerely,        Julio Cesar Patel DPM

## 2021-09-05 DIAGNOSIS — E11.65 TYPE 2 DIABETES MELLITUS WITH HYPERGLYCEMIA, WITHOUT LONG-TERM CURRENT USE OF INSULIN (H): ICD-10-CM

## 2021-09-14 DIAGNOSIS — I10 HTN, GOAL BELOW 140/90: ICD-10-CM

## 2021-09-16 RX ORDER — CARVEDILOL 25 MG/1
TABLET ORAL
Qty: 60 TABLET | Refills: 0 | Status: SHIPPED | OUTPATIENT
Start: 2021-09-16 | End: 2021-10-12

## 2021-09-16 NOTE — TELEPHONE ENCOUNTER
Prescription approved per [facility] [place]  Due for visit for further refills, patient is scheduled on 9/24/21  Bruna Shelton RN

## 2021-09-26 ENCOUNTER — HEALTH MAINTENANCE LETTER (OUTPATIENT)
Age: 56
End: 2021-09-26

## 2021-10-10 DIAGNOSIS — I10 HTN, GOAL BELOW 140/90: ICD-10-CM

## 2021-10-12 RX ORDER — CARVEDILOL 25 MG/1
TABLET ORAL
Qty: 180 TABLET | Refills: 1 | Status: SHIPPED | OUTPATIENT
Start: 2021-10-12 | End: 2022-02-01

## 2021-10-12 NOTE — TELEPHONE ENCOUNTER
"Requested Prescriptions   Pending Prescriptions Disp Refills     carvedilol (COREG) 25 MG tablet [Pharmacy Med Name: CARVEDILOL 25 MG TABLET] 60 tablet 0     Sig: TAKE 1 TABLET BY MOUTH TWICE A DAY WITH MEALS       Beta-Blockers Protocol Passed - 10/10/2021 11:30 AM        Passed - Blood pressure under 140/90 in past 12 months     BP Readings from Last 3 Encounters:   08/13/21 122/70   07/22/19 118/58   12/28/18 124/76           Passed - Patient is age 6 or older        Passed - Recent (12 mo) or future (30 days) visit within the authorizing provider's specialty     Patient has had an office visit with the authorizing provider or a provider within the authorizing providers department within the previous 12 mos or has a future within next 30 days. See \"Patient Info\" tab in inbasket, or \"Choose Columns\" in Meds & Orders section of the refill encounter.            Passed - Medication is active on med list         Patient has an appointment 11/8/21  Return in about 1 month (around 7/7/2021) for Routine Visit.    Hi,    Diabetes and cholesterol/triglyceride levels not well controlled; please schedule appointment to review treatment plan.     Watson Bhagat MD  "

## 2021-10-25 DIAGNOSIS — I10 HTN, GOAL BELOW 140/90: ICD-10-CM

## 2021-10-26 RX ORDER — LISINOPRIL AND HYDROCHLOROTHIAZIDE 20; 25 MG/1; MG/1
TABLET ORAL
Qty: 30 TABLET | Refills: 0 | Status: SHIPPED | OUTPATIENT
Start: 2021-10-26 | End: 2021-11-26

## 2021-10-26 NOTE — TELEPHONE ENCOUNTER
"Requested Prescriptions   Pending Prescriptions Disp Refills     lisinopril-hydrochlorothiazide (ZESTORETIC) 20-25 MG tablet [Pharmacy Med Name: LISINOPRIL-HCTZ 20-25 MG TAB] 90 tablet 0     Sig: TAKE 1 TABLET BY MOUTH EVERY DAY       Diuretics (Including Combos) Protocol Passed - 10/25/2021 12:26 AM        Passed - Blood pressure under 140/90 in past 12 months     BP Readings from Last 3 Encounters:   08/13/21 122/70   07/22/19 118/58   12/28/18 124/76           Passed - Recent (12 mo) or future (30 days) visit within the authorizing provider's specialty     Patient has had an office visit with the authorizing provider or a provider within the authorizing providers department within the previous 12 mos or has a future within next 30 days. See \"Patient Info\" tab in inbasket, or \"Choose Columns\" in Meds & Orders section of the refill encounter.            Passed - Medication is active on med list        Passed - Patient is age 18 or older        Passed - Normal serum creatinine on file in past 12 months     Recent Labs   Lab Test 12/24/20  0953   CR 1.11            Passed - Normal serum potassium on file in past 12 months     Recent Labs   Lab Test 12/24/20  0953   POTASSIUM 3.7            Passed - Normal serum sodium on file in past 12 months     Recent Labs   Lab Test 12/24/20  0953              ACE Inhibitors (Including Combos) Protocol Passed - 10/25/2021 12:26 AM        Passed - Blood pressure under 140/90 in past 12 months     BP Readings from Last 3 Encounters:   08/13/21 122/70   07/22/19 118/58   12/28/18 124/76           Passed - Recent (12 mo) or future (30 days) visit within the authorizing provider's specialty     Patient has had an office visit with the authorizing provider or a provider within the authorizing providers department within the previous 12 mos or has a future within next 30 days. See \"Patient Info\" tab in inbasket, or \"Choose Columns\" in Meds & Orders section of the refill encounter. "            Passed - Medication is active on med list        Passed - Patient is age 18 or older        Passed - Normal serum creatinine on file in past 12 months     Recent Labs   Lab Test 12/24/20  0953   CR 1.11     Ok to refill medication if creatinine is low          Passed - Normal serum potassium on file in past 12 months     Recent Labs   Lab Test 12/24/20  0953   POTASSIUM 3.7                Last virtual visit: 6/7/21    Return in about 1 month (around 7/7/2021) for Routine Visit.     Watson Bhagat MD  [facility]    Medication is being filled for 1 time refill only due to:  Patient needs labs after 12/24/21. Patient needs to be seen because last virtual appointment was 6/7/21. Patient has an appointment scheduled 11/8/21  Patient has an appointment 11/8/21

## 2021-11-23 DIAGNOSIS — I10 HTN, GOAL BELOW 140/90: ICD-10-CM

## 2021-11-26 RX ORDER — LISINOPRIL AND HYDROCHLOROTHIAZIDE 20; 25 MG/1; MG/1
TABLET ORAL
Qty: 30 TABLET | Refills: 0 | Status: SHIPPED | OUTPATIENT
Start: 2021-11-26 | End: 2021-12-31

## 2021-11-26 NOTE — TELEPHONE ENCOUNTER
"Prescription approved per [facility] Refill Protocol.  Requested Prescriptions   Pending Prescriptions Disp Refills     lisinopril-hydrochlorothiazide (ZESTORETIC) 20-25 MG tablet [Pharmacy Med Name: LISINOPRIL-HCTZ 20-25 MG TAB] 30 tablet 0     Sig: TAKE 1 TABLET BY MOUTH EVERY DAY       Diuretics (Including Combos) Protocol Passed - 11/23/2021  1:33 PM        Passed - Blood pressure under 140/90 in past 12 months     BP Readings from Last 3 Encounters:   08/13/21 122/70   07/22/19 118/58   12/28/18 124/76                 Passed - Recent (12 mo) or future (30 days) visit within the authorizing provider's specialty     Patient has had an office visit with the authorizing provider or a provider within the authorizing providers department within the previous 12 mos or has a future within next 30 days. See \"Patient Info\" tab in inbasket, or \"Choose Columns\" in Meds & Orders section of the refill encounter.              Passed - Medication is active on med list        Passed - Patient is age 18 or older        Passed - Normal serum creatinine on file in past 12 months     Recent Labs   Lab Test 12/24/20  0953   CR 1.11              Passed - Normal serum potassium on file in past 12 months     Recent Labs   Lab Test 12/24/20  0953   POTASSIUM 3.7                    Passed - Normal serum sodium on file in past 12 months     Recent Labs   Lab Test 12/24/20  0953                ACE Inhibitors (Including Combos) Protocol Passed - 11/23/2021  1:33 PM        Passed - Blood pressure under 140/90 in past 12 months     BP Readings from Last 3 Encounters:   08/13/21 122/70   07/22/19 118/58   12/28/18 124/76                 Passed - Recent (12 mo) or future (30 days) visit within the authorizing provider's specialty     Patient has had an office visit with the authorizing provider or a provider within the authorizing providers department within the previous 12 mos or has a future within next 30 days. See \"Patient Info\" tab in " "inbasket, or \"Choose Columns\" in Meds & Orders section of the refill encounter.              Passed - Medication is active on med list        Passed - Patient is age 18 or older        Passed - Normal serum creatinine on file in past 12 months     Recent Labs   Lab Test 12/24/20  0953   CR 1.11       Ok to refill medication if creatinine is low          Passed - Normal serum potassium on file in past 12 months     Recent Labs   Lab Test 12/24/20  0953   POTASSIUM 3.7                Deirdre Lopez RN on 11/26/2021 at 5:12 PM    "

## 2021-12-10 DIAGNOSIS — E11.65 TYPE 2 DIABETES MELLITUS WITH HYPERGLYCEMIA, WITHOUT LONG-TERM CURRENT USE OF INSULIN (H): ICD-10-CM

## 2021-12-11 NOTE — TELEPHONE ENCOUNTER
Medication is being filled for 1 time refill only due to:  Patient needs to be seen because overdue for recheck.   Pt has appt end of month.  Yolanda Anderson RN

## 2021-12-28 DIAGNOSIS — I10 HTN, GOAL BELOW 140/90: ICD-10-CM

## 2021-12-29 NOTE — TELEPHONE ENCOUNTER
"Requested Prescriptions   Pending Prescriptions Disp Refills     lisinopril-hydrochlorothiazide (ZESTORETIC) 20-25 MG tablet [Pharmacy Med Name: LISINOPRIL-HCTZ 20-25 MG TAB] 30 tablet 0     Sig: TAKE 1 TABLET BY MOUTH EVERY DAY       Diuretics (Including Combos) Protocol Failed - 12/28/2021  8:34 AM        Failed - Normal serum creatinine on file in past 12 months     Recent Labs   Lab Test 12/24/20  0953   CR 1.11            Failed - Normal serum potassium on file in past 12 months     Recent Labs   Lab Test 12/24/20  0953   POTASSIUM 3.7            Failed - Normal serum sodium on file in past 12 months     Recent Labs   Lab Test 12/24/20  0953               Passed - Blood pressure under 140/90 in past 12 months     BP Readings from Last 3 Encounters:   08/13/21 122/70   07/22/19 118/58   12/28/18 124/76           Passed - Recent (12 mo) or future (30 days) visit within the authorizing provider's specialty     Patient has had an office visit with the authorizing provider or a provider within the authorizing providers department within the previous 12 mos or has a future within next 30 days. See \"Patient Info\" tab in inbasket, or \"Choose Columns\" in Meds & Orders section of the refill encounter.            Passed - Medication is active on med list        Passed - Patient is age 18 or older       ACE Inhibitors (Including Combos) Protocol Failed - 12/28/2021  8:34 AM        Failed - Normal serum creatinine on file in past 12 months     Recent Labs   Lab Test 12/24/20  0953   CR 1.11     Ok to refill medication if creatinine is low          Failed - Normal serum potassium on file in past 12 months     Recent Labs   Lab Test 12/24/20  0953   POTASSIUM 3.7           Passed - Blood pressure under 140/90 in past 12 months     BP Readings from Last 3 Encounters:   08/13/21 122/70   07/22/19 118/58   12/28/18 124/76           Passed - Recent (12 mo) or future (30 days) visit within the authorizing provider's " "specialty     Patient has had an office visit with the authorizing provider or a provider within the authorizing providers department within the previous 12 mos or has a future within next 30 days. See \"Patient Info\" tab in inbasket, or \"Choose Columns\" in Meds & Orders section of the refill encounter.              Passed - Medication is active on med list        Passed - Patient is age 18 or older           Routing refill request to provider for review/approval because:  Labs not current:      Diuretics (Including Combos) Protocol Failed - 12/28/2021  8:34 AM       Failed - Normal serum creatinine on file in past 12 months    Recent Labs   Lab Test 12/24/20  0953   CR 1.11           Failed - Normal serum potassium on file in past 12 months    Recent Labs   Lab Test 12/24/20  0953   POTASSIUM 3.7           Failed - Normal serum sodium on file in past 12 months    Recent Labs   Lab Test 12/24/20  0953                ACE Inhibitors (Including Combos) Protocol Failed - 12/28/2021  8:34 AM       Failed - Normal serum creatinine on file in past 12 months    Recent Labs   Lab Test 12/24/20  0953   CR 1.11     Ok to refill medication if creatinine is low         Failed - Normal serum potassium on file in past 12 months    Recent Labs   Lab Test 12/24/20  0953   POTASSIUM 3.7            "

## 2021-12-31 RX ORDER — LISINOPRIL AND HYDROCHLOROTHIAZIDE 20; 25 MG/1; MG/1
TABLET ORAL
Qty: 90 TABLET | Refills: 0 | Status: SHIPPED | OUTPATIENT
Start: 2021-12-31 | End: 2022-02-01

## 2022-01-02 DIAGNOSIS — E11.65 TYPE 2 DIABETES MELLITUS WITH HYPERGLYCEMIA, WITHOUT LONG-TERM CURRENT USE OF INSULIN (H): ICD-10-CM

## 2022-01-16 ENCOUNTER — HEALTH MAINTENANCE LETTER (OUTPATIENT)
Age: 57
End: 2022-01-16

## 2022-01-17 ENCOUNTER — OFFICE VISIT (OUTPATIENT)
Dept: FAMILY MEDICINE | Facility: CLINIC | Age: 57
End: 2022-01-17
Payer: COMMERCIAL

## 2022-01-17 VITALS
TEMPERATURE: 97.5 F | RESPIRATION RATE: 14 BRPM | HEART RATE: 62 BPM | BODY MASS INDEX: 32.41 KG/M2 | SYSTOLIC BLOOD PRESSURE: 104 MMHG | OXYGEN SATURATION: 95 % | HEIGHT: 67 IN | WEIGHT: 206.5 LBS | DIASTOLIC BLOOD PRESSURE: 63 MMHG

## 2022-01-17 DIAGNOSIS — E66.01 MORBID OBESITY (H): ICD-10-CM

## 2022-01-17 DIAGNOSIS — Z86.79 HISTORY OF REPAIR OF DISSECTION OF ABDOMINAL AORTA: ICD-10-CM

## 2022-01-17 DIAGNOSIS — L30.9 ECZEMA, UNSPECIFIED TYPE: ICD-10-CM

## 2022-01-17 DIAGNOSIS — E78.1 HYPERTRIGLYCERIDEMIA: ICD-10-CM

## 2022-01-17 DIAGNOSIS — E11.8 TYPE 2 DIABETES MELLITUS WITH COMPLICATION, WITHOUT LONG-TERM CURRENT USE OF INSULIN (H): Primary | ICD-10-CM

## 2022-01-17 DIAGNOSIS — R97.20 ELEVATED PROSTATE SPECIFIC ANTIGEN (PSA): ICD-10-CM

## 2022-01-17 DIAGNOSIS — I71.00 DISSECTION OF AORTA, UNSPECIFIED PORTION OF AORTA (H): ICD-10-CM

## 2022-01-17 DIAGNOSIS — Z98.890 HISTORY OF REPAIR OF DISSECTION OF ABDOMINAL AORTA: ICD-10-CM

## 2022-01-17 DIAGNOSIS — Z23 NEED FOR VACCINATION: ICD-10-CM

## 2022-01-17 LAB
ANION GAP SERPL CALCULATED.3IONS-SCNC: 6 MMOL/L (ref 3–14)
BUN SERPL-MCNC: 15 MG/DL (ref 7–30)
CALCIUM SERPL-MCNC: 9.5 MG/DL (ref 8.5–10.1)
CHLORIDE BLD-SCNC: 103 MMOL/L (ref 94–109)
CHOLEST SERPL-MCNC: 94 MG/DL
CO2 SERPL-SCNC: 29 MMOL/L (ref 20–32)
CREAT SERPL-MCNC: 0.96 MG/DL (ref 0.66–1.25)
FASTING STATUS PATIENT QL REPORTED: YES
GFR SERPL CREATININE-BSD FRML MDRD: >90 ML/MIN/1.73M2
GLUCOSE BLD-MCNC: 164 MG/DL (ref 70–99)
HBA1C MFR BLD: 8.8 % (ref 0–5.6)
HDLC SERPL-MCNC: 37 MG/DL
LDLC SERPL CALC-MCNC: 8 MG/DL
NONHDLC SERPL-MCNC: 57 MG/DL
POTASSIUM BLD-SCNC: 3.7 MMOL/L (ref 3.4–5.3)
PSA SERPL-MCNC: 9.14 UG/L (ref 0–4)
SODIUM SERPL-SCNC: 138 MMOL/L (ref 133–144)
TRIGL SERPL-MCNC: 247 MG/DL

## 2022-01-17 PROCEDURE — 99207 PR FOOT EXAM NO CHARGE: CPT | Performed by: FAMILY MEDICINE

## 2022-01-17 PROCEDURE — 99214 OFFICE O/P EST MOD 30 MIN: CPT | Mod: 25 | Performed by: FAMILY MEDICINE

## 2022-01-17 PROCEDURE — G0103 PSA SCREENING: HCPCS | Performed by: FAMILY MEDICINE

## 2022-01-17 PROCEDURE — 80061 LIPID PANEL: CPT | Performed by: FAMILY MEDICINE

## 2022-01-17 PROCEDURE — 90750 HZV VACC RECOMBINANT IM: CPT | Performed by: FAMILY MEDICINE

## 2022-01-17 PROCEDURE — 83036 HEMOGLOBIN GLYCOSYLATED A1C: CPT | Performed by: FAMILY MEDICINE

## 2022-01-17 PROCEDURE — 80048 BASIC METABOLIC PNL TOTAL CA: CPT | Performed by: FAMILY MEDICINE

## 2022-01-17 PROCEDURE — 36415 COLL VENOUS BLD VENIPUNCTURE: CPT | Performed by: FAMILY MEDICINE

## 2022-01-17 PROCEDURE — 90471 IMMUNIZATION ADMIN: CPT | Performed by: FAMILY MEDICINE

## 2022-01-17 RX ORDER — MOMETASONE FUROATE 1 MG/G
OINTMENT TOPICAL
Qty: 15 G | Refills: 2 | Status: SHIPPED | OUTPATIENT
Start: 2022-01-17 | End: 2023-05-22

## 2022-01-17 ASSESSMENT — MIFFLIN-ST. JEOR: SCORE: 1730.39

## 2022-01-17 ASSESSMENT — PAIN SCALES - GENERAL: PAINLEVEL: NO PAIN (0)

## 2022-01-17 NOTE — PROGRESS NOTES
"Assessment & Plan     Type 2 diabetes mellitus with complication, without long-term current use of insulin (H)  A1c not at goal, discussed lifestyle changes including healthy diet, regular exercise and weight loss.  We will also start Prandin and recheck in 2 months.    Also did review the complications of uncontrolled diabetes including increased risk of heart disease stroke and renal complications.  - OPTOMETRY REFERRAL; Future  - HEMOGLOBIN A1C; Future  - BASIC METABOLIC PANEL; Future  - EYE EXAM (SIMPLE-NONBILLABLE)  - FOOT EXAM  - BASIC METABOLIC PANEL  - HEMOGLOBIN A1C    Hypertriglyceridemia  Discussed goals of glycemic control  - Lipid panel reflex to direct LDL Fasting; Future  - Lipid panel reflex to direct LDL Fasting    Elevated prostate specific antigen (PSA)  Discussed follow-up with urology, already established care  - PSA, screen; Future  - PSA, screen    History of repair of dissection of abdominal aorta    -   Was noted.    Eczema, unspecified type  Reviewed mometasone ointment  - mometasone (ELOCON) 0.1 % external ointment; Apply sparingly to affected area twice daily for 14 days.  Do not apply to face.      BMI 32.0-32.9,adult    Estimated body mass index is 32.04 kg/m  as calculated from the following:    Height as of this encounter: 1.71 m (5' 7.32\").    Weight as of this encounter: 93.7 kg (206 lb 8 oz).   Weight management plan: Discussed healthy diet and exercise guidelines      Return in about 2 months (around 3/17/2022) for Routine Visit.    Watson Bhagat MD  [facility]     Lutherted Mariee is a 56 year old male who presents to clinic today for the following health issues accompanied by his none:    History of Present Illness       He eats 2-3 servings of fruits and vegetables daily.He consumes 0 sweetened beverage(s) daily.He exercises with enough effort to increase his heart rate 20 to 29 minutes per day.    He is taking medications " regularly.     Thoracic aorta disease, post repair; S/P AAA  (abdominal aortic aneurysm) repair; S/P AAA (abdominal aortic  aneurysm) repair.      -Doing okay    Diabetes Follow-up      How often are you checking your blood sugar?  Blood sugars have been 177- 200 on average.     What concerns do you have today about your diabetes? None     Do you have any of these symptoms? (Select all that apply)     Weight constantly fluctuating up and down.   Have you had a diabetic eye exam in the last 12 months? Patient is due and aware.     Hyperlipidemia Follow-Up      Are you regularly taking any medication or supplement to lower your cholesterol?   CRESTOR 20 MG    Are you having muscle aches or other side effects that you think could be caused by your cholesterol lowering medication?  No    Hypertension Follow-up      Do you check your blood pressure regularly outside of the clinic? No     Are you following a low salt diet? Yes    Are your blood pressures ever more than 140 on the top number (systolic) OR more   than 90 on the bottom number (diastolic), for example 140/90? No    BP Readings from Last 2 Encounters:   01/17/22 104/63   08/13/21 122/70     Hemoglobin A1C POCT (%)   Date Value   06/03/2021 10.1 (H)   12/24/2020 9.5 (H)     LDL Cholesterol Calculated (mg/dL)   Date Value   06/03/2021     Cannot estimate LDL when triglyceride exceeds 400 mg/dL   12/24/2020     Cannot estimate LDL when triglyceride exceeds 400 mg/dL     LDL Cholesterol Direct (mg/dL)   Date Value   06/03/2021 70   12/24/2020 48     Wt Readings from Last 4 Encounters:   01/17/22 93.7 kg (206 lb 8 oz)   07/22/19 92.1 kg (203 lb)   12/28/18 93.2 kg (205 lb 6.4 oz)   01/15/18 94.8 kg (209 lb)     Elevated PSA    Plan: needs prostate biopsy however his recent glycosylated hemoglobin is 9.5 which needs better DM control before biopsy can be done due to high risk of infection.  Recheck psa in 3 months     Shots:   Does not do flu shots.   Covid booster:  "done at [facility] pharmacy.      Review of Systems   Constitutional, HEENT, cardiovascular, pulmonary, gi and gu systems are negative, except as otherwise noted.      Objective    /63   Pulse 62   Temp 97.5  F (36.4  C) (Oral)   Resp 14   Ht 1.71 m (5' 7.32\")   Wt 93.7 kg (206 lb 8 oz)   SpO2 95%   BMI 32.04 kg/m    Body mass index is 32.04 kg/m .  Physical Exam   GENERAL: healthy, alert and no distress  NECK: no adenopathy and thyroid normal to palpation  RESP: lungs clear to auscultation - no rales, rhonchi or wheezes  CV: regular rate and rhythm, no murmur, click or rub, no peripheral edema  ABDOMEN: soft, nontender,  no masses and bowel sounds normal  MS: no gross musculoskeletal defects noted, no edema  Diabetic foot exam: normal DP and PT pulses, no trophic changes or ulcerative lesions and normal sensory exam        Results for orders placed or performed in visit on 01/17/22   PSA, screen     Status: Abnormal   Result Value Ref Range    Prostate Specific Antigen Screen 9.14 (H) 0.00 - 4.00 ug/L   BASIC METABOLIC PANEL     Status: Abnormal   Result Value Ref Range    Sodium 138 133 - 144 mmol/L    Potassium 3.7 3.4 - 5.3 mmol/L    Chloride 103 94 - 109 mmol/L    Carbon Dioxide (CO2) 29 20 - 32 mmol/L    Anion Gap 6 3 - 14 mmol/L    Urea Nitrogen 15 7 - 30 mg/dL    Creatinine 0.96 0.66 - 1.25 mg/dL    Calcium 9.5 8.5 - 10.1 mg/dL    Glucose 164 (H) 70 - 99 mg/dL    GFR Estimate >90 >60 mL/min/1.73m2   HEMOGLOBIN A1C     Status: Abnormal   Result Value Ref Range    Hemoglobin A1C 8.8 (H) 0.0 - 5.6 %   Lipid panel reflex to direct LDL Fasting     Status: Abnormal   Result Value Ref Range    Cholesterol 94 <200 mg/dL    Triglycerides 247 (H) <150 mg/dL    Direct Measure HDL 37 (L) >=40 mg/dL    LDL Cholesterol Calculated 8 <=100 mg/dL    Non HDL Cholesterol 57 <130 mg/dL    Patient Fasting > 8hrs? Yes     Narrative    Cholesterol  Desirable:  <200 mg/dL    Triglycerides  Normal:  Less than 150 " mg/dL  Borderline High:  150-199 mg/dL  High:  200-499 mg/dL  Very High:  Greater than or equal to 500 mg/dL    Direct Measure HDL  Female:  Greater than or equal to 50 mg/dL   Male:  Greater than or equal to 40 mg/dL    LDL Cholesterol  Desirable:  <100mg/dL  Above Desirable:  100-129 mg/dL   Borderline High:  130-159 mg/dL   High:  160-189 mg/dL   Very High:  >= 190 mg/dL    Non HDL Cholesterol  Desirable:  130 mg/dL  Above Desirable:  130-159 mg/dL  Borderline High:  160-189 mg/dL  High:  190-219 mg/dL  Very High:  Greater than or equal to 220 mg/dL

## 2022-01-18 RX ORDER — REPAGLINIDE 1 MG/1
1 TABLET ORAL
Qty: 180 TABLET | Refills: 1 | Status: SHIPPED | OUTPATIENT
Start: 2022-01-18 | End: 2022-03-16

## 2022-01-30 DIAGNOSIS — I10 HTN, GOAL BELOW 140/90: ICD-10-CM

## 2022-01-30 DIAGNOSIS — E11.65 TYPE 2 DIABETES MELLITUS WITH HYPERGLYCEMIA, WITHOUT LONG-TERM CURRENT USE OF INSULIN (H): ICD-10-CM

## 2022-01-30 DIAGNOSIS — E78.5 HYPERLIPIDEMIA LDL GOAL <100: ICD-10-CM

## 2022-02-01 RX ORDER — LISINOPRIL AND HYDROCHLOROTHIAZIDE 20; 25 MG/1; MG/1
TABLET ORAL
Qty: 90 TABLET | Refills: 3 | Status: SHIPPED | OUTPATIENT
Start: 2022-02-01 | End: 2023-03-13

## 2022-02-01 RX ORDER — GLIMEPIRIDE 4 MG/1
8 TABLET ORAL
Qty: 180 TABLET | Refills: 0 | Status: SHIPPED | OUTPATIENT
Start: 2022-02-01 | End: 2022-05-03

## 2022-02-01 RX ORDER — CARVEDILOL 25 MG/1
TABLET ORAL
Qty: 180 TABLET | Refills: 3 | Status: SHIPPED | OUTPATIENT
Start: 2022-02-01 | End: 2023-03-13

## 2022-02-01 RX ORDER — ROSUVASTATIN CALCIUM 20 MG/1
TABLET, COATED ORAL
Qty: 90 TABLET | Refills: 3 | Status: SHIPPED | OUTPATIENT
Start: 2022-02-01 | End: 2023-02-14

## 2022-02-01 NOTE — TELEPHONE ENCOUNTER
"Prescription approved per [facility] Refill Protocol.  Requested Prescriptions   Pending Prescriptions Disp Refills     metFORMIN (GLUCOPHAGE) 1000 MG tablet [Pharmacy Med Name: METFORMIN HCL 1,000 MG TABLET] 60 tablet 0     Sig: TAKE 1 TABLET (1,000 MG) BY MOUTH 2 TIMES DAILY (WITH MEALS) ++NEED RECHECK++       Biguanide Agents Passed - 1/30/2022 11:58 AM        Passed - Patient is age 10 or older        Passed - Patient has documented A1c within the specified period of time.     If HgbA1C is 8 or greater, it needs to be on file within the past 3 months.  If less than 8, must be on file within the past 6 months.     Recent Labs   Lab Test 01/17/22  1038   A1C 8.8*             Passed - Patient's CR is NOT>1.4 OR Patient's EGFR is NOT<45 within past 12 mos.     Recent Labs   Lab Test 01/17/22  1038 12/24/20  0953   GFRESTIMATED >90 74   GFRESTBLACK  --  86       Recent Labs   Lab Test 01/17/22  1038   CR 0.96             Passed - Patient does NOT have a diagnosis of CHF.        Passed - Medication is active on med list        Passed - Recent (6 mo) or future (30 days) visit within the authorizing provider's specialty     Patient had office visit in the last 6 months or has a visit in the next 30 days with authorizing provider or within the authorizing provider's specialty.  See \"Patient Info\" tab in inbasket, or \"Choose Columns\" in Meds & Orders section of the refill encounter.               carvedilol (COREG) 25 MG tablet [Pharmacy Med Name: CARVEDILOL 25 MG TABLET] 180 tablet 1     Sig: TAKE 1 TABLET BY MOUTH TWICE A DAY WITH MEALS       Beta-Blockers Protocol Passed - 1/30/2022 11:58 AM        Passed - Blood pressure under 140/90 in past 12 months     BP Readings from Last 3 Encounters:   01/17/22 104/63   08/13/21 122/70   07/22/19 118/58                 Passed - Patient is age 6 or older        Passed - Recent (12 mo) or future (30 days) visit within the authorizing provider's specialty     Patient has had an office " "visit with the authorizing provider or a provider within the authorizing providers department within the previous 12 mos or has a future within next 30 days. See \"Patient Info\" tab in inbasket, or \"Choose Columns\" in Meds & Orders section of the refill encounter.              Passed - Medication is active on med list           glimepiride (AMARYL) 4 MG tablet [Pharmacy Med Name: GLIMEPIRIDE 4 MG TABLET] 180 tablet 0     Sig: TAKE 2 TABLETS (8 MG) BY MOUTH EVERY MORNING (BEFORE BREAKFAST)       Sulfonylurea Agents Passed - 1/30/2022 11:58 AM        Passed - Patient has documented A1c within the specified period of time.     If HgbA1C is 8 or greater, it needs to be on file within the past 3 months.  If less than 8, must be on file within the past 6 months.     Recent Labs   Lab Test 01/17/22  1038   A1C 8.8*             Passed - Medication is active on med list        Passed - Patient is age 18 or older        Passed - Patient has a recent creatinine (normal) within the past 12 mos.     Recent Labs   Lab Test 01/17/22  1038   CR 0.96       Ok to refill medication if creatinine is low          Passed - Recent (6 mo) or future (30 days) visit within the authorizing provider's specialty     Patient had office visit in the last 6 months or has a visit in the next 30 days with authorizing provider or within the authorizing provider's specialty.  See \"Patient Info\" tab in [employer], or \"Choose Columns\" in Meds & Orders section of the refill encounter.               lisinopril-hydrochlorothiazide (ZESTORETIC) 20-25 MG tablet [Pharmacy Med Name: LISINOPRIL-HCTZ 20-25 MG TAB] 90 tablet 0     Sig: TAKE 1 TABLET BY MOUTH EVERY DAY       Diuretics (Including Combos) Protocol Passed - 1/30/2022 11:58 AM        Passed - Blood pressure under 140/90 in past 12 months     BP Readings from Last 3 Encounters:   01/17/22 104/63   08/13/21 122/70   07/22/19 118/58                 Passed - Recent (12 mo) or future (30 days) visit within " "the authorizing provider's specialty     Patient has had an office visit with the authorizing provider or a provider within the authorizing providers department within the previous 12 mos or has a future within next 30 days. See \"Patient Info\" tab in inbasket, or \"Choose Columns\" in Meds & Orders section of the refill encounter.              Passed - Medication is active on med list        Passed - Patient is age 18 or older        Passed - Normal serum creatinine on file in past 12 months     Recent Labs   Lab Test 01/17/22  1038   CR 0.96              Passed - Normal serum potassium on file in past 12 months     Recent Labs   Lab Test 01/17/22  1038   POTASSIUM 3.7                    Passed - Normal serum sodium on file in past 12 months     Recent Labs   Lab Test 01/17/22  1038                ACE Inhibitors (Including Combos) Protocol Passed - 1/30/2022 11:58 AM        Passed - Blood pressure under 140/90 in past 12 months     BP Readings from Last 3 Encounters:   01/17/22 104/63   08/13/21 122/70   07/22/19 118/58                 Passed - Recent (12 mo) or future (30 days) visit within the authorizing provider's specialty     Patient has had an office visit with the authorizing provider or a provider within the authorizing providers department within the previous 12 mos or has a future within next 30 days. See \"Patient Info\" tab in inbasket, or \"Choose Columns\" in Meds & Orders section of the refill encounter.              Passed - Medication is active on med list        Passed - Patient is age 18 or older        Passed - Normal serum creatinine on file in past 12 months     Recent Labs   Lab Test 01/17/22  1038   CR 0.96       Ok to refill medication if creatinine is low          Passed - Normal serum potassium on file in past 12 months     Recent Labs   Lab Test 01/17/22  1038   POTASSIUM 3.7                rosuvastatin (CRESTOR) 20 MG tablet [Pharmacy Med Name: ROSUVASTATIN CALCIUM 20 MG TAB] 90 tablet " "0     Sig: TAKE 1 TABLET BY MOUTH EVERY DAY       Statins Protocol Passed - 1/30/2022 11:58 AM        Passed - LDL on file in past 12 months     Recent Labs   Lab Test 01/17/22  1038   LDL 8             Passed - No abnormal creatine kinase in past 12 months     No lab results found.             Passed - Recent (12 mo) or future (30 days) visit within the authorizing provider's specialty     Patient has had an office visit with the authorizing provider or a provider within the authorizing providers department within the previous 12 mos or has a future within next 30 days. See \"Patient Info\" tab in inbasket, or \"Choose Columns\" in Meds & Orders section of the refill encounter.              Passed - Medication is active on med list        Passed - Patient is age 18 or older           Deirdre Lopez RN on 2/1/2022 at 10:39 AM    "

## 2022-02-02 DIAGNOSIS — E11.65 TYPE 2 DIABETES MELLITUS WITH HYPERGLYCEMIA, WITHOUT LONG-TERM CURRENT USE OF INSULIN (H): ICD-10-CM

## 2022-02-02 NOTE — TELEPHONE ENCOUNTER
Diabetic machine      Last Written Prescription Date:    Last Fill Quantity: ,   # refills:   Last Office Visit:   Future Office visit:       Routing refill request to provider for review/approval because:  Drug not active on patient's medication list

## 2022-02-04 RX ORDER — LANCETS
EACH MISCELLANEOUS
Qty: 100 EACH | Refills: 6 | Status: SHIPPED | OUTPATIENT
Start: 2022-02-04

## 2022-02-04 NOTE — TELEPHONE ENCOUNTER
"blood glucose monitoring (NO BRAND SPECIFIED) meter device kit    Prescription approved per [facility] Refill Protocol.    Requested Prescriptions   Pending Prescriptions Disp Refills     blood glucose (NO BRAND SPECIFIED) test strip 100 strip 6     Sig: Use to test blood sugar 3 times daily or as directed. To accompany: Blood Glucose Monitor Brands: per insurance.       Diabetic Supplies Protocol Passed - 2/2/2022  4:37 PM        Passed - Medication is active on med list        Passed - Patient is 18 years of age or older        Passed - Recent (6 mo) or future (30 days) visit within the authorizing provider's specialty     Patient had office visit in the last 6 months or has a visit in the next 30 days with authorizing provider.  See \"Patient Info\" tab in inbasket, or \"Choose Columns\" in Meds & Orders section of the refill encounter.               thin (NO BRAND SPECIFIED) lancets 100 each 6     Sig: Use with lanceting device. To accompany: Blood Glucose Monitor Brands: per insurance.       Diabetic Supplies Protocol Passed - 2/2/2022  4:37 PM        Passed - Medication is active on med list        Passed - Patient is 18 years of age or older        Passed - Recent (6 mo) or future (30 days) visit within the authorizing provider's specialty     Patient had office visit in the last 6 months or has a visit in the next 30 days with authorizing provider.  See \"Patient Info\" tab in inbasket, or \"Choose Columns\" in Meds & Orders section of the refill encounter.                   Deirdre BROUSSARD MSN, BSN, RN on 2/4/2022 at 11:57 AM    "

## 2022-05-01 DIAGNOSIS — E11.8 TYPE 2 DIABETES MELLITUS WITH COMPLICATION, WITHOUT LONG-TERM CURRENT USE OF INSULIN (H): ICD-10-CM

## 2022-05-01 DIAGNOSIS — E11.65 TYPE 2 DIABETES MELLITUS WITH HYPERGLYCEMIA, WITHOUT LONG-TERM CURRENT USE OF INSULIN (H): ICD-10-CM

## 2022-05-03 RX ORDER — GLIMEPIRIDE 4 MG/1
8 TABLET ORAL
Qty: 30 TABLET | Refills: 0 | Status: SHIPPED | OUTPATIENT
Start: 2022-05-03 | End: 2022-05-17

## 2022-05-03 RX ORDER — REPAGLINIDE 1 MG/1
TABLET ORAL
Qty: 90 TABLET | Refills: 0 | Status: SHIPPED | OUTPATIENT
Start: 2022-05-03 | End: 2022-08-15

## 2022-05-03 NOTE — TELEPHONE ENCOUNTER
"Routing refill request to provider for review/approval because:  Stacey given x1 and patient did not follow up, please advise.     Requested Prescriptions   Pending Prescriptions Disp Refills    repaglinide (PRANDIN) 1 MG tablet [Pharmacy Med Name: REPAGLINIDE 1 MG TABLET] 180 tablet 0     Sig: TAKE 1 TABLET (1 MG) BY MOUTH 3 TIMES DAILY BEFORE MEALS *NEEDS FOLLOW UP FOR FURTHER REFILLS*        Meglitinide Agents Failed - 5/1/2022 11:39 AM        Failed - Patient has a recent ALT within the past 12 mos.     Recent Labs   Lab Test 12/28/18  1113   ALT 42               Failed - HgbA1C in past 3 or 6 months     If HgbA1C is 8 or greater, it needs to be on file within the past 3 months.  If less than 8, must be on file within the past 6 months.     Recent Labs   Lab Test 01/17/22  1038   A1C 8.8*             Failed - Patient has a recent AST within the past 12 mos.        Recent Labs   Lab Test 12/28/18  1113   AST 21             Passed - Patient does not have a diagnosis of CHF        Passed - Medication is active on med list        Passed - Patient is age 18 or older        Passed - Recent (6 mo) or future (30 days) visit within the authorizing provider's specialty     Patient had office visit in the last 6 months or has a visit in the next 30 days with authorizing provider or within the authorizing provider's specialty.  See \"Patient Info\" tab in inbasket, or \"Choose Columns\" in Meds & Orders section of the refill encounter.               metFORMIN (GLUCOPHAGE) 1000 MG tablet [Pharmacy Med Name: METFORMIN HCL 1,000 MG TABLET] 180 tablet 0     Sig: TAKE 1 TABLET BY MOUTH TWICE A DAY WITH MEALS        Biguanide Agents Failed - 5/1/2022 11:39 AM        Failed - Patient has documented A1c within the specified period of time.     If HgbA1C is 8 or greater, it needs to be on file within the past 3 months.  If less than 8, must be on file within the past 6 months.     Recent Labs   Lab Test 01/17/22  1038   A1C 8.8*         " "    Passed - Patient is age 10 or older        Passed - Patient's CR is NOT>1.4 OR Patient's EGFR is NOT<45 within past 12 mos.       Recent Labs   Lab Test 01/17/22  1038 12/24/20  0953   GFRESTIMATED >90 74   GFRESTBLACK  --  86       Recent Labs   Lab Test 01/17/22  1038   CR 0.96             Passed - Patient does NOT have a diagnosis of CHF.        Passed - Medication is active on med list        Passed - Recent (6 mo) or future (30 days) visit within the authorizing provider's specialty     Patient had office visit in the last 6 months or has a visit in the next 30 days with authorizing provider or within the authorizing provider's specialty.  See \"Patient Info\" tab in inbasket, or \"Choose Columns\" in Meds & Orders section of the refill encounter.               glimepiride (AMARYL) 4 MG tablet [Pharmacy Med Name: GLIMEPIRIDE 4 MG TABLET] 180 tablet 0     Sig: TAKE 2 TABLETS (8 MG) BY MOUTH EVERY MORNING (BEFORE BREAKFAST)        Sulfonylurea Agents Failed - 5/1/2022 11:39 AM        Failed - Patient has documented A1c within the specified period of time.     If HgbA1C is 8 or greater, it needs to be on file within the past 3 months.  If less than 8, must be on file within the past 6 months.     Recent Labs   Lab Test 01/17/22  1038   A1C 8.8*             Passed - Medication is active on med list        Passed - Patient is age 18 or older        Passed - Patient has a recent creatinine (normal) within the past 12 mos.     Recent Labs   Lab Test 01/17/22  1038   CR 0.96       Ok to refill medication if creatinine is low          Passed - Recent (6 mo) or future (30 days) visit within the authorizing provider's specialty     Patient had office visit in the last 6 months or has a visit in the next 30 days with authorizing provider or within the authorizing provider's specialty.  See \"Patient Info\" tab in inbasket, or \"Choose Columns\" in Meds & Orders section of the refill encounter.                  Kassie Oliveira, " RN   [facility]

## 2022-05-04 NOTE — TELEPHONE ENCOUNTER
Team, please assist patient with scheduling follow up for further refills. See provider note below.     Vy CHOU RN  [facility]

## 2022-05-06 NOTE — TELEPHONE ENCOUNTER
Called patient to schedule. No answer. If patient calls back please assist in scheduling     Pavel-

## 2022-05-10 NOTE — TELEPHONE ENCOUNTER
Spoke to patient, patient states will call back and set up appointment with provider in the next to week.    Phyllis Herman. MA

## 2022-05-13 DIAGNOSIS — E11.65 TYPE 2 DIABETES MELLITUS WITH HYPERGLYCEMIA, WITHOUT LONG-TERM CURRENT USE OF INSULIN (H): ICD-10-CM

## 2022-05-15 NOTE — TELEPHONE ENCOUNTER
"Per 5/10/22 note: Spoke to patient, patient states will call back and set up appointment with provider in the next to week.    Routing refill request to provider for review/approval because:  Stacey given x1 and patient did not follow up, please advise  Labs not current:  A1C      Requested Prescriptions   Pending Prescriptions Disp Refills     glimepiride (AMARYL) 4 MG tablet [Pharmacy Med Name: GLIMEPIRIDE 4 MG TABLET] 30 tablet 0     Sig: TAKE 2 TABLETS (8 MG) BY MOUTH EVERY MORNING (BEFORE BREAKFAST)       Sulfonylurea Agents Failed - 5/13/2022  8:35 AM        Failed - Patient has documented A1c within the specified period of time.     If HgbA1C is 8 or greater, it needs to be on file within the past 3 months.  If less than 8, must be on file within the past 6 months.     Recent Labs   Lab Test 01/17/22  1038   A1C 8.8*             Passed - Medication is active on med list        Passed - Patient is age 18 or older        Passed - Patient has a recent creatinine (normal) within the past 12 mos.     Recent Labs   Lab Test 01/17/22  1038   CR 0.96       Ok to refill medication if creatinine is low          Passed - Recent (6 mo) or future (30 days) visit within the authorizing provider's specialty     Patient had office visit in the last 6 months or has a visit in the next 30 days with authorizing provider or within the authorizing provider's specialty.  See \"Patient Info\" tab in inbasket, or \"Choose Columns\" in Meds & Orders section of the refill encounter.               Bruna Shelton RN    "

## 2022-05-16 RX ORDER — GLIMEPIRIDE 4 MG/1
8 TABLET ORAL
Qty: 60 TABLET | Refills: 0 | OUTPATIENT
Start: 2022-05-16

## 2022-05-17 RX ORDER — GLIMEPIRIDE 4 MG/1
8 TABLET ORAL
Qty: 30 TABLET | Refills: 0 | Status: SHIPPED | OUTPATIENT
Start: 2022-05-17 | End: 2022-05-31

## 2022-05-17 NOTE — TELEPHONE ENCOUNTER
Prescription approved per [facility] Refill Protocol. Patient has appointment with Dr. Bhagat scheduled for 5/23/22.     Kassie Oliveira RN   [facility]

## 2022-05-31 DIAGNOSIS — E11.65 TYPE 2 DIABETES MELLITUS WITH HYPERGLYCEMIA, WITHOUT LONG-TERM CURRENT USE OF INSULIN (H): ICD-10-CM

## 2022-05-31 RX ORDER — GLIMEPIRIDE 4 MG/1
8 TABLET ORAL
Qty: 60 TABLET | Refills: 0 | Status: SHIPPED | OUTPATIENT
Start: 2022-05-31 | End: 2022-06-29

## 2022-06-27 DIAGNOSIS — E11.65 TYPE 2 DIABETES MELLITUS WITH HYPERGLYCEMIA, WITHOUT LONG-TERM CURRENT USE OF INSULIN (H): ICD-10-CM

## 2022-06-29 RX ORDER — GLIMEPIRIDE 4 MG/1
8 TABLET ORAL
Qty: 60 TABLET | Refills: 0 | Status: SHIPPED | OUTPATIENT
Start: 2022-06-29 | End: 2022-07-25

## 2022-06-29 NOTE — TELEPHONE ENCOUNTER
Prescription approved per [facility] Refill Protocol.    Patient due for visit and is scheduled on 7/12/22.   Bruna Shelton RN

## 2022-07-11 ENCOUNTER — TELEPHONE (OUTPATIENT)
Dept: FAMILY MEDICINE | Facility: CLINIC | Age: 57
End: 2022-07-11

## 2022-07-11 NOTE — TELEPHONE ENCOUNTER
"Luther Mariee \"Hector\" - FW: Appointment    FW: Appointment   Cheryl Lua   Sent:   2:05 PM   To: SUSANNE Canas Team Red    Luther Mariee   MRN: [id number] : 1965   Pt Home: [phone]          Message       ----- Message -----   From: Luther Mariee   Sent: 2022   9:47 AM CDT   To: Missael Reception   Subject: Appointment                                        Topic: Non-Medical Question.      I am so sorry.  I had to change my appointment once again to .  I have too many staff out tomorrow morning and starting new summer transportation.  (I am the  for several school districts.)  I am sure  will be fine because most summer transportation will have wrapped up by then.  Please do no withhold any medications!  I will be in!  You can check my feet!  I will do labs and eye check then as well.  Hope all is well!       "

## 2022-07-25 DIAGNOSIS — E11.65 TYPE 2 DIABETES MELLITUS WITH HYPERGLYCEMIA, WITHOUT LONG-TERM CURRENT USE OF INSULIN (H): ICD-10-CM

## 2022-07-25 RX ORDER — GLIMEPIRIDE 4 MG/1
8 TABLET ORAL
Qty: 60 TABLET | Refills: 0 | Status: SHIPPED | OUTPATIENT
Start: 2022-07-25 | End: 2022-08-15

## 2022-08-01 ENCOUNTER — OFFICE VISIT (OUTPATIENT)
Dept: FAMILY MEDICINE | Facility: CLINIC | Age: 57
End: 2022-08-01
Payer: COMMERCIAL

## 2022-08-01 VITALS
TEMPERATURE: 97.5 F | SYSTOLIC BLOOD PRESSURE: 112 MMHG | WEIGHT: 203.4 LBS | DIASTOLIC BLOOD PRESSURE: 68 MMHG | HEIGHT: 67 IN | OXYGEN SATURATION: 95 % | HEART RATE: 60 BPM | RESPIRATION RATE: 19 BRPM | BODY MASS INDEX: 31.92 KG/M2

## 2022-08-01 DIAGNOSIS — E11.65 TYPE 2 DIABETES MELLITUS WITH HYPERGLYCEMIA, WITHOUT LONG-TERM CURRENT USE OF INSULIN (H): Primary | ICD-10-CM

## 2022-08-01 DIAGNOSIS — E78.5 HYPERLIPIDEMIA LDL GOAL <100: ICD-10-CM

## 2022-08-01 DIAGNOSIS — Z12.11 SCREEN FOR COLON CANCER: ICD-10-CM

## 2022-08-01 DIAGNOSIS — L84 CORN OR CALLUS: ICD-10-CM

## 2022-08-01 DIAGNOSIS — R97.20 ELEVATED PROSTATE SPECIFIC ANTIGEN (PSA): ICD-10-CM

## 2022-08-01 DIAGNOSIS — I10 HTN, GOAL BELOW 140/90: ICD-10-CM

## 2022-08-01 DIAGNOSIS — Q66.72 CAVUS DEFORMITY OF LEFT FOOT: ICD-10-CM

## 2022-08-01 LAB
CHOLEST SERPL-MCNC: 107 MG/DL
CREAT UR-MCNC: 218 MG/DL
FASTING STATUS PATIENT QL REPORTED: YES
HBA1C MFR BLD: 10.3 % (ref 0–5.6)
HDLC SERPL-MCNC: 38 MG/DL
LDLC SERPL CALC-MCNC: <5 MG/DL
MICROALBUMIN UR-MCNC: 628 MG/L
MICROALBUMIN/CREAT UR: 288.07 MG/G CR (ref 0–17)
NONHDLC SERPL-MCNC: 69 MG/DL
TRIGL SERPL-MCNC: 369 MG/DL

## 2022-08-01 PROCEDURE — 99214 OFFICE O/P EST MOD 30 MIN: CPT | Performed by: FAMILY MEDICINE

## 2022-08-01 PROCEDURE — 83036 HEMOGLOBIN GLYCOSYLATED A1C: CPT | Performed by: FAMILY MEDICINE

## 2022-08-01 PROCEDURE — 36415 COLL VENOUS BLD VENIPUNCTURE: CPT | Performed by: FAMILY MEDICINE

## 2022-08-01 PROCEDURE — 80061 LIPID PANEL: CPT | Performed by: FAMILY MEDICINE

## 2022-08-01 PROCEDURE — 82043 UR ALBUMIN QUANTITATIVE: CPT | Performed by: FAMILY MEDICINE

## 2022-08-01 RX ORDER — GLUCOSAMINE HCL/CHONDROITIN SU 500-400 MG
CAPSULE ORAL
Qty: 100 EACH | Refills: 3 | Status: SHIPPED | OUTPATIENT
Start: 2022-08-01

## 2022-08-01 RX ORDER — LANCETS
EACH MISCELLANEOUS
Qty: 100 EACH | Refills: 6 | Status: SHIPPED | OUTPATIENT
Start: 2022-08-01

## 2022-08-01 ASSESSMENT — PAIN SCALES - GENERAL: PAINLEVEL: NO PAIN (0)

## 2022-08-01 NOTE — PROGRESS NOTES
Assessment & Plan     Type 2 diabetes mellitus with hyperglycemia, without long-term current use of insulin (H)   A1c worsened from last time.  Been checking his blood sugars because meter not working.  New supplies given  Recommendations: Diabetes educator to discuss healthier diet, as well as consider initiation of insulin.  In the past tried orals but they are not covered by his insurance.  - OPTOMETRY REFERRAL; Future  - Albumin Random Urine Quantitative with Creat Ratio; Future  - HEMOGLOBIN A1C; Future  - blood glucose monitoring (NO BRAND SPECIFIED) meter device kit; Use to test blood sugar once times daily or as directed. Preferred blood glucose meter OR supplies to accompany: Blood Glucose Monitor Brands: per insurance.  - blood glucose (NO BRAND SPECIFIED) test strip; Use to test blood sugar one times daily or as directed. To accompany: Blood Glucose Monitor Brands: per insurance.  - blood glucose calibration (NO BRAND SPECIFIED) solution; To accompany: Blood Glucose Monitor Brands: per insurance.  - thin (NO BRAND SPECIFIED) lancets; Use with lanceting device. To accompany: Blood Glucose Monitor Brands: per insurance.  - alcohol swab prep pads; Use to swab area of injection/kaylie as directed.  - Albumin Random Urine Quantitative with Creat Ratio  - HEMOGLOBIN A1C    Hyperlipidemia LDL goal <100    - on Crestor 20 mg, patient states that taking regularly.  Cholesterol is good but triglycerides are still high, most likely from uncontrolled diabetes which could be diet related.        HTN, goal below 140/90     Blood pressure is at goal    Corn or callus  The corn is bothersome, discussed seeing podiatry for further evaluation  - Orthopedic  Referral; Future    Elevated prostate specific antigen (PSA)  PSA been persistently high, given referral to urology but did not follow-up  - Adult Urology  Referral; Future    Screen for colon cancer  - GERI(EXACT SCIENCES)    Cavus deformity of  "left foot  - Orthopedic  Referral; Future    BMI 31.0-31.9,adult    Estimated body mass index is 31.92 kg/m  as calculated from the following:    Height as of this encounter: 1.7 m (5' 6.93\").    Weight as of this encounter: 92.3 kg (203 lb 6.4 oz).   Weight management plan: Discussed healthy diet and exercise guidelines      Return in about 3 months (around 11/1/2022) for Routine Visit.    Watson Bhagat MD  [facility] Young is a 55 yo M with htn, hx of AD, hx sbo (ischemic), DM, obesity, AR    Subjective   Hector is a 56 year old, presenting for the following health issues:  Diabetes and Recheck Medication      History of Present Illness       Diabetes:   He presents for follow up of diabetes.  He is checking home blood glucose a few times a month. He checks blood glucose before meals.  Blood glucose is sometimes over 200 and never under 70. When his blood glucose is low, the patient is asymptomatic for confusion, blurred vision, lethargy and reports not feeling dizzy, shaky, or weak.  He is concerned about blood sugar frequently over 200.  He is having numbness in feet. The patient has not had a diabetic eye exam in the last 12 months.         He eats 2-3 servings of fruits and vegetables daily.He consumes 0 sweetened beverage(s) daily.He exercises with enough effort to increase his heart rate 20 to 29 minutes per day.  He exercises with enough effort to increase his heart rate 5 days per week.   He is taking medications regularly.       Diabetes Follow-up  His meter is not working, so we will be getting blood sugars.  We will supply a new meter and supplies  How often are you checking your blood sugar? A few times a month  What time of day are you checking your blood sugars (select all that apply)?  N/A  Have you had any blood sugars above 200?  No  Have you had any blood sugars below 70?  No    What symptoms do you notice when your blood sugar is low?  None    What concerns " "do you have today about your diabetes? None and Other: Glucose meter not working     Do you have any of these symptoms? (Select all that apply)  No numbness or tingling in feet.  No redness, sores or blisters on feet.  No complaints of excessive thirst.  No reports of blurry vision.  No significant changes to weight.    Have you had a diabetic eye exam in the last 12 months? No      Hyperlipidemia Follow-Up      Are you regularly taking any medication or supplement to lower your cholesterol?   Yes- Crestor    Are you having muscle aches or other side effects that you think could be caused by your cholesterol lowering medication?  No    Hypertension Follow-up      Do you check your blood pressure regularly outside of the clinic? No     Are you following a low salt diet? Yes    Are your blood pressures ever more than 140 on the top number (systolic) OR more   than 90 on the bottom number (diastolic), for example 140/90? No    BP Readings from Last 2 Encounters:   08/01/22 112/68   01/17/22 104/63     Hemoglobin A1C POCT (%)   Date Value   06/03/2021 10.1 (H)   12/24/2020 9.5 (H)     Hemoglobin A1C (%)   Date Value   08/01/2022 10.3 (H)   01/17/2022 8.8 (H)     LDL Cholesterol Calculated (mg/dL)   Date Value   01/17/2022 8   06/03/2021     Cannot estimate LDL when triglyceride exceeds 400 mg/dL   12/24/2020     Cannot estimate LDL when triglyceride exceeds 400 mg/dL     LDL Cholesterol Direct (mg/dL)   Date Value   06/03/2021 70   12/24/2020 48     Cavus foot with callus:   Callus: Left ball of foot laterally; bothering him,   Might need trimming and orthotics    Elevated PSA:   Will follow with urologist; is due to schedule appointment;   Review of Systems   Constitutional, HEENT, cardiovascular, pulmonary, gi and gu systems are negative, except as otherwise noted.      Objective    /68 (BP Location: Left arm)   Pulse 60   Temp 97.5  F (36.4  C) (Oral)   Resp 19   Ht 1.7 m (5' 6.93\")   Wt 92.3 kg (203 lb " 6.4 oz)   SpO2 95%   BMI 31.92 kg/m    Body mass index is 31.92 kg/m .  Physical Exam   GENERAL: healthy, alert and no distress  NECK: no adenopathy and thyroid normal to palpation  RESP: lungs clear to auscultation - no rales, rhonchi or wheezes  CV: regular rate and rhythm, normal S1 S2, no S3 or S4, no murmur, click or rub, no peripheral edema   ABDOMEN: soft, nontender, no hepatosplenomegaly, no masses and bowel sounds normal  MS: no gross musculoskeletal defects noted, no edema  Foot Exam: Callus ball of foot; base 5th toe    Results for orders placed or performed in visit on 08/01/22   HEMOGLOBIN A1C     Status: Abnormal   Result Value Ref Range    Hemoglobin A1C 10.3 (H) 0.0 - 5.6 %

## 2022-08-03 ENCOUNTER — TELEPHONE (OUTPATIENT)
Dept: FAMILY MEDICINE | Facility: CLINIC | Age: 57
End: 2022-08-03

## 2022-08-03 NOTE — TELEPHONE ENCOUNTER
Diabetes Education Scheduling Outreach #1:    Call to patient to schedule. Patient declined to schedule and wants to discuss his results with his pcp first.    Deirdre Christianson  [facility]  Diabetes and Nutrition Scheduling

## 2022-08-14 DIAGNOSIS — E11.65 TYPE 2 DIABETES MELLITUS WITH HYPERGLYCEMIA, WITHOUT LONG-TERM CURRENT USE OF INSULIN (H): ICD-10-CM

## 2022-08-14 DIAGNOSIS — E11.8 TYPE 2 DIABETES MELLITUS WITH COMPLICATION, WITHOUT LONG-TERM CURRENT USE OF INSULIN (H): ICD-10-CM

## 2022-08-15 RX ORDER — GLIMEPIRIDE 4 MG/1
8 TABLET ORAL
Qty: 180 TABLET | Refills: 0 | Status: SHIPPED | OUTPATIENT
Start: 2022-08-15 | End: 2022-11-11

## 2022-08-15 RX ORDER — REPAGLINIDE 1 MG/1
1 TABLET ORAL
Qty: 90 TABLET | Refills: 0 | Status: SHIPPED | OUTPATIENT
Start: 2022-08-15 | End: 2022-09-13

## 2022-08-15 NOTE — TELEPHONE ENCOUNTER
Prescription approved per [facility] Refill Protocol     Kelly Howard, RN, BSN, PHN  [facility]: [place]

## 2022-08-15 NOTE — TELEPHONE ENCOUNTER
Routing refill request to provider for review/approval because:  Labs not current:    Lab Results   Component Value Date    ALT 42 12/28/2018     AST   Date Value Ref Range Status   12/28/2018 21 0 - 45 U/L Final     Kelly Howard, RN, BSN, PHN  [facility]: [place]

## 2022-09-12 DIAGNOSIS — E11.8 TYPE 2 DIABETES MELLITUS WITH COMPLICATION, WITHOUT LONG-TERM CURRENT USE OF INSULIN (H): ICD-10-CM

## 2022-09-13 RX ORDER — REPAGLINIDE 1 MG/1
1 TABLET ORAL
Qty: 90 TABLET | Refills: 2 | Status: SHIPPED | OUTPATIENT
Start: 2022-09-13 | End: 2023-06-05

## 2022-09-20 ENCOUNTER — TELEPHONE (OUTPATIENT)
Dept: FAMILY MEDICINE | Facility: CLINIC | Age: 57
End: 2022-09-20

## 2022-09-20 DIAGNOSIS — R19.5 POSITIVE COLORECTAL CANCER SCREENING USING COLOGUARD TEST: ICD-10-CM

## 2022-09-20 DIAGNOSIS — Z12.11 COLON CANCER SCREENING: Primary | ICD-10-CM

## 2022-09-20 NOTE — TELEPHONE ENCOUNTER
Patient Quality Outreach    Patient is due for the following:   Colon Cancer Screening  Physical Preventive Adult Physical    Next Steps:   Schedule a Annual Wellness Visit    Type of outreach:    Sent letter.      Questions for provider review:    None     JACKIE ADORNO, CMA

## 2022-09-20 NOTE — LETTER
September 20, 2022      Luther Mariee  [address] [address]      Your healthcare team cares about your health. To provide you with the best care,   we have reviewed your chart and based on our findings, we see that you are due to:     - COLON CANCER SCREENING:  Call or mychart the clinic to schedule your colonoscopy or schedule/ your FIT Test, or Cologuard test  - ANNUAL WELLNESS FOLLOW UP:   Schedule an Annual Medicare Wellness Exam. This can be done by in person visit or virtual video visit.     If you have already completed these items, please contact the clinic via phone or   Mychart so your care team can review and update your records. Thank you for   choosing [facility] for your healthcare needs. For any questions,   concerns, or to schedule an appointment please contact the clinic.       Healthy Regards,      Your [facility] Care Team

## 2022-11-11 DIAGNOSIS — E11.65 TYPE 2 DIABETES MELLITUS WITH HYPERGLYCEMIA, WITHOUT LONG-TERM CURRENT USE OF INSULIN (H): ICD-10-CM

## 2022-11-11 RX ORDER — GLIMEPIRIDE 4 MG/1
8 TABLET ORAL
Qty: 180 TABLET | Refills: 0 | Status: SHIPPED | OUTPATIENT
Start: 2022-11-11 | End: 2023-02-10

## 2022-11-24 LAB — NONINV COLON CA DNA+OCC BLD SCRN STL QL: POSITIVE

## 2022-12-19 ENCOUNTER — HOSPITAL ENCOUNTER (OUTPATIENT)
Facility: AMBULATORY SURGERY CENTER | Age: 57
End: 2022-12-19
Attending: INTERNAL MEDICINE
Payer: COMMERCIAL

## 2022-12-19 ENCOUNTER — TELEPHONE (OUTPATIENT)
Dept: GASTROENTEROLOGY | Facility: CLINIC | Age: 57
End: 2022-12-19

## 2022-12-19 NOTE — TELEPHONE ENCOUNTER
Screening Questions  BLUE  KIND OF PREP RED  LOCATION [review exclusion criteria] GREEN  SEDATION TYPE        Y Are you active on mychart?       Watson Bhagat MD Ordering/Referring Provider?        [facility] What type of coverage do you have?      N Have you had a positive covid test in the last 14 days?     28.9 1. BMI  [BMI 40+ - review exclusion criteria]    Y  2. Are you able to give consent for your medical care? [IF NO,RN REVIEW]        N  3. Are you taking any prescription pain medications on a routine schedule?        3a. EXTENDED PREP What kind of prescription?     N 4. Do you have any chemical dependencies such as alcohol, street drugs, or methadone?    N 5. Do you have any history of post-traumatic stress syndrome, severe anxiety or history of psychosis?      **If yes 3- 5 , please schedule with MAC sedation.**          IF YES TO ANY 6 - 10 - HOSPITAL SETTING ONLY.     N 6.   Do you need assistance transferring?     N 7.   Have you had a heart or lung transplant?    N 8.   Are you currently on dialysis?   N 9.   Do you use daily home oxygen?   N 10. Do you take nitroglycerin?   10a.  If yes, how often?     11. [FEMALES]   Are you currently pregnant?    11a.  If yes, how many weeks? [ Greater than 12 weeks, OR NEEDED]    N 12. Do you have Pulmonary Hypertension? *NEED PAC APPT AT [facility]*     N 13. [review exclusion criteria]  Do you have any implantable devices in your body (pacemaker, defib, LVAD)?    N 14. In the past 6 months, have you had any heart related issues including cardiomyopathy or heart attack?     14a.  If yes, did it require cardiac stenting if so when?     N 15. Have you had a stroke or Transient ischemic attack (TIA - aka  mini stroke ) within 6 months?      N 16. Do you have mod to severe Obstructive Sleep Apnea?  [Hospital only - Ok at [facility]]    N 17. Do you have SEVERE AND UNCONTROLLED asthma? *NEED PAC APPT AT [facility]*     N 18. Are you currently taking any blood  "thinners?     18a. If yes, inform patient to \"follow up w/ ordering provider for bridging instructions.\"    N 19. Do you take the medication Phentermine?    19a. If yes, \"Hold for 7 days before procedure.  Please consult your prescribing provider if you have questions about holding this medication.\"     N  20. Do you have chronic kidney disease?      N  21. Do you have a diagnosis of diabetes?     N  22. On a regular basis do you go 3-5 days between bowel movements?      23. Preferred LOCAL Pharmacy for Pre Prescription    [ LIST ONLY ONE PHARMACY]     [facility] [id number] [place] [address]        - CLOSING REMINDERS -    Informed patient they will need an adult    Cannot take any type of public or medical transportation alone    Conscious Sedation- Needs  for 6 hours after the procedure       MAC/General-Needs  for 24 hours after procedure    Pre-Procedure Covid test to be completed [[facility] PCR Testing Required]    Confirmed Nurse will call to complete assessment       - SCHEDULING DETAILS -  no Hospital Setting Required? If yes, what is the exclusion?: n/a   Michelle  Surgeon    02/09/23  Date of Procedure  Lower Endoscopy [Colonoscopy]  [facility]ARD GOLYTELY-If you answer yes to questions #8, #20, #21Which Colonoscopy Prep was Sent?     Moderate Sedation Type     N PAC / Pre-op Required                 "

## 2023-01-22 ENCOUNTER — TELEPHONE (OUTPATIENT)
Dept: FAMILY MEDICINE | Facility: CLINIC | Age: 58
End: 2023-01-22
Payer: COMMERCIAL

## 2023-01-22 NOTE — TELEPHONE ENCOUNTER
Reason for Call:  Other call back    Detailed comments: Patient would like to know what his blood type is. Patient spoke with a triage nurse and patient stated that she couldn't tell from his chart what his blood type was. Patient would like a call back.     Phone Number Patient can be reached at: Home number on file [phone] (home)    Best Time: Any    Can we leave a detailed message on this number? YES    Call taken on 1/22/2023 at 9:28 AM by Kylee Cleary

## 2023-01-23 NOTE — TELEPHONE ENCOUNTER
Called patient back and relayed that generally blood typing is not performed routinely as it is not usually covered by insurance. Patient stated that he did have blood transfusions done in 2010, writer completed chart review but could not find results.    Patient stated that he was just curious and will likely order an at-home test for it from [employer], no needed follow-up. Writer stated that we could route to provider to see if an order could be placed but patient declined.    JOSE GomezN RN  [facility]

## 2023-01-30 ENCOUNTER — TELEPHONE (OUTPATIENT)
Dept: GASTROENTEROLOGY | Facility: CLINIC | Age: 58
End: 2023-01-30

## 2023-01-30 DIAGNOSIS — R19.5 POSITIVE COLORECTAL CANCER SCREENING USING COLOGUARD TEST: Primary | ICD-10-CM

## 2023-01-30 NOTE — TELEPHONE ENCOUNTER
Patient scheduled for Colonoscopy  on 02.09.2023.     Message sent to anesthesia for approval of last Hgb A1C of 10.3.    Discuss Covid policy.     Pre op exam scheduled: N/A    Arrival time: 1205. Procedure time 1250    Facility location: [facility]; [address], [place] [address]    Sedation type: Conscious sedation     Anticoagulations? No    Electronic implanted devices? No    Diabetic? Yes - Patient to hold oral diabetic medications day of procedure    Indication for procedure: screening colonoscopy    Bowel prep recommendation: Standard Golytely     Prep instructions need to be  sent via [employer] Bowel prep script needs to be sent to [facility] [id number] [place] [facility] [address]    Pre visit planning completed.    Arline Denise RN  Endoscopy Procedure Pre Assessment RN

## 2023-01-31 ENCOUNTER — TELEPHONE (OUTPATIENT)
Dept: GASTROENTEROLOGY | Facility: CLINIC | Age: 58
End: 2023-01-31
Payer: COMMERCIAL

## 2023-01-31 RX ORDER — ONDANSETRON 2 MG/ML
4 INJECTION INTRAMUSCULAR; INTRAVENOUS EVERY 6 HOURS PRN
Status: CANCELLED | OUTPATIENT
Start: 2023-01-31

## 2023-01-31 RX ORDER — NALOXONE HYDROCHLORIDE 0.4 MG/ML
0.2 INJECTION, SOLUTION INTRAMUSCULAR; INTRAVENOUS; SUBCUTANEOUS
Status: CANCELLED | OUTPATIENT
Start: 2023-01-31

## 2023-01-31 RX ORDER — FLUMAZENIL 0.1 MG/ML
0.2 INJECTION, SOLUTION INTRAVENOUS
Status: CANCELLED | OUTPATIENT
Start: 2023-01-31 | End: 2023-02-01

## 2023-01-31 RX ORDER — PROCHLORPERAZINE MALEATE 10 MG
10 TABLET ORAL EVERY 6 HOURS PRN
Status: CANCELLED | OUTPATIENT
Start: 2023-01-31

## 2023-01-31 RX ORDER — LIDOCAINE 40 MG/G
CREAM TOPICAL
Status: CANCELLED | OUTPATIENT
Start: 2023-01-31

## 2023-01-31 RX ORDER — NALOXONE HYDROCHLORIDE 0.4 MG/ML
0.4 INJECTION, SOLUTION INTRAMUSCULAR; INTRAVENOUS; SUBCUTANEOUS
Status: CANCELLED | OUTPATIENT
Start: 2023-01-31

## 2023-01-31 RX ORDER — ONDANSETRON 2 MG/ML
4 INJECTION INTRAMUSCULAR; INTRAVENOUS
Status: CANCELLED | OUTPATIENT
Start: 2023-01-31

## 2023-01-31 RX ORDER — ONDANSETRON 4 MG/1
4 TABLET, ORALLY DISINTEGRATING ORAL EVERY 6 HOURS PRN
Status: CANCELLED | OUTPATIENT
Start: 2023-01-31

## 2023-01-31 NOTE — TELEPHONE ENCOUNTER
"Staff message received from Dr. Betts:  \"Cannot be converted to MAC due to A1c, but A1c cutoff does not apply to conscious cases broadly. Would reach out to endoscopist about their thoughts\"    Writer forwarded staff message to [facility] provider for review if ok to proceed at .     Arline Thapa RN  Endoscopy Procedure Pre Assessment RN  -------------------------------------------  Addendum 1/31/2023  Staff message response from Dr. Martinez:  \"Should probably be rescheduled either somewhere else or when his blood sugar is under better control - maybe he can have his Ha1c rechecked to see if its better?\"    Writer sent to endoscopy scheduling to reschedule at hospital setting due to exclusion criteria of high A1C.    Arline Thapa RN  Endoscopy Procedure Pre Assessment RN    "

## 2023-01-31 NOTE — TELEPHONE ENCOUNTER
Caller: Left  and sent message to Hector  Reason for Reschedule/Cancellation (please be detailed, any staff messages or encounters to note?): Needs to be at hospital    Arline Thapa RN McBeath, Kristin, DO; SUSANNE Endoscopy Scheduling Pool  Dr. Martinez - Thank you for your review and recommendations. Writer will send to reschedule to a hospital setting due to ambulatory exclusion criteria of high A1C.     Endoscopy scheduling team- Please see note below. Please reschedule at hospital setting due to exclusion criteria.       Thank you all,   Arline Thapa RN   Endoscopy Procedure Pre Assessment RN           Previous Messages     ----- Message -----   From: Cordelia Martinez DO   Sent: 1/31/2023   2:36 PM CST   To: Arline Thapa RN   Subject: RE: A1C                                           He should probably be rescheduled either somewhere else or when his blood sugar is under better control - maybe he can have his Ha1c rechecked to see if its better?   Thanks         Prior to reschedule please review:    Ordering Provider:Olayinka    Sedation per order:CS    Does patient have any ASC Exclusions, please identify?: yes--needs Hospital due to A1C per staff message      Notes on Cancelled Procedure:    Procedure:Lower Endoscopy [Colonoscopy]     Date: 2/9/23    Location:[facility] [place]; [address], [place] [address]    Surgeon: Michelle        Rescheduled: No      Needs Hospital--case in depot

## 2023-02-03 NOTE — TELEPHONE ENCOUNTER
Caller: Luther Mariee  Reason for Reschedule/Cancellation (please be detailed, any staff messages or encounters to note?): needs hospital setting      Rescheduled: Yes    Procedure: Lower Endoscopy [Colonoscopy]     Date: 02/24/23    Location:[facility]; [address], [address], [place] [address]    Surgeon: Thanh    Sedation Level Scheduled  moderate,  Reason for Sedation Level per order    Prep/Instructions updated and sent: yes

## 2023-02-04 ENCOUNTER — ANCILLARY PROCEDURE (OUTPATIENT)
Dept: MRI IMAGING | Facility: CLINIC | Age: 58
End: 2023-02-04
Attending: FAMILY MEDICINE
Payer: COMMERCIAL

## 2023-02-04 DIAGNOSIS — I71.21 ANEURYSM OF ASCENDING AORTA WITHOUT RUPTURE (H): ICD-10-CM

## 2023-02-04 DIAGNOSIS — Z98.890 S/P AORTIC ANEURYSM REPAIR: ICD-10-CM

## 2023-02-04 DIAGNOSIS — Z86.79 S/P AORTIC ANEURYSM REPAIR: ICD-10-CM

## 2023-02-04 PROCEDURE — 71555 MRI ANGIO CHEST W OR W/O DYE: CPT | Performed by: RADIOLOGY

## 2023-02-04 PROCEDURE — A9585 GADOBUTROL INJECTION: HCPCS | Performed by: RADIOLOGY

## 2023-02-04 RX ORDER — GADOBUTROL 604.72 MG/ML
10 INJECTION INTRAVENOUS ONCE
Status: COMPLETED | OUTPATIENT
Start: 2023-02-04 | End: 2023-02-04

## 2023-02-04 RX ADMIN — GADOBUTROL 9.5 ML: 604.72 INJECTION INTRAVENOUS at 11:54

## 2023-02-06 ENCOUNTER — TELEPHONE (OUTPATIENT)
Dept: FAMILY MEDICINE | Facility: CLINIC | Age: 58
End: 2023-02-06
Payer: COMMERCIAL

## 2023-02-06 NOTE — TELEPHONE ENCOUNTER
Patient Quality Outreach    Patient is due for the following:   Diabetes -  A1C and LDL (Fasting)  Physical Annual Wellness Visit    Next Steps:   Schedule a Adult Preventative    Type of outreach:    Sent [employer] message.      Questions for provider review:    None     JACKIE ADORNO, CMA

## 2023-02-10 DIAGNOSIS — E11.65 TYPE 2 DIABETES MELLITUS WITH HYPERGLYCEMIA, WITHOUT LONG-TERM CURRENT USE OF INSULIN (H): ICD-10-CM

## 2023-02-10 RX ORDER — BISACODYL 5 MG/1
TABLET, DELAYED RELEASE ORAL
Qty: 4 TABLET | Refills: 0 | Status: SHIPPED | OUTPATIENT
Start: 2023-02-10 | End: 2024-02-09

## 2023-02-10 RX ORDER — GLIMEPIRIDE 4 MG/1
8 TABLET ORAL
Qty: 60 TABLET | Refills: 0 | Status: SHIPPED | OUTPATIENT
Start: 2023-02-10 | End: 2023-04-24

## 2023-02-10 NOTE — TELEPHONE ENCOUNTER
Rescheduled colonoscopy.    Attempted to contact patient regarding upcoming Colonoscopy  procedure on 2.24.2023 for pre assessment questions. No answer.     Left message to return call to [phone] #4    Discuss Covid policy and designated  policy.    Pre op exam scheduled: N/A    Arrival time: 0700. Procedure time: 0800    Facility location: [facility]; [address], [address], [place] [address]    Sedation type: Conscious sedation     Anticoagulants: No    Electronic implanted devices? No    Diabetic? Yes - Patient to hold oral diabetic medications day of procedure    Indication for procedure: + cologuard    Bowel prep recommendation: Standard Golytely      Prep instructions sent via [employer]. Bowel prep script sent to [facility] [address] [place] [facility] [place] - [address] KEYA Billy RN  Endoscopy Procedure Pre Assessment RN

## 2023-02-13 NOTE — TELEPHONE ENCOUNTER
Sent a Medigramt message to the pt stating he needs to schedule follow up for further refills  Kassandra Higgins MA

## 2023-02-14 DIAGNOSIS — E78.5 HYPERLIPIDEMIA LDL GOAL <100: ICD-10-CM

## 2023-02-14 RX ORDER — ROSUVASTATIN CALCIUM 20 MG/1
TABLET, COATED ORAL
Qty: 90 TABLET | Refills: 1 | Status: SHIPPED | OUTPATIENT
Start: 2023-02-14 | End: 2023-08-31

## 2023-02-15 DIAGNOSIS — E11.65 TYPE 2 DIABETES MELLITUS WITH HYPERGLYCEMIA, WITHOUT LONG-TERM CURRENT USE OF INSULIN (H): ICD-10-CM

## 2023-02-16 NOTE — TELEPHONE ENCOUNTER
Sent the pt a [employer] message to schedule follow up for further refills  Kassandra Higgins MA

## 2023-02-17 NOTE — TELEPHONE ENCOUNTER
Second attempt for pre-assessment prior to upcoming colonoscopy     No answer.  Unable to leave message as no vm picked up    Monae Medina, PAULA  Endoscopy Procedure Pre Assessment RN

## 2023-02-22 NOTE — TELEPHONE ENCOUNTER
Third attempt for pre-assessment prior to upcoming colonoscopy     No answer.  Left message to return call [phone] #4    Sandra Ramos RN  Endoscopy Procedure Pre Assessment RN

## 2023-02-23 ENCOUNTER — TELEPHONE (OUTPATIENT)
Dept: GASTROENTEROLOGY | Facility: CLINIC | Age: 58
End: 2023-02-23
Payer: COMMERCIAL

## 2023-02-23 NOTE — TELEPHONE ENCOUNTER
Caller: Luther Mariee   Reason for Reschedule/Cancellation (please be detailed, any staff messages or encounters to note?):     Per pt -- no        Prior to reschedule please review:    Ordering Provider:Watson Bhagat MD     Sedation per order:moderate     Does patient have any ASC Exclusions, please identify?: y       Notes on Cancelled Procedure:  1. Procedure:Lower Endoscopy [Colonoscopy]   2. Date: 02/24/2023  3. Location:[facility]; [address] [place] [address]  4. Surgeon: bon         Rescheduled: yes     Procedure: Lower Endoscopy [Colonoscopy]    Date: 04/12/2023    Location:[facility]; [address] [place] [address]    Surgeon: josie     Sedation Level Scheduled  Moderate          Reason for Sedation Level per order     Prep/Instructions updated and sent: lacey

## 2023-02-24 DIAGNOSIS — E11.65 TYPE 2 DIABETES MELLITUS WITH HYPERGLYCEMIA, WITHOUT LONG-TERM CURRENT USE OF INSULIN (H): ICD-10-CM

## 2023-02-24 RX ORDER — GLIMEPIRIDE 4 MG/1
8 TABLET ORAL
Qty: 180 TABLET | Refills: 1 | OUTPATIENT
Start: 2023-02-24

## 2023-02-27 NOTE — TELEPHONE ENCOUNTER
Called patient no answer left message to return call    IF PATIENT RETURNS CALL please schedule a physical/med check with PCP before getting anymore refills.     Thank you   Heide MAYA

## 2023-03-19 DIAGNOSIS — E11.65 TYPE 2 DIABETES MELLITUS WITH HYPERGLYCEMIA, WITHOUT LONG-TERM CURRENT USE OF INSULIN (H): ICD-10-CM

## 2023-03-20 RX ORDER — GLIMEPIRIDE 4 MG/1
8 TABLET ORAL
Qty: 60 TABLET | Refills: 0 | OUTPATIENT
Start: 2023-03-20

## 2023-03-21 ENCOUNTER — MYC MEDICAL ADVICE (OUTPATIENT)
Dept: FAMILY MEDICINE | Facility: CLINIC | Age: 58
End: 2023-03-21

## 2023-03-21 DIAGNOSIS — E11.65 TYPE 2 DIABETES MELLITUS WITH HYPERGLYCEMIA, WITHOUT LONG-TERM CURRENT USE OF INSULIN (H): ICD-10-CM

## 2023-03-21 NOTE — TELEPHONE ENCOUNTER
Sent a MyChart to the patient stating he has to follow up for further refills  Kassandra Higgins MA

## 2023-03-23 NOTE — TELEPHONE ENCOUNTER
Pt is schedule for April 21st. Please refill medication. Pt states he is taking 3 Metformin a day.    Kassandra Higgins MA

## 2023-03-24 DIAGNOSIS — E11.65 TYPE 2 DIABETES MELLITUS WITH HYPERGLYCEMIA, WITHOUT LONG-TERM CURRENT USE OF INSULIN (H): ICD-10-CM

## 2023-03-28 RX ORDER — BISACODYL 5 MG/1
TABLET, DELAYED RELEASE ORAL
Qty: 4 TABLET | Refills: 0 | Status: SHIPPED | OUTPATIENT
Start: 2023-03-28 | End: 2024-02-09

## 2023-03-28 NOTE — TELEPHONE ENCOUNTER
Attempted to contact patient regarding upcoming Colonoscopy  procedure on 4/12/2023 for pre assessment questions. No answer.     Left message to return call to [phone] #4    Monae Medina RN  Endoscopy Procedure Pre Assessment RN

## 2023-03-28 NOTE — TELEPHONE ENCOUNTER
Patient scheduled for Colonoscopy  on 4/12/2023.     Discuss Covid policy.     Pre op exam needed? N/A    Arrival time: 0800. Procedure time 0900    Facility location: [facility]; [address], [address], [place] [address]    Sedation type: Conscious sedation     Anticoagulations? No    Electronic implanted devices? No    Diabetic? Yes - Patient to hold oral diabetic medications day of procedure, Metformin, Glimepiride, Repaglinide    Indication for procedure: + Cologuard    Bowel prep recommendation: Miralax prep without magnesium citrate    Prep instructions sent via [employer]     Bowel prep script sent to [facility] [id number] [place] [employer] [id number]    Pre visit planning completed.    Monae Medina RN  Endoscopy Procedure Pre Assessment RN

## 2023-03-28 NOTE — TELEPHONE ENCOUNTER
Patient has an upcoming appointment with Dr. Bhagat scheduled for physical on 04/21/2023.    Future Appointments    Encounter Information    Provider Department Center   4/21/2023 8:00 AM (Arrive by 7:40 AM) Watson Bhagat MD [facility] JANEEN Castillo, [facility]

## 2023-04-05 NOTE — TELEPHONE ENCOUNTER
Second attempt for pre-assessment prior to upcoming colonoscopy     No answer.  Left message to return call [phone] #4    Monae Medina RN  Endoscopy Procedure Pre Assessment RN

## 2023-04-10 NOTE — TELEPHONE ENCOUNTER
Third attempt for pre-assessment prior to upcoming colonoscopy     No answer.  Left message to return call [phone] #4    Monae Medina RN  Endoscopy Procedure Pre Assessment RN

## 2023-04-11 NOTE — TELEPHONE ENCOUNTER
Patient calling to discuss what they can do as their sugars are running low.   Advised patient to ensure they are consuming calories such as jello or soup broth and that they can either have apple juice, pop or another sugary drink as long as it is not red or purple in color. Apple juice being the better option.     Patient verbalized understanding.     Upon review pre assessment was not completed.     Pre assessment questions completed for upcoming Colonoscopy  procedure scheduled on 4/12/23    COVID policy reviewed.     Pre-op exam? N/A    Reviewed procedural arrival time 0800, procedure time 0900 and facility location [facility]; [address], [address], [place] [address]    Designated  policy reviewed. Instructed to have someone stay 6 hours post procedure.     Anticoagulation/blood thinners? No    Electronic implanted devices? No    Diabetic? Yes - Patient to hold oral diabetic medications day of procedure    Per note below prep recommendation of miralax prep. Patient is DM. Golytely would be recommended. Patient with rescheduled procedure where Golytely was initially sent. Questioned patient if they have Golytely prep. Patient stated that they did pick that up. Informed patient to follow Golytely prep instructions that were sent in February. Reviewed Golytely prep instructions in great detail.     Patient verbalized understanding and had no questions or concerns at this time.    Arline Thapa RN  Endoscopy Procedure Pre Assessment RN

## 2023-04-12 ENCOUNTER — HOSPITAL ENCOUNTER (OUTPATIENT)
Facility: CLINIC | Age: 58
Discharge: HOME | End: 2023-04-12
Attending: INTERNAL MEDICINE | Admitting: INTERNAL MEDICINE
Payer: COMMERCIAL

## 2023-04-12 VITALS
OXYGEN SATURATION: 96 % | SYSTOLIC BLOOD PRESSURE: 120 MMHG | RESPIRATION RATE: 16 BRPM | DIASTOLIC BLOOD PRESSURE: 55 MMHG | HEART RATE: 59 BPM

## 2023-04-12 LAB — COLONOSCOPY: NORMAL

## 2023-04-12 PROCEDURE — 999N000099 HC STATISTIC MODERATE SEDATION < 10 MIN: Performed by: INTERNAL MEDICINE

## 2023-04-12 PROCEDURE — 45385 COLONOSCOPY W/LESION REMOVAL: CPT | Mod: PT | Performed by: INTERNAL MEDICINE

## 2023-04-12 PROCEDURE — 45380 COLONOSCOPY AND BIOPSY: CPT | Performed by: INTERNAL MEDICINE

## 2023-04-12 PROCEDURE — 250N000011 HC RX IP 250 OP 636: Performed by: INTERNAL MEDICINE

## 2023-04-12 PROCEDURE — 88305 TISSUE EXAM BY PATHOLOGIST: CPT | Mod: 26 | Performed by: PATHOLOGY

## 2023-04-12 PROCEDURE — G0500 MOD SEDAT ENDO SERVICE >5YRS: HCPCS | Performed by: INTERNAL MEDICINE

## 2023-04-12 PROCEDURE — 88305 TISSUE EXAM BY PATHOLOGIST: CPT | Mod: TC | Performed by: INTERNAL MEDICINE

## 2023-04-12 RX ORDER — FENTANYL CITRATE 50 UG/ML
INJECTION, SOLUTION INTRAMUSCULAR; INTRAVENOUS PRN
Status: DISCONTINUED | OUTPATIENT
Start: 2023-04-12 | End: 2023-04-12 | Stop reason: HOSPADM

## 2023-04-12 ASSESSMENT — ACTIVITIES OF DAILY LIVING (ADL): ADLS_ACUITY_SCORE: 35

## 2023-04-12 NOTE — H&P
ENDOSCOPY PRE-SEDATION H&P FOR OUTPATIENT PROCEDURES    Luther Mariee  [id number]  1965    Procedure: Colonoscopy    Pre-procedure diagnosis: Positive Cologuard    Past medical history:   Past Medical History:   Diagnosis Date     Acute renal failure (H) 2010    resolved     Aortic dissection (H) 2/23/10    ascending aorta. sees cv surgeon Dr. herr     Ascending aortic aneurysm (H)      Bicuspid aortic valve     sees card Dr. 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 Vane River.     Vitamin B12 deficiency      Vitamin D deficiencies      Patient Active Problem List   Diagnosis     Aortic dissection (H)     Splenic infarct     Hypertension goal BP (blood pressure) < 130/80     Health Care Home     Ascending aortic aneurysm (H)     SBO (small bowel obstruction) (H)     Ischemic colitis (H)     Bicuspid aortic valve     Morbid obesity (H)     Vitamin B12 deficiency     Vitamin D deficiency     Chronic pain     Hyperlipidemia LDL goal <100     H/O aortic dissection     CKD (chronic kidney disease) stage 2, GFR 60-89 ml/min     Diabetes mellitus, type 2 (H)     Aortic valve disorder     Calculus of gallbladder     Carotid dissection, bilateral (H)     Type 2 diabetes mellitus with complication, without long-term current use of insulin (H)       Past surgical history:   Past Surgical History:   Procedure Laterality Date     CHOLECYSTECTOMY, OPEN       COLONOSCOPY  2010    superficial ulcerws rt colon     ENDOVAS REPAIR, INFRARENL ABDOM AORTIC ANEURYSM/DISSECT; [id number]/AORTO-UNIFEMORAL PROSTHESIS  2/10    aorta dissection     SMALL BOWEL RESECTION  2010    Dr. Vane River       No current facility-administered medications for this encounter.       No Known Allergies      Physical Exam:    Mental status:  alert  Heart: Grade 2 systolic murmur  Lung: Normal  Assessment of patient's airway: Normal  Other as pertinent for procedure: None       Lab Results   Component Value Date    WBC 14.0 09/06/2012     Lab Results   Component Value Date    RBC 5.12 09/06/2012     Lab Results   Component Value Date    HGB 13.6 09/06/2012     Lab Results   Component Value Date    HCT 41.4 09/06/2012     Lab Results   Component Value Date    MCV 81 09/06/2012     Lab Results   Component Value Date    MCH 26.5 09/06/2012     Lab Results   Component Value Date    MCHC 32.7 09/06/2012     Lab Results   Component Value Date    RDW 17.8 09/06/2012     Lab Results   Component Value Date     09/06/2012     No results found for: INR     ASA Score: See Provation note    Mallampati score:  II - Faucial pillars and soft palate may be seen, but uvula is masked by the base of the tongue    Assessment/Plan:     The patient is an appropriate candidate to receive sedation.    Informed consent was discussed with the patient/family, including the risks, benefits, potential complications and any alternative options associated with sedation.    Patient assessment completed just prior to sedation and while under constant observation by the provider. Condition determined to be adequate for proceeding with sedation.    The specific risks for the procedure were discussed with the patient at the time of informed consent and include but are not limited to perforation which could require surgery, missing significant neoplasm or lesion, hemorrhage and adverse sedative complication.      Apolinar Borden MD

## 2023-04-13 LAB
PATH REPORT.COMMENTS IMP SPEC: NORMAL
PATH REPORT.COMMENTS IMP SPEC: NORMAL
PATH REPORT.FINAL DX SPEC: NORMAL
PATH REPORT.GROSS SPEC: NORMAL
PATH REPORT.MICROSCOPIC SPEC OTHER STN: NORMAL
PATH REPORT.RELEVANT HX SPEC: NORMAL
PHOTO IMAGE: NORMAL

## 2023-04-13 NOTE — RESULT ENCOUNTER NOTE
"Mr. Mariee -     I am writing to let you know the results of the polyp that was removed at the time of your colonoscopy.  The polyp returned as an \"adenomatous polyp\".  An adenoma is a type of benign polyp. If left in place for years, adenomas have a small risk of developing cancer.  There was no cancer in your polyp.  Your polyp was completely removed, but you are at risk to grow more adenomatous polyps in the future.      Based on your findings, current gastroenterology societies recommend a colonoscopy to look for new polyps in 7 to 10 years.  I suggest that you discuss this with your primary care provider(s) at that time.    If you have any questions, please don't hesitate to contact the Gastroenterology nurse at [phone].       Apolinar Borden MD  Professor of Medicine  Division of Gastroenterology, Hepatology, and Nutrition  [facility] "

## 2023-04-21 ENCOUNTER — OFFICE VISIT (OUTPATIENT)
Dept: FAMILY MEDICINE | Facility: CLINIC | Age: 58
End: 2023-04-21
Payer: COMMERCIAL

## 2023-04-21 VITALS
BODY MASS INDEX: 32.21 KG/M2 | RESPIRATION RATE: 16 BRPM | TEMPERATURE: 97.5 F | DIASTOLIC BLOOD PRESSURE: 66 MMHG | SYSTOLIC BLOOD PRESSURE: 120 MMHG | OXYGEN SATURATION: 97 % | HEART RATE: 53 BPM | WEIGHT: 200.4 LBS | HEIGHT: 66 IN

## 2023-04-21 DIAGNOSIS — R97.20 ELEVATED PROSTATE SPECIFIC ANTIGEN (PSA): ICD-10-CM

## 2023-04-21 DIAGNOSIS — E66.01 MORBID OBESITY (H): ICD-10-CM

## 2023-04-21 DIAGNOSIS — E11.8 TYPE 2 DIABETES MELLITUS WITH COMPLICATION, WITHOUT LONG-TERM CURRENT USE OF INSULIN (H): ICD-10-CM

## 2023-04-21 DIAGNOSIS — Z00.00 ROUTINE HISTORY AND PHYSICAL EXAMINATION OF ADULT: Primary | ICD-10-CM

## 2023-04-21 DIAGNOSIS — I71.00 DISSECTION OF AORTA, UNSPECIFIED PORTION OF AORTA (H): ICD-10-CM

## 2023-04-21 DIAGNOSIS — N18.2 CKD (CHRONIC KIDNEY DISEASE) STAGE 2, GFR 60-89 ML/MIN: ICD-10-CM

## 2023-04-21 LAB
ALBUMIN SERPL-MCNC: 3.5 G/DL (ref 3.4–5)
ALBUMIN UR-MCNC: 100 MG/DL
ALP SERPL-CCNC: 55 U/L (ref 40–150)
ALT SERPL W P-5'-P-CCNC: 28 U/L (ref 0–70)
ANION GAP SERPL CALCULATED.3IONS-SCNC: 8 MMOL/L (ref 3–14)
APPEARANCE UR: CLEAR
AST SERPL W P-5'-P-CCNC: 17 U/L (ref 0–45)
BILIRUB SERPL-MCNC: 0.7 MG/DL (ref 0.2–1.3)
BILIRUB UR QL STRIP: NEGATIVE
BUN SERPL-MCNC: 23 MG/DL (ref 7–30)
CALCIUM SERPL-MCNC: 9.8 MG/DL (ref 8.5–10.1)
CHLORIDE BLD-SCNC: 102 MMOL/L (ref 94–109)
CHOLEST SERPL-MCNC: 113 MG/DL
CO2 SERPL-SCNC: 26 MMOL/L (ref 20–32)
COLOR UR AUTO: YELLOW
CREAT SERPL-MCNC: 1.3 MG/DL (ref 0.66–1.25)
FASTING STATUS PATIENT QL REPORTED: YES
GFR SERPL CREATININE-BSD FRML MDRD: 64 ML/MIN/1.73M2
GLUCOSE BLD-MCNC: 177 MG/DL (ref 70–99)
GLUCOSE UR STRIP-MCNC: NEGATIVE MG/DL
HBA1C MFR BLD: 8.7 % (ref 0–5.6)
HDLC SERPL-MCNC: 35 MG/DL
HGB UR QL STRIP: NEGATIVE
KETONES UR STRIP-MCNC: NEGATIVE MG/DL
LDLC SERPL CALC-MCNC: <5 MG/DL
LEUKOCYTE ESTERASE UR QL STRIP: NEGATIVE
NITRATE UR QL: NEGATIVE
NONHDLC SERPL-MCNC: 78 MG/DL
PH UR STRIP: 5.5 [PH] (ref 5–7)
POTASSIUM BLD-SCNC: 4.1 MMOL/L (ref 3.4–5.3)
PROT SERPL-MCNC: 7.3 G/DL (ref 6.8–8.8)
PSA SERPL-MCNC: 10.3 UG/L (ref 0–4)
RBC #/AREA URNS AUTO: ABNORMAL /HPF
SODIUM SERPL-SCNC: 136 MMOL/L (ref 133–144)
SP GR UR STRIP: >=1.03 (ref 1–1.03)
SQUAMOUS #/AREA URNS AUTO: ABNORMAL /LPF
TRIGL SERPL-MCNC: 379 MG/DL
UROBILINOGEN UR STRIP-ACNC: 0.2 E.U./DL
WBC #/AREA URNS AUTO: ABNORMAL /HPF

## 2023-04-21 PROCEDURE — G0103 PSA SCREENING: HCPCS | Performed by: FAMILY MEDICINE

## 2023-04-21 PROCEDURE — 99396 PREV VISIT EST AGE 40-64: CPT | Mod: 25 | Performed by: FAMILY MEDICINE

## 2023-04-21 PROCEDURE — 90677 PCV20 VACCINE IM: CPT | Performed by: FAMILY MEDICINE

## 2023-04-21 PROCEDURE — 36415 COLL VENOUS BLD VENIPUNCTURE: CPT | Performed by: FAMILY MEDICINE

## 2023-04-21 PROCEDURE — 90471 IMMUNIZATION ADMIN: CPT | Performed by: FAMILY MEDICINE

## 2023-04-21 PROCEDURE — 81001 URINALYSIS AUTO W/SCOPE: CPT | Performed by: FAMILY MEDICINE

## 2023-04-21 PROCEDURE — 80053 COMPREHEN METABOLIC PANEL: CPT | Performed by: FAMILY MEDICINE

## 2023-04-21 PROCEDURE — 99214 OFFICE O/P EST MOD 30 MIN: CPT | Mod: 25 | Performed by: FAMILY MEDICINE

## 2023-04-21 PROCEDURE — 80061 LIPID PANEL: CPT | Performed by: FAMILY MEDICINE

## 2023-04-21 PROCEDURE — 90472 IMMUNIZATION ADMIN EACH ADD: CPT | Performed by: FAMILY MEDICINE

## 2023-04-21 PROCEDURE — 90750 HZV VACC RECOMBINANT IM: CPT | Performed by: FAMILY MEDICINE

## 2023-04-21 PROCEDURE — 83036 HEMOGLOBIN GLYCOSYLATED A1C: CPT | Performed by: FAMILY MEDICINE

## 2023-04-21 ASSESSMENT — ENCOUNTER SYMPTOMS
ARTHRALGIAS: 0
CHILLS: 0
HEARTBURN: 0
WEAKNESS: 0
NERVOUS/ANXIOUS: 0
MYALGIAS: 0
DIARRHEA: 0
SHORTNESS OF BREATH: 0
PARESTHESIAS: 0
JOINT SWELLING: 0
NAUSEA: 0
PALPITATIONS: 0
HEMATURIA: 0
DIZZINESS: 0
HEADACHES: 0
DYSURIA: 0
EYE PAIN: 0
FEVER: 0
CONSTIPATION: 0
ABDOMINAL PAIN: 0
HEMATOCHEZIA: 0
FREQUENCY: 0
SORE THROAT: 0
COUGH: 0

## 2023-04-21 NOTE — PROGRESS NOTES
SUBJECTIVE:   CC: Hector is an 57 year old who presents for preventative health visit.        View : No data to display.            Patient has been advised of split billing requirements and indicates understanding: Yes  Healthy Habits:     Getting at least 3 servings of Calcium per day:  Yes    Bi-annual eye exam:  NO    Dental care twice a year:  Yes    Sleep apnea or symptoms of sleep apnea:  None    Diet:  Low salt and Low fat/cholesterol    Frequency of exercise:  4-5 days/week    Duration of exercise:  15-30 minutes    Taking medications regularly:  Yes    Medication side effects:  None    PHQ-2 Total Score: 0    Additional concerns today:  No    Weight:  Wt Readings from Last 4 Encounters:   04/21/23 90.9 kg (200 lb 6.4 oz)   08/01/22 92.3 kg (203 lb 6.4 oz)   01/17/22 93.7 kg (206 lb 8 oz)   07/22/19 92.1 kg (203 lb)       Diabetes Follow-up   Consider Ozempic, to help with wth weight loss as well  How often are you checking your blood sugar? One time daily  What time of day are you checking your blood sugars (select all that apply)?  Before meals  Have you had any blood sugars above 200?  No  Have you had any blood sugars below 70?  No    What symptoms do you notice when your blood sugar is low?  None    What concerns do you have today about your diabetes? None     Do you have any of these symptoms? (Select all that apply)  No numbness or tingling in feet.  No redness, sores or blisters on feet.  No complaints of excessive thirst.  No reports of blurry vision.  No significant changes to weight.    Have you had a diabetic eye exam in the last 12 months? No      Hyperlipidemia Follow-Up      Are you regularly taking any medication or supplement to lower your cholesterol?   Yes- Crestor 20 mg     Are you having muscle aches or other side effects that you think could be caused by your cholesterol lowering medication?  No    Hypertension Follow-up      Do you check your blood pressure regularly outside of the  clinic? Yes     Are you following a low salt diet? No    Are your blood pressures ever more than 140 on the top number (systolic) OR more   than 90 on the bottom number (diastolic), for example 140/90? No    BP Readings from Last 2 Encounters:   23 120/66   23 120/55     Hemoglobin A1C (%)   Date Value   2022 10.3 (H)   2022 8.8 (H)   2021 10.1 (H)   2020 9.5 (H)     LDL Cholesterol Calculated (mg/dL)   Date Value   2022 <5   2022 8   2021     Cannot estimate LDL when triglyceride exceeds 400 mg/dL   2020     Cannot estimate LDL when triglyceride exceeds 400 mg/dL     LDL Cholesterol Direct (mg/dL)   Date Value   2021 70   2020 48         Today's PHQ-2 Score:       2023     7:51 AM   PHQ-2 ( 1999 [employer])   Q1: Little interest or pleasure in doing things 0   Q2: Feeling down, depressed or hopeless 0   PHQ-2 Score 0   Q1: Little interest or pleasure in doing things Not at all   Q2: Feeling down, depressed or hopeless Not at all   PHQ-2 Score 0       Have you ever done Advance Care Planning? (For example, a Health Directive, POLST, or a discussion with a medical provider or your loved ones about your wishes): No, advance care planning information given to patient to review.  Patient plans to discuss their wishes with loved ones or provider.      Social History     Tobacco Use     Smoking status: Former     Types: Cigarettes     Quit date: 2001     Years since quittin.3     Smokeless tobacco: Never   Vaping Use     Vaping status: Never Used     Passive vaping exposure: Yes   Substance Use Topics     Alcohol use: No     Alcohol/week: 0.0 standard drinks of alcohol         2023     7:50 AM   Alcohol Use   Prescreen: >3 drinks/day or >7 drinks/week? No          View : No data to display.                Last PSA:   PSA   Date Value Ref Range Status   2021 7.86 (H) 0 - 4 ug/L Final     Comment:     Assay Method:   Chemiluminescence using Siemens Vista analyzer     Prostate Specific Antigen Screen   Date Value Ref Range Status   2022 9.14 (H) 0.00 - 4.00 ug/L Final       Reviewed orders with patient. Reviewed health maintenance and updated orders accordingly - Yes  Patient Active Problem List   Diagnosis     Aortic dissection (H)     Splenic infarct     Hypertension goal BP (blood pressure) < 130/80     Health Care Home     Ascending aortic aneurysm (H)     SBO (small bowel obstruction) (H)     Ischemic colitis (H)     Bicuspid aortic valve     Morbid obesity (H)     Vitamin B12 deficiency     Vitamin D deficiency     Chronic pain     Hyperlipidemia LDL goal <100     H/O aortic dissection     CKD (chronic kidney disease) stage 2, GFR 60-89 ml/min     Diabetes mellitus, type 2 (H)     Aortic valve disorder     Calculus of gallbladder     Carotid dissection, bilateral (H)     Type 2 diabetes mellitus with complication, without long-term current use of insulin (H)     Past Surgical History:   Procedure Laterality Date     CHOLECYSTECTOMY, OPEN       COLONOSCOPY      superficial ulcerws rt colon     COLONOSCOPY N/A 2023    Procedure: COLONOSCOPY, WITH POLYPECTOMY AND BIOPSY;  Surgeon: Apolinar Borden MD;  Location: [facility]     ENDOVAS REPAIR, INFRARENL ABDOM AORTIC ANEURYSM/DISSECT; [id number]/AORTO-UNIFEMORAL PROSTHESIS  2/10    aorta dissection     SMALL BOWEL RESECTION      Dr. Vane River       Social History     Tobacco Use     Smoking status: Former     Types: Cigarettes     Quit date: 2001     Years since quittin.3     Smokeless tobacco: Never   Vaping Use     Vaping status: Never Used     Passive vaping exposure: Yes   Substance Use Topics     Alcohol use: No     Alcohol/week: 0.0 standard drinks of alcohol     Family History   Problem Relation Age of Onset     Unknown/Adopted Mother      Unknown/Adopted Father      Unknown/Adopted Maternal Grandmother      Unknown/Adopted Maternal  "Grandfather      Unknown/Adopted Paternal Grandmother      Unknown/Adopted Paternal Grandfather            Reviewed and updated as needed this visit by clinical staff   Tobacco  Allergies               Reviewed and updated as needed this visit by Provider                     Review of Systems   Constitutional: Negative for chills and fever.   HENT: Negative for congestion, ear pain, hearing loss and sore throat.    Eyes: Negative for pain and visual disturbance.   Respiratory: Negative for cough and shortness of breath.    Cardiovascular: Negative for chest pain, palpitations and peripheral edema.   Gastrointestinal: Negative for abdominal pain, constipation, diarrhea, heartburn, hematochezia and nausea.   Genitourinary: Negative for dysuria, frequency, genital sores, hematuria, impotence, penile discharge and urgency.   Musculoskeletal: Negative for arthralgias, joint swelling and myalgias.   Skin: Negative for rash.   Neurological: Negative for dizziness, weakness, headaches and paresthesias.   Psychiatric/Behavioral: Negative for mood changes. The patient is not nervous/anxious.      OBJECTIVE:   /66 (BP Location: Right arm, Patient Position: Sitting, Cuff Size: Adult Regular)   Pulse 53   Temp 97.5  F (36.4  C) (Oral)   Resp 16   Ht 1.676 m (5' 6\")   Wt 90.9 kg (200 lb 6.4 oz)   SpO2 97%   BMI 32.35 kg/m      Physical Exam  GENERAL: healthy, alert and no distress  EYES: Eyes grossly normal to inspection, PERRL and conjunctivae and sclerae normal  HENT: ear canals and TM's normal, nose and mouth without ulcers or lesions  NECK: no adenopathy and thyroid normal to palpation  RESP: lungs clear to auscultation - no rales, rhonchi or wheezes  CV: regular rate and rhythm, normal S1 S2, no S3 or S4, no murmur, click or rub, no peripheral edema   ABDOMEN: soft, nontender, no hepatosplenomegaly, no masses and bowel sounds normal  MS: no gross musculoskeletal defects noted, no edema  SKIN: no suspicious " lesions or rashes  NEURO: Normal strength and tone, mentation intact and speech normal  PSYCH: mentation appears normal, affect normal/bright    Diagnostic Test Results:  Labs reviewed in Epic    ASSESSMENT/PLAN:   Luther was seen today for physical.    Diagnoses and all orders for this visit:    Routine history and physical examination of adult   -     Lipid panel reflex to direct LDL Non-fasting  -     Comprehensive metabolic panel (BMP + Alb, Alk Phos, ALT, AST, Total. Bili, TP)    Type 2 diabetes mellitus with complication, without long-term current use of insulin (H)  -     Adult Eye  Referral; Future  -     HEMOGLOBIN A1C; Future  -     Lipid panel reflex to direct LDL Non-fasting; Future  -     Comprehensive metabolic panel (BMP + Alb, Alk Phos, ALT, AST, Total. Bili, TP); Future  -     HEMOGLOBIN A1C  -     Lipid panel reflex to direct LDL Non-fasting  -     Comprehensive metabolic panel (BMP + Alb, Alk Phos, ALT, AST, Total. Bili, TP)    Dissection of aorta, unspecified portion of aorta (H)        -    Following with cardiovascular    CKD (chronic kidney disease) stage 2, GFR 60-89 ml/min  -     UA reflex to Microscopic and Culture ([id number]); Future  -     UA reflex to Microscopic and Culture ([id number])  -     UA Microscopic with Reflex to Culture    Elevated prostate specific antigen (PSA)  -     PSA, screen; Future  -     Adult Urology  Referral; Future  -     PSA, screen    Other orders  -     Pneumococcal 20 Valent Conjugate (Prevnar 20)  -     ZOSTER VACCINE RECOMBINANT ADJUVANTED (SHINGRIX)      Patient has been advised of split billing requirements and indicates understanding: Yes      COUNSELING:   Reviewed preventive health counseling, as reflected in patient instructions       Regular exercise       Healthy diet/nutrition       Immunizations    Vaccinated for: Pneumococcal and Zoster           Prostate cancer screening    BMI 32.0-32.9,adult  Estimated body mass index is 32.35  "kg/m  as calculated from the following:    Height as of this encounter: 1.676 m (5' 6\").    Weight as of this encounter: 90.9 kg (200 lb 6.4 oz).   Weight management plan: Discussed healthy diet and exercise guidelines      He reports that he quit smoking about 21 years ago. He has never used smokeless tobacco.      {Counseling Resources  [place] Preventive Services Task Force  Cholesterol Screening  Health diet/nutrition  Pooled Cohorts Equation Calculator  USDA's MyPlate  ASA Prophylaxis  Lung CA Screening  Osteoporosis prevention/bone health  {Prostate Cancer Screening  Consider for men 55-69 per guidance from USPSTF    Watson Bhagat MD  [facility]  "

## 2023-04-23 ENCOUNTER — HEALTH MAINTENANCE LETTER (OUTPATIENT)
Age: 58
End: 2023-04-23

## 2023-04-24 RX ORDER — SEMAGLUTIDE 1.34 MG/ML
0.25 INJECTION, SOLUTION SUBCUTANEOUS
Qty: 1.5 ML | Refills: 0 | Status: SHIPPED | OUTPATIENT
Start: 2023-04-24 | End: 2023-12-24 | Stop reason: SINTOL

## 2023-05-10 ENCOUNTER — MYC MEDICAL ADVICE (OUTPATIENT)
Dept: FAMILY MEDICINE | Facility: CLINIC | Age: 58
End: 2023-05-10
Payer: COMMERCIAL

## 2023-05-10 DIAGNOSIS — E11.65 TYPE 2 DIABETES MELLITUS WITH HYPERGLYCEMIA, WITHOUT LONG-TERM CURRENT USE OF INSULIN (H): ICD-10-CM

## 2023-05-13 ENCOUNTER — MYC MEDICAL ADVICE (OUTPATIENT)
Dept: FAMILY MEDICINE | Facility: CLINIC | Age: 58
End: 2023-05-13
Payer: COMMERCIAL

## 2023-05-22 DIAGNOSIS — L30.9 ECZEMA, UNSPECIFIED TYPE: ICD-10-CM

## 2023-05-22 RX ORDER — MOMETASONE FUROATE 1 MG/G
OINTMENT TOPICAL
Qty: 15 G | Refills: 2 | Status: SHIPPED | OUTPATIENT
Start: 2023-05-22

## 2023-06-04 DIAGNOSIS — E11.8 TYPE 2 DIABETES MELLITUS WITH COMPLICATION, WITHOUT LONG-TERM CURRENT USE OF INSULIN (H): ICD-10-CM

## 2023-06-05 RX ORDER — REPAGLINIDE 1 MG/1
TABLET ORAL
Qty: 270 TABLET | Refills: 0 | Status: SHIPPED | OUTPATIENT
Start: 2023-06-05 | End: 2023-09-01

## 2023-07-01 DIAGNOSIS — E11.65 TYPE 2 DIABETES MELLITUS WITH HYPERGLYCEMIA, WITHOUT LONG-TERM CURRENT USE OF INSULIN (H): ICD-10-CM

## 2023-07-25 DIAGNOSIS — E11.65 TYPE 2 DIABETES MELLITUS WITH HYPERGLYCEMIA, WITHOUT LONG-TERM CURRENT USE OF INSULIN (H): ICD-10-CM

## 2023-08-31 DIAGNOSIS — E78.5 HYPERLIPIDEMIA LDL GOAL <100: ICD-10-CM

## 2023-08-31 RX ORDER — ROSUVASTATIN CALCIUM 20 MG/1
TABLET, COATED ORAL
Qty: 90 TABLET | Refills: 1 | Status: SHIPPED | OUTPATIENT
Start: 2023-08-31 | End: 2024-02-09

## 2023-09-01 DIAGNOSIS — E11.8 TYPE 2 DIABETES MELLITUS WITH COMPLICATION, WITHOUT LONG-TERM CURRENT USE OF INSULIN (H): ICD-10-CM

## 2023-09-01 RX ORDER — REPAGLINIDE 1 MG/1
TABLET ORAL
Qty: 270 TABLET | Refills: 0 | Status: SHIPPED | OUTPATIENT
Start: 2023-09-01 | End: 2023-10-30

## 2023-09-11 ENCOUNTER — TELEPHONE (OUTPATIENT)
Dept: FAMILY MEDICINE | Facility: CLINIC | Age: 58
End: 2023-09-11
Payer: COMMERCIAL

## 2023-09-11 NOTE — LETTER
September 11, 2023    To  Luther Mariee  [address] [place] [id number]    Your team at [facility] cares about your health. We have reviewed your chart and based on our findings; we are making the following recommendations to better manage your health.     You are in particular need of attention regarding the following:     Schedule a DIABETIC FOLLOW UP appointment for Office Visit  Lab Only Visit. Patients with diabetes should see their provider regularly.  Schedule a DIABETIC EYE EXAM.  This exam is done with an optometrist, annually. You can schedule this appointment with your eye doctor.  If you need a referral, please let us know.    If you have already completed these items, please contact the clinic via phone or   [employer] so your care team can review and update your records. Thank you for   choosing [facility] for your healthcare needs. For any questions,   concerns, or to schedule an appointment please contact our clinic.    Healthy Regards,      Your [facility] Care Team

## 2023-09-11 NOTE — TELEPHONE ENCOUNTER
Patient Quality Outreach    Patient is due for the following:   Diabetes -  A1C, LDL (Fasting), Microalbumin, and Diabetic Follow-Up Visit    Next Steps:   Schedule a office visit for diabetes    Type of outreach:    Sent [employer] message. and Sent letter.      Questions for provider review:    None           JACKIE ADORNO, CMA

## 2023-09-23 ENCOUNTER — HEALTH MAINTENANCE LETTER (OUTPATIENT)
Age: 58
End: 2023-09-23

## 2023-10-29 DIAGNOSIS — E11.8 TYPE 2 DIABETES MELLITUS WITH COMPLICATION, WITHOUT LONG-TERM CURRENT USE OF INSULIN (H): ICD-10-CM

## 2023-10-29 DIAGNOSIS — I10 HTN, GOAL BELOW 140/90: ICD-10-CM

## 2023-10-29 DIAGNOSIS — E11.65 TYPE 2 DIABETES MELLITUS WITH HYPERGLYCEMIA, WITHOUT LONG-TERM CURRENT USE OF INSULIN (H): ICD-10-CM

## 2023-10-30 RX ORDER — LISINOPRIL AND HYDROCHLOROTHIAZIDE 20; 25 MG/1; MG/1
TABLET ORAL
Qty: 90 TABLET | Refills: 1 | Status: SHIPPED | OUTPATIENT
Start: 2023-10-30 | End: 2024-02-09

## 2023-10-30 RX ORDER — REPAGLINIDE 1 MG/1
TABLET ORAL
Qty: 270 TABLET | Refills: 0 | Status: SHIPPED | OUTPATIENT
Start: 2023-10-30 | End: 2024-02-09

## 2023-10-30 RX ORDER — GLIMEPIRIDE 4 MG/1
8 TABLET ORAL
Qty: 180 TABLET | Refills: 0 | Status: SHIPPED | OUTPATIENT
Start: 2023-10-30 | End: 2024-02-05

## 2023-11-10 DIAGNOSIS — E11.65 TYPE 2 DIABETES MELLITUS WITH HYPERGLYCEMIA, WITHOUT LONG-TERM CURRENT USE OF INSULIN (H): ICD-10-CM

## 2023-12-24 ENCOUNTER — OFFICE VISIT (OUTPATIENT)
Dept: URGENT CARE | Facility: URGENT CARE | Age: 58
End: 2023-12-24
Payer: COMMERCIAL

## 2023-12-24 VITALS
BODY MASS INDEX: 32.18 KG/M2 | HEART RATE: 90 BPM | DIASTOLIC BLOOD PRESSURE: 71 MMHG | WEIGHT: 205 LBS | SYSTOLIC BLOOD PRESSURE: 129 MMHG | TEMPERATURE: 98 F | HEIGHT: 67 IN | OXYGEN SATURATION: 99 % | RESPIRATION RATE: 17 BRPM

## 2023-12-24 DIAGNOSIS — H10.31 ACUTE CONJUNCTIVITIS OF RIGHT EYE, UNSPECIFIED ACUTE CONJUNCTIVITIS TYPE: Primary | ICD-10-CM

## 2023-12-24 PROCEDURE — 99213 OFFICE O/P EST LOW 20 MIN: CPT

## 2023-12-24 RX ORDER — POLYMYXIN B SULFATE AND TRIMETHOPRIM 1; 10000 MG/ML; [USP'U]/ML
1-2 SOLUTION OPHTHALMIC EVERY 4 HOURS
Qty: 10 ML | Refills: 0 | Status: SHIPPED | OUTPATIENT
Start: 2023-12-24 | End: 2023-12-29

## 2023-12-24 NOTE — PATIENT INSTRUCTIONS
You have conjunctivitis (pink eye), most likely viral. It should clear up on its own. Use warm wet compresses a couple times a day.    If in 5 days your eye is not getting better, start taking the eye drops. Take them for a total of 5 days, if it doesn't get better take it an additional 2 days.    If you get fever, vision changes, or eye pain be seen in person for more workup.

## 2023-12-24 NOTE — PROGRESS NOTES
"Assessment & Plan     There are no diagnoses linked to this encounter.   15 minutes spent by me on the date of the encounter doing chart review, patient visit, and documentation  MH of HLD, diabetes, HTN, aortic dissection, CKD 2, presenting with 3-4 days of right sided likely viral conjunctivitis (watery with mucous).    Plan:  - Warm compresses  - If in 5 days symptoms not improving, fill prescription for trimethoprim-polymyxin and take for 5-7 days  - Return precautions discussed    No follow-ups on file.    Lenore Ceja MD  [facility]    Arielle Young is a 58 year old male who presents to clinic today for the following health issues:    Chief Complaint   Patient presents with    Eye Problem     X3 days of redness, fluid draining, itching in right eye      HPI  PMH of HLD, diabetes, HTN, aortic dissection, CKD 2, presenting with 3-4 days of right eye discharge, erythema, and itching. No vision changes apart from when occluded by discharge. Crusty white discharge in the morning.    No headaches, fevers, cough, congestion, vomiting, diarrhea.      Review of Systems  ROS x10 negative except as in HPI above.      Objective    /71   Pulse 90   Temp 98  F (36.7  C) (Oral)   Resp 17   Ht 1.702 m (5' 7\")   Wt 93 kg (205 lb)   SpO2 99%   BMI 32.11 kg/m    Physical Exam   GENERAL: healthy, alert and no distress  NECK: no adenopathy, no asymmetry, masses, or scars and thyroid normal to palpation  HEENT: Right eye erythema with clear discharge.  RESP: lungs clear to auscultation - no rales, rhonchi or wheezes  CV: regular rate and rhythm, normal S1 S2, no S3 or S4, no murmur, click or rub, no peripheral edema and peripheral pulses strong  ABDOMEN: soft, nontender, no hepatosplenomegaly, no masses and bowel sounds normal  MS: no gross musculoskeletal defects noted, no edema        Lenore Ceja MD  Internal Medicine-Pediatrics PGY3  "

## 2023-12-28 ENCOUNTER — OFFICE VISIT (OUTPATIENT)
Dept: OPTOMETRY | Facility: CLINIC | Age: 58
End: 2023-12-28
Payer: COMMERCIAL

## 2023-12-28 DIAGNOSIS — B02.33 HERPES ZOSTER KERATOCONJUNCTIVITIS: Primary | ICD-10-CM

## 2023-12-28 PROCEDURE — 92004 COMPRE OPH EXAM NEW PT 1/>: CPT | Performed by: OPTOMETRIST

## 2023-12-28 RX ORDER — VALACYCLOVIR HYDROCHLORIDE 1 G/1
1000 TABLET, FILM COATED ORAL 3 TIMES DAILY
Qty: 21 TABLET | Refills: 0 | Status: SHIPPED | OUTPATIENT
Start: 2023-12-28 | End: 2024-02-09

## 2023-12-28 ASSESSMENT — KERATOMETRY
OS_AXISANGLE2_DEGREES: 053
OD_K1POWER_DIOPTERS: 40.50
OS_K2POWER_DIOPTERS: 43.25
OS_K1POWER_DIOPTERS: 42.75
OD_K2POWER_DIOPTERS: 47.00
OS_AXISANGLE_DEGREES: 143
OD_AXISANGLE2_DEGREES: 028
OD_AXISANGLE_DEGREES: 118

## 2023-12-28 ASSESSMENT — CONF VISUAL FIELD
OS_SUPERIOR_TEMPORAL_RESTRICTION: 0
OD_INFERIOR_NASAL_RESTRICTION: 0
OD_INFERIOR_TEMPORAL_RESTRICTION: 0
OD_NORMAL: 1
OS_INFERIOR_TEMPORAL_RESTRICTION: 0
OD_SUPERIOR_NASAL_RESTRICTION: 0
OD_SUPERIOR_TEMPORAL_RESTRICTION: 0
OS_SUPERIOR_NASAL_RESTRICTION: 0
METHOD: COUNTING FINGERS
OS_NORMAL: 1
OS_INFERIOR_NASAL_RESTRICTION: 0

## 2023-12-28 ASSESSMENT — REFRACTION_MANIFEST
OS_AXIS: 168
OS_CYLINDER: +0.75
OD_AXIS: 070
OS_SPHERE: PLANO
OD_SPHERE: -0.50
OD_CYLINDER: +1.75
METHOD_AUTOREFRACTION: 1

## 2023-12-28 ASSESSMENT — SLIT LAMP EXAM - LIDS
COMMENTS: NORMAL
COMMENTS: NORMAL, NO LESIONS

## 2023-12-28 ASSESSMENT — VISUAL ACUITY
OS_SC: 20/80-1
OS_SC: 20/20
OD_SC: 20/30
METHOD: SNELLEN - LINEAR
OD_SC+: -1
OS_SC+: -1
OD_SC: 20/20-2

## 2023-12-28 ASSESSMENT — EXTERNAL EXAM - LEFT EYE: OS_EXAM: NORMAL

## 2023-12-28 ASSESSMENT — EXTERNAL EXAM - RIGHT EYE: OD_EXAM: NORMAL

## 2023-12-28 NOTE — LETTER
12/28/2023         RE: Luther Mariee  [address] [facility] [address]        Dear Colleague,    Thank you for referring your patient, Luther Mariee, to the [facility]. Please see a copy of my visit note below.    Chief Complaint   Patient presents with     Conjunctivitis     Diabetic Eye Exam        Chief Complaint(s) and History of Present Illness(es)         Diabetic Eye Exam              Associated symptoms: discharge, itching and tearing    Diabetes Type: Type 2 and taking oral medications    Duration: years    Blood Sugars: is controlled                   Lab Results   Component Value Date    A1C 8.7 04/21/2023    A1C 10.3 08/01/2022    A1C 8.8 01/17/2022    A1C 10.1 06/03/2021    A1C 9.5 12/24/2020    A1C 11.1 07/22/2019    A1C 11.2 12/28/2018    A1C 6.5 08/31/2015        Conjunctivitis  In right eye. Characterized as blood shot. Urgent care diagnosed with conjunctivitis 12/24/2023. Associated symptoms include eye pain, foreign body sensation, irritation, itching, tearing, and discharge. Occurring constantly. Duration of 7 days. Since onset it is stable. Treatments tried include warm compresses(only used a few times - last used yesterday) and antibiotic drops (Polytrim - started drops 12/27/2023 and has used q3h since then - last used last night ~9:00PM). Response to treatment was mild improvement. Present during recent contact with pink eye.     Last Eye Exam: Never had one   Dilated Previously: No, side effects of dilation explained today     What are you currently using to see?  +1.75 readers occasionally    Distance Vision Acuity: Satisfied with vision    Near Vision Acuity: Satisfied with vision while reading and using computer with readers    Eye Comfort: good overall - see notes   Do you use eye drops? : No  Occupation or Hobbies:     Arleen Jiménez  Optometry Assistant     Medical, surgical and family histories reviewed and updated  12/28/2023.       OBJECTIVE: See Ophthalmology exam    ASSESSMENT:    ICD-10-CM    1. Herpes zoster keratoconjunctivitis  B02.33           PLAN:    Luther Mariee aware  eye exam results will be sent to Watson Bhagat.  Patient Instructions   Continue the Polytrim eyedrops 4x daily in the right eye.     Begin course of Valtrex. 1 pill 3x daily by mouth.     Return for follow-up Tuesday 1/9/2024 @ 10:15am.       Jean Paul Hirsch OD  [address]  [address]    [phone]            Again, thank you for allowing me to participate in the care of your patient.        Sincerely,        Jean Paul Hirsch OD

## 2023-12-28 NOTE — PROGRESS NOTES
Chief Complaint   Patient presents with    Conjunctivitis    Diabetic Eye Exam        Chief Complaint(s) and History of Present Illness(es)         Diabetic Eye Exam              Associated symptoms: discharge, itching and tearing    Diabetes Type: Type 2 and taking oral medications    Duration: years    Blood Sugars: is controlled                   Lab Results   Component Value Date    A1C 8.7 04/21/2023    A1C 10.3 08/01/2022    A1C 8.8 01/17/2022    A1C 10.1 06/03/2021    A1C 9.5 12/24/2020    A1C 11.1 07/22/2019    A1C 11.2 12/28/2018    A1C 6.5 08/31/2015        Conjunctivitis  In right eye. Characterized as blood shot. Urgent care diagnosed with conjunctivitis 12/24/2023. Associated symptoms include eye pain, foreign body sensation, irritation, itching, tearing, and discharge. Occurring constantly. Duration of 7 days. Since onset it is stable. Treatments tried include warm compresses(only used a few times - last used yesterday) and antibiotic drops (Polytrim - started drops 12/27/2023 and has used q3h since then - last used last night ~9:00PM). Response to treatment was mild improvement. Present during recent contact with pink eye.     Last Eye Exam: Never had one   Dilated Previously: No, side effects of dilation explained today     What are you currently using to see?  +1.75 readers occasionally    Distance Vision Acuity: Satisfied with vision    Near Vision Acuity: Satisfied with vision while reading and using computer with readers    Eye Comfort: good overall - see notes   Do you use eye drops? : No  Occupation or Hobbies:     Arleen Jiménez  Optometry Assistant     Medical, surgical and family histories reviewed and updated 12/28/2023.       OBJECTIVE: See Ophthalmology exam    ASSESSMENT:    ICD-10-CM    1. Herpes zoster keratoconjunctivitis  B02.33           PLAN:    Luther Mariee aware  eye exam results will be sent to Watson Bhagat.  Patient Instructions   Continue  the Polytrim eyedrops 4x daily in the right eye.     Begin course of Valtrex. 1 pill 3x daily by mouth.     Return for follow-up Tuesday 1/9/2024 @ 10:15am.       Jean Paul Hirsch OD  [address]  [place]  [address]    [phone]

## 2023-12-28 NOTE — PATIENT INSTRUCTIONS
Continue the Polytrim eyedrops 4x daily in the right eye.     Begin course of Valtrex. 1 pill 3x daily by mouth.     Return for follow-up Tuesday 1/9/2024 @ 10:15am.       Jean Paul Hirsch OD  [address]  [place]  [address]    [phone]

## 2024-01-04 ENCOUNTER — TELEPHONE (OUTPATIENT)
Dept: OPTOMETRY | Facility: CLINIC | Age: 59
End: 2024-01-04
Payer: COMMERCIAL

## 2024-01-04 NOTE — TELEPHONE ENCOUNTER
[facility]    Phone Message    May a detailed message be left on voicemail: yes     Reason for Call: Other: PT STATES THAT HE HAS EYE SHINGLES IN HIS EYE AND WANTS TO KNOW IF HE IS CONTAGIOUS OR NOT. PT WANTS TO RETURN TO WORK BUT WOULD LIKE TO OKAY OR SHOULD HE JUST WAIT UNTIL HE SEES  ON TUESDAY 2/9/24. PLEASE REVIEW AND CONTACT PT TO DISCUSS ALSO CAN SEND A [employer] MESSAGE IF NOT ABLE TO REACH. THANK YOU        Action Taken: Message routed to:  [facility]    Travel Screening: Not Applicable

## 2024-01-05 NOTE — TELEPHONE ENCOUNTER
M for pt - Dr. Hirsch out of office until 1/9/24.     Spoke with Dr. Field - she states he should be able to return to work as long as the symptoms aren't active/worsening and the shingles lesions are scabbed over.     If there is any pus/fluid/blood or openness to the shingles themselves, do not return to work. 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     Continue your course of treatment and return to clinic for follow up with Dr. Hirsch 1/9/24 10:15AM.     Arleen  Optometry Assistant

## 2024-01-09 ENCOUNTER — OFFICE VISIT (OUTPATIENT)
Dept: OPTOMETRY | Facility: CLINIC | Age: 59
End: 2024-01-09
Payer: COMMERCIAL

## 2024-01-09 DIAGNOSIS — B02.33 HERPES ZOSTER KERATOCONJUNCTIVITIS: Primary | ICD-10-CM

## 2024-01-09 PROCEDURE — 92012 INTRM OPH EXAM EST PATIENT: CPT | Performed by: OPTOMETRIST

## 2024-01-09 ASSESSMENT — CONF VISUAL FIELD
OD_INFERIOR_NASAL_RESTRICTION: 0
OD_SUPERIOR_NASAL_RESTRICTION: 0
OS_SUPERIOR_TEMPORAL_RESTRICTION: 0
OD_NORMAL: 1
OS_SUPERIOR_NASAL_RESTRICTION: 0
OS_NORMAL: 1
OD_INFERIOR_TEMPORAL_RESTRICTION: 0
OS_INFERIOR_NASAL_RESTRICTION: 0
METHOD: COUNTING FINGERS
OD_SUPERIOR_TEMPORAL_RESTRICTION: 0
OS_INFERIOR_TEMPORAL_RESTRICTION: 0

## 2024-01-09 ASSESSMENT — EXTERNAL EXAM - LEFT EYE: OS_EXAM: NORMAL

## 2024-01-09 ASSESSMENT — VISUAL ACUITY
OD_PH_SC: 20/25
OD_SC: 20/40
OS_SC+: +2
METHOD: SNELLEN - LINEAR
OS_PH_SC: 20/20
OD_PH_SC+: +1
OD_SC: 20/40-1
OS_SC: 20/25
OS_SC: 20/60-1

## 2024-01-09 ASSESSMENT — SLIT LAMP EXAM - LIDS
COMMENTS: NORMAL
COMMENTS: NORMAL

## 2024-01-09 ASSESSMENT — EXTERNAL EXAM - RIGHT EYE: OD_EXAM: NORMAL

## 2024-01-09 NOTE — LETTER
1/9/2024         RE: Luther Mariee  [address]  [facility] [address]        Dear Colleague,    Thank you for referring your patient, Luther Mariee, to the [facility]. Please see a copy of my visit note below.    Chief Complaint   Patient presents with     Herpes Zoster Follow Up     Shingles follow up - last seen 12/28/23. Completed course of Polytrim drops 4x/day - last used 1/4/24. Almost completed course of antiviral medication (Valtrex) but states they made him nauseous so he discontinued with ~2 pills left. Last used ~1/4/24.     States physical symptoms have recovered and feels 100% normal now.     Only concern is vision- states he has a hard time reading up close and states it is much worse since shingles dx.      Do you wear contact lenses? No       Arleen Jiménez  Optometry Assistant     Medical, surgical and family histories reviewed and updated 1/9/2024.         OBJECTIVE: See Ophthalmology exam    ASSESSMENT:    ICD-10-CM    1. Herpes zoster keratoconjunctivitis  B02.33          PLAN:    Patient Instructions   Improved corneal appearance.     Return for comprehensive (diabetic) eye exam.     Notify me with any further concerns.       Jean Paul Hirsch, TOREY  [facility]  [address]. [place]  [address]    [phone]        Again, thank you for allowing me to participate in the care of your patient.        Sincerely,        Jean Paul Hirsch OD

## 2024-01-09 NOTE — PROGRESS NOTES
Chief Complaint   Patient presents with    Herpes Zoster Follow Up     Shingles follow up - last seen 12/28/23. Completed course of Polytrim drops 4x/day - last used 1/4/24. Almost completed course of antiviral medication (Valtrex) but states they made him nauseous so he discontinued with ~2 pills left. Last used ~1/4/24.     States physical symptoms have recovered and feels 100% normal now.     Only concern is vision- states he has a hard time reading up close and states it is much worse since shingles dx.      Do you wear contact lenses? No       Arleen Jiménez  Optometry Assistant     Medical, surgical and family histories reviewed and updated 1/9/2024.         OBJECTIVE: See Ophthalmology exam    ASSESSMENT:    ICD-10-CM    1. Herpes zoster keratoconjunctivitis  B02.33          PLAN:    Patient Instructions   Improved corneal appearance.     Return for comprehensive (diabetic) eye exam.     Notify me with any further concerns.       Jean Paul Hirsch, OD  [facility] [place]  [address]  [place]  [address]    [phone]

## 2024-01-09 NOTE — PATIENT INSTRUCTIONS
Improved corneal appearance.     Return for comprehensive (diabetic) eye exam.     Notify me with any further concerns.       Jean Paul Hirsch OD  [address]  [place]  [address] [phone]

## 2024-02-04 DIAGNOSIS — E11.65 TYPE 2 DIABETES MELLITUS WITH HYPERGLYCEMIA, WITHOUT LONG-TERM CURRENT USE OF INSULIN (H): ICD-10-CM

## 2024-02-05 RX ORDER — GLIMEPIRIDE 4 MG/1
8 TABLET ORAL
Qty: 180 TABLET | Refills: 0 | Status: SHIPPED | OUTPATIENT
Start: 2024-02-05 | End: 2024-02-09

## 2024-02-09 ENCOUNTER — OFFICE VISIT (OUTPATIENT)
Dept: FAMILY MEDICINE | Facility: CLINIC | Age: 59
End: 2024-02-09
Payer: COMMERCIAL

## 2024-02-09 ENCOUNTER — OFFICE VISIT (OUTPATIENT)
Dept: OPTOMETRY | Facility: CLINIC | Age: 59
End: 2024-02-09
Payer: COMMERCIAL

## 2024-02-09 VITALS
HEIGHT: 67 IN | RESPIRATION RATE: 17 BRPM | DIASTOLIC BLOOD PRESSURE: 57 MMHG | HEART RATE: 53 BPM | SYSTOLIC BLOOD PRESSURE: 94 MMHG | BODY MASS INDEX: 31.23 KG/M2 | OXYGEN SATURATION: 96 % | WEIGHT: 199 LBS

## 2024-02-09 DIAGNOSIS — H52.221 REGULAR ASTIGMATISM OF RIGHT EYE: ICD-10-CM

## 2024-02-09 DIAGNOSIS — E11.65 TYPE 2 DIABETES MELLITUS WITH HYPERGLYCEMIA, WITHOUT LONG-TERM CURRENT USE OF INSULIN (H): ICD-10-CM

## 2024-02-09 DIAGNOSIS — E11.8 TYPE 2 DIABETES MELLITUS WITH COMPLICATION, WITHOUT LONG-TERM CURRENT USE OF INSULIN (H): Primary | ICD-10-CM

## 2024-02-09 DIAGNOSIS — I71.00 DISSECTION OF AORTA, UNSPECIFIED PORTION OF AORTA (H): ICD-10-CM

## 2024-02-09 DIAGNOSIS — E66.01 MORBID OBESITY (H): ICD-10-CM

## 2024-02-09 DIAGNOSIS — I10 HTN, GOAL BELOW 140/90: ICD-10-CM

## 2024-02-09 DIAGNOSIS — H52.4 PRESBYOPIA: ICD-10-CM

## 2024-02-09 DIAGNOSIS — E11.9 TYPE 2 DIABETES MELLITUS WITHOUT RETINOPATHY (H): ICD-10-CM

## 2024-02-09 DIAGNOSIS — R20.2 TINGLING OF BOTH FEET: ICD-10-CM

## 2024-02-09 DIAGNOSIS — N18.2 CKD (CHRONIC KIDNEY DISEASE) STAGE 2, GFR 60-89 ML/MIN: ICD-10-CM

## 2024-02-09 DIAGNOSIS — Z01.01 ENCOUNTER FOR EXAMINATION OF EYES AND VISION WITH ABNORMAL FINDINGS: Primary | ICD-10-CM

## 2024-02-09 DIAGNOSIS — H17.9 RIGHT CORNEA SCAR: ICD-10-CM

## 2024-02-09 DIAGNOSIS — R97.20 ELEVATED PROSTATE SPECIFIC ANTIGEN (PSA): ICD-10-CM

## 2024-02-09 DIAGNOSIS — H52.11 MYOPIA, RIGHT: ICD-10-CM

## 2024-02-09 DIAGNOSIS — L84 CORN OR CALLUS: ICD-10-CM

## 2024-02-09 DIAGNOSIS — Z86.19 HISTORY OF HERPES ZOSTER KERATOCONJUNCTIVITIS: ICD-10-CM

## 2024-02-09 DIAGNOSIS — E78.5 HYPERLIPIDEMIA LDL GOAL <100: ICD-10-CM

## 2024-02-09 DIAGNOSIS — H04.203 WATERY EYES: ICD-10-CM

## 2024-02-09 LAB
ALBUMIN SERPL BCG-MCNC: 4.2 G/DL (ref 3.5–5.2)
ALP SERPL-CCNC: 63 U/L (ref 40–150)
ALT SERPL W P-5'-P-CCNC: 25 U/L (ref 0–70)
ANION GAP SERPL CALCULATED.3IONS-SCNC: 12 MMOL/L (ref 7–15)
AST SERPL W P-5'-P-CCNC: 25 U/L (ref 0–45)
BASOPHILS # BLD AUTO: 0.1 10E3/UL (ref 0–0.2)
BASOPHILS NFR BLD AUTO: 1 %
BILIRUB SERPL-MCNC: 0.5 MG/DL
BUN SERPL-MCNC: 20 MG/DL (ref 6–20)
CALCIUM SERPL-MCNC: 9.7 MG/DL (ref 8.6–10)
CHLORIDE SERPL-SCNC: 100 MMOL/L (ref 98–107)
CHOLEST SERPL-MCNC: 121 MG/DL
CREAT SERPL-MCNC: 1.22 MG/DL (ref 0.67–1.17)
CREAT UR-MCNC: 190 MG/DL
DEPRECATED HCO3 PLAS-SCNC: 26 MMOL/L (ref 22–29)
EGFRCR SERPLBLD CKD-EPI 2021: 69 ML/MIN/1.73M2
EOSINOPHIL # BLD AUTO: 0.3 10E3/UL (ref 0–0.7)
EOSINOPHIL NFR BLD AUTO: 3 %
ERYTHROCYTE [DISTWIDTH] IN BLOOD BY AUTOMATED COUNT: 14.3 % (ref 10–15)
FASTING STATUS PATIENT QL REPORTED: YES
GLUCOSE SERPL-MCNC: 261 MG/DL (ref 70–99)
HBA1C MFR BLD: 11.8 % (ref 0–5.6)
HCT VFR BLD AUTO: 36.3 % (ref 40–53)
HDLC SERPL-MCNC: 34 MG/DL
HGB BLD-MCNC: 11.9 G/DL (ref 13.3–17.7)
IMM GRANULOCYTES # BLD: 0 10E3/UL
IMM GRANULOCYTES NFR BLD: 0 %
LDLC SERPL CALC-MCNC: 17 MG/DL
LIPASE SERPL-CCNC: 87 U/L (ref 13–60)
LYMPHOCYTES # BLD AUTO: 1.5 10E3/UL (ref 0.8–5.3)
LYMPHOCYTES NFR BLD AUTO: 16 %
MCH RBC QN AUTO: 27.9 PG (ref 26.5–33)
MCHC RBC AUTO-ENTMCNC: 32.8 G/DL (ref 31.5–36.5)
MCV RBC AUTO: 85 FL (ref 78–100)
MICROALBUMIN UR-MCNC: 469 MG/L
MICROALBUMIN/CREAT UR: 246.84 MG/G CR (ref 0–17)
MONOCYTES # BLD AUTO: 0.8 10E3/UL (ref 0–1.3)
MONOCYTES NFR BLD AUTO: 8 %
NEUTROPHILS # BLD AUTO: 7.1 10E3/UL (ref 1.6–8.3)
NEUTROPHILS NFR BLD AUTO: 73 %
NONHDLC SERPL-MCNC: 87 MG/DL
PLATELET # BLD AUTO: 247 10E3/UL (ref 150–450)
POTASSIUM SERPL-SCNC: 3.8 MMOL/L (ref 3.4–5.3)
PROT SERPL-MCNC: 6.9 G/DL (ref 6.4–8.3)
PSA SERPL DL<=0.01 NG/ML-MCNC: 9.71 NG/ML (ref 0–3.5)
RBC # BLD AUTO: 4.26 10E6/UL (ref 4.4–5.9)
SODIUM SERPL-SCNC: 138 MMOL/L (ref 135–145)
TRIGL SERPL-MCNC: 351 MG/DL
WBC # BLD AUTO: 9.7 10E3/UL (ref 4–11)

## 2024-02-09 PROCEDURE — 83690 ASSAY OF LIPASE: CPT | Performed by: FAMILY MEDICINE

## 2024-02-09 PROCEDURE — 85025 COMPLETE CBC W/AUTO DIFF WBC: CPT | Performed by: FAMILY MEDICINE

## 2024-02-09 PROCEDURE — 82043 UR ALBUMIN QUANTITATIVE: CPT | Performed by: FAMILY MEDICINE

## 2024-02-09 PROCEDURE — 99207 PR FOOT EXAM NO CHARGE: CPT | Performed by: FAMILY MEDICINE

## 2024-02-09 PROCEDURE — 92015 DETERMINE REFRACTIVE STATE: CPT | Performed by: OPTOMETRIST

## 2024-02-09 PROCEDURE — 80061 LIPID PANEL: CPT | Performed by: FAMILY MEDICINE

## 2024-02-09 PROCEDURE — 36415 COLL VENOUS BLD VENIPUNCTURE: CPT | Performed by: FAMILY MEDICINE

## 2024-02-09 PROCEDURE — 84153 ASSAY OF PSA TOTAL: CPT | Performed by: FAMILY MEDICINE

## 2024-02-09 PROCEDURE — 82570 ASSAY OF URINE CREATININE: CPT | Performed by: FAMILY MEDICINE

## 2024-02-09 PROCEDURE — 80053 COMPREHEN METABOLIC PANEL: CPT | Performed by: FAMILY MEDICINE

## 2024-02-09 PROCEDURE — 99214 OFFICE O/P EST MOD 30 MIN: CPT | Performed by: FAMILY MEDICINE

## 2024-02-09 PROCEDURE — 92014 COMPRE OPH EXAM EST PT 1/>: CPT | Performed by: OPTOMETRIST

## 2024-02-09 PROCEDURE — 83036 HEMOGLOBIN GLYCOSYLATED A1C: CPT | Performed by: FAMILY MEDICINE

## 2024-02-09 RX ORDER — GLIMEPIRIDE 4 MG/1
8 TABLET ORAL
Qty: 180 TABLET | Refills: 0 | Status: SHIPPED | OUTPATIENT
Start: 2024-02-09 | End: 2024-05-17

## 2024-02-09 RX ORDER — LISINOPRIL AND HYDROCHLOROTHIAZIDE 20; 25 MG/1; MG/1
1 TABLET ORAL DAILY
Qty: 90 TABLET | Refills: 1 | Status: SHIPPED | OUTPATIENT
Start: 2024-02-09 | End: 2024-09-16

## 2024-02-09 RX ORDER — REPAGLINIDE 1 MG/1
TABLET ORAL
Qty: 270 TABLET | Refills: 0 | Status: SHIPPED | OUTPATIENT
Start: 2024-02-09 | End: 2024-06-07

## 2024-02-09 RX ORDER — CARVEDILOL 25 MG/1
TABLET ORAL
Qty: 180 TABLET | Refills: 3 | Status: SHIPPED | OUTPATIENT
Start: 2024-02-09

## 2024-02-09 RX ORDER — ROSUVASTATIN CALCIUM 20 MG/1
20 TABLET, COATED ORAL DAILY
Qty: 90 TABLET | Refills: 1 | Status: SHIPPED | OUTPATIENT
Start: 2024-02-09 | End: 2024-09-16

## 2024-02-09 ASSESSMENT — EXTERNAL EXAM - RIGHT EYE: OD_EXAM: NORMAL

## 2024-02-09 ASSESSMENT — CONF VISUAL FIELD
OS_SUPERIOR_NASAL_RESTRICTION: 0
OS_NORMAL: 1
OS_INFERIOR_NASAL_RESTRICTION: 0
OD_INFERIOR_NASAL_RESTRICTION: 0
OD_SUPERIOR_TEMPORAL_RESTRICTION: 0
OD_NORMAL: 1
METHOD: COUNTING FINGERS
OD_SUPERIOR_NASAL_RESTRICTION: 0
OS_SUPERIOR_TEMPORAL_RESTRICTION: 0
OS_INFERIOR_TEMPORAL_RESTRICTION: 0
OD_INFERIOR_TEMPORAL_RESTRICTION: 0

## 2024-02-09 ASSESSMENT — TONOMETRY
IOP_METHOD: APPLANATION
OS_IOP_MMHG: 13
OD_IOP_MMHG: 13

## 2024-02-09 ASSESSMENT — REFRACTION_MANIFEST
OD_ADD: +2.00
OS_CYLINDER: +0.50
OS_CYLINDER: SPHERE
METHOD_AUTOREFRACTION: 1
OD_CYLINDER: +1.25
OD_AXIS: 075
OS_SPHERE: -0.25
OS_AXIS: 127
OS_SPHERE: -0.25
OD_AXIS: 057
OD_CYLINDER: +4.00
OD_SPHERE: -1.75
OS_ADD: +2.00
OD_SPHERE: -2.50

## 2024-02-09 ASSESSMENT — VISUAL ACUITY
OS_SC: 20/20
OS_SC+: -1
OD_SC+: -2
OD_SC: 20/20-1
OS_SC: 20/60+2
OD_SC: 20/30
METHOD: SNELLEN - LINEAR

## 2024-02-09 ASSESSMENT — SLIT LAMP EXAM - LIDS
COMMENTS: NORMAL
COMMENTS: NORMAL

## 2024-02-09 ASSESSMENT — KERATOMETRY
OS_K2POWER_DIOPTERS: 43.50
OS_AXISANGLE2_DEGREES: 046
OS_K1POWER_DIOPTERS: 43.00
OS_AXISANGLE_DEGREES: 136

## 2024-02-09 ASSESSMENT — CUP TO DISC RATIO
OS_RATIO: 0.3
OD_RATIO: 0.3

## 2024-02-09 ASSESSMENT — EXTERNAL EXAM - LEFT EYE: OS_EXAM: NORMAL

## 2024-02-09 ASSESSMENT — PAIN SCALES - GENERAL: PAINLEVEL: NO PAIN (0)

## 2024-02-09 NOTE — PROGRESS NOTES
Chief Complaint   Patient presents with    Diabetic Eye Exam        Chief Complaint(s) and History of Present Illness(es)       Diabetic Eye Exam              Vision: is stable    Diabetes Type: Type 2 and taking oral medications    Duration: years    Blood Sugars: is uncontrolled (Checks a few times per week )                   Lab Results   Component Value Date    A1C 8.7 04/21/2023    A1C 10.3 08/01/2022    A1C 8.8 01/17/2022    A1C 10.1 06/03/2021    A1C 9.5 12/24/2020    A1C 11.1 07/22/2019    A1C 11.2 12/28/2018    A1C 6.5 08/31/2015       Last Eye Exam: Never had one   Dilated Previously: No, side effects of dilation explained today    What are you currently using to see? +1.50/+1.75 readers - uses to read at night or in low lighting - did not bring with today     Distance Vision Acuity: Satisfied with vision     Near Vision Acuity: Satisfied with vision while reading and using computer with readers     Eye Comfort: stinging occasionally since shingles dx, watery a lot   Do you use eye drops? : No - would like recommendations   Occupation or Hobbies:  - going to [employer] [facility] today for 24th anniversary     Arleen Jiménez  Optometry Assistant     Medical, surgical and family histories reviewed and updated 2/9/2024.       OBJECTIVE: See Ophthalmology exam    ASSESSMENT:    ICD-10-CM    1. 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 History of herpes zoster keratoconjunctivitis - Right Eye  Z86.19       4. Type 2 diabetes mellitus without retinopathy (H)  E11.9       5. Myopia, right  H52.11       6. Regular astigmatism of right eye  H52.221       7. Presbyopia  H52.4           PLAN:    Luther Mariee aware  eye exam results will be sent to Watson Bhagat.  Patient Instructions   Patient educated on importance of good blood sugar control.  Letter sent to primary care provider with diabetic eye exam report.      Begin use of artificial tears 2+ times daily in the right eye. This will help keep the corneal surface well-lubricated.     Glasses prescription declined at this time.   Continue use of OTC reading glasses as needed.     Return in 1 year for a comprehensive eye exam, or sooner if needed.      The effects of the dilating drops last for 4- 6 hours.  You will be more sensitive to light and vision will be blurry up close.  Mydriatic sunglasses were given if needed.     Jean Paul Hirshc, TOREY  [address] [place]  [address]    [phone]

## 2024-02-09 NOTE — PATIENT INSTRUCTIONS
Patient educated on importance of good blood sugar control.  Letter sent to primary care provider with diabetic eye exam report.     Begin use of artificial tears 2+ times daily in the right eye. This will help keep the corneal surface well-lubricated.     Glasses prescription declined at this time.   Continue use of OTC reading glasses as needed.     Return in 1 year for a comprehensive eye exam, or sooner if needed.      The effects of the dilating drops last for 4- 6 hours.  You will be more sensitive to light and vision will be blurry up close.  Mydriatic sunglasses were given if needed.     Jean Paul Hirsch, OD  [address] [place]  [address] [phone]

## 2024-02-09 NOTE — PROGRESS NOTES
Assessment & Plan   Hector is a 57 yo M with htn, hx of AD, hx sbo (ischemic), DM, obesity, AR, elevated psa, S/P AAA  (abdominal aortic aneurysm) repair; here for follow up    Type 2 diabetes mellitus with complication, without long-term current use of insulin (H)   A1c not at goal, non complaint with follow up.  Reviewed the nature of diabetes and its complications.  Went over co morbidities, need for good blood sugar control as well as BP and Cholesterol control. Will try a GLP1; if GLP1 not covered/expensive will consider Insulin and taper off Glimepiride. Discussed the recheck schedule    - HEMOGLOBIN A1C  - Lipid panel reflex to direct LDL Non-fasting  - FOOT EXAM  - Lipase  - Comprehensive metabolic panel (BMP + Alb, Alk Phos, ALT, AST, Total. Bili, TP)  - repaglinide (PRANDIN) 1 MG tablet  Dispense: 270 tablet; Refill: 0  - semaglutide (OZEMPIC) 2 MG/3ML pen  Dispense: 3 mL; Refill: 1  - Miscellaneous Order for DME - ONLY FOR DME  - HEMOGLOBIN A1C  - Lipid panel reflex to direct LDL Non-fasting  - Lipase  - Comprehensive metabolic panel (BMP + Alb, Alk Phos, ALT, AST, Total. Bili, TP)    Type 2 diabetes mellitus with hyperglycemia, without long-term current use of insulin (H)    REfill meds, if GLP1 not covered will consider Insulin and taper off Glimepiride  - glimepiride (AMARYL) 4 MG tablet  Dispense: 180 tablet; Refill: 0  - metFORMIN (GLUCOPHAGE) 500 MG tablet  Dispense: 270 tablet; Refill: 0    Hyperlipidemia LDL goal <100  - rosuvastatin (CRESTOR) 20 MG tablet  Dispense: 90 tablet; Refill: 1    HTN, goal below 140/90  - CBC with platelets and differential  - carvedilol (COREG) 25 MG tablet  Dispense: 180 tablet; Refill: 3  - lisinopril-hydrochlorothiazide (ZESTORETIC) 20-25 MG tablet  Dispense: 90 tablet; Refill: 1  - CBC with platelets and differential    Dissection of aorta, unspecified portion of aorta (H)    S/P AAA  repair    CKD (chronic kidney disease) stage 2, GFR 60-89 ml/min  - Albumin  "Random Urine Quantitative with Creat Ratio  - Albumin Random Urine Quantitative with Creat Ratio    Morbid obesity (H)   Counseled to make better food choices, exercise as tolerated, and lose weight.     Watery eyes   Will try Optivar drops, but will also follow up with ophthalmology    Elevated prostate specific antigen (PSA)   Had follow up 1/26/2021;    Did not follow up in 3 months as was recommended  - PSA, screen  - PSA, screen    Tingling of both feet   Discussed neuropathy and ordered diabetes shoes  - Miscellaneous Order for DME - ONLY FOR DME    Corn or callus(lateral ball feet bilaterallyP   Foot care, with well fitting shoes  - Miscellaneous Order for DME - ONLY FOR DME      BMI  Estimated body mass index is 31.42 kg/m  as calculated from the following:    Height as of this encounter: 1.695 m (5' 6.73\").    Weight as of this encounter: 90.3 kg (199 lb).   Weight management plan: Discussed healthy diet and exercise guidelines      See Patient Instructions    Arielle Young is a 58 year old, presenting for the following health issues:  Recheck Medication      Shingles of the eyes:    Shingles follow up - last seen 12/28/23     Elevated PSA   1/26/2021:Elevated prostate specific antigen (PSA)  Comment: discussed prostate cancer and elevated psa.  Plan: needs prostate biopsy however his recent glycosylated hemoglobin is 9.5 which needs better DM control before biopsy can be done due to high risk of infection.  Recheck psa in 3 months  Has not followed up.        2/9/2024     8:17 AM   Additional Questions   Roomed by TESSY SHOEMAKER   Accompanied by self     History of Present Illness       Hypertension: He presents for follow up of hypertension.  He does check blood pressure  regularly outside of the clinic. Outpatient blood pressures have not been over 140/90. He follows a low salt diet.     He eats 2-3 servings of fruits and vegetables daily.He consumes 0 sweetened beverage(s) daily.He exercises with enough " effort to increase his heart rate 30 to 60 minutes per day.  He exercises with enough effort to increase his heart rate 7 days per week.   He is taking medications regularly.         Diabetes Follow-up  A1c 8.7 (4/23)  Most days blood sugars are fine:around 200  Tingling of feet elida at night  How often are you checking your blood sugar? Not at all  What concerns do you have today about your diabetes? None   Do you have any of these symptoms? (Select all that apply)  Numbness in feet      Hyperlipidemia Follow-Up    Are you regularly taking any medication or supplement to lower your cholesterol?   Yes- Crestor  Are you having muscle aches or other side effects that you think could be caused by your cholesterol lowering medication?  No    Hypertension Follow-up    Do you check your blood pressure regularly outside of the clinic? No   Are you following a low salt diet? Yes  Are your blood pressures ever more than 140 on the top number (systolic) OR more   than 90 on the bottom number (diastolic), for example 140/90? No    BP Readings from Last 2 Encounters:   02/09/24 94/57   12/24/23 129/71     Hemoglobin A1C (%)   Date Value   04/21/2023 8.7 (H)   08/01/2022 10.3 (H)   06/03/2021 10.1 (H)   12/24/2020 9.5 (H)     LDL Cholesterol Calculated (mg/dL)   Date Value   04/21/2023 <5   08/01/2022 <5   06/03/2021     Cannot estimate LDL when triglyceride exceeds 400 mg/dL   12/24/2020     Cannot estimate LDL when triglyceride exceeds 400 mg/dL     LDL Cholesterol Direct (mg/dL)   Date Value   06/03/2021 70   12/24/2020 48       Weight:   Been working consitently  Wt Readings from Last 4 Encounters:   02/09/24 90.3 kg (199 lb)   12/24/23 93 kg (205 lb)   04/21/23 90.9 kg (200 lb 6.4 oz)   08/01/22 92.3 kg (203 lb 6.4 oz)        Review of Systems  Constitutional, HEENT, cardiovascular, pulmonary, gi and gu systems are negative, except as otherwise noted.      Objective    BP 94/57 (BP Location: Left arm, Cuff Size: Adult  "Regular)   Pulse 53   Resp 17   Ht 1.695 m (5' 6.73\")   Wt 90.3 kg (199 lb)   SpO2 96%   BMI 31.42 kg/m    Body mass index is 31.42 kg/m .  Physical Exam   GENERAL: alert and no distress  NECK: no adenopathy, no asymmetry, masses, or scars  RESP: lungs clear to auscultation - no rales, rhonchi or wheezes  CV: regular rate and rhythm, no murmur, click or rub, no peripheral edema  ABDOMEN: soft, nontender, no masses and bowel sounds normal  MS: no gross musculoskeletal defects noted, no edema  NEURO: Normal strength and tone, mentation intact and speech normal  PSYCH: mentation appears normal, affect normal/bright  Diabetic foot exam: normal monofilament exam, except for findings noted on diabetic foot graphical image.          , DP reduced bilateral, PT reduced bilateral, and trophic changes Calluses both forefeet laterally          Signed Electronically by: Watson Bhagat MD    "

## 2024-02-09 NOTE — LETTER
2/9/2024         RE: Luther Mariee  [address] [facility] [address]        Dear Colleague,    Thank you for referring your patient, Luther Mariee, to the [facility]. Please see a copy of my visit note below.    Chief Complaint   Patient presents with     Diabetic Eye Exam        Chief Complaint(s) and History of Present Illness(es)       Diabetic Eye Exam              Vision: is stable    Diabetes Type: Type 2 and taking oral medications    Duration: years    Blood Sugars: is uncontrolled (Checks a few times per week )                   Lab Results   Component Value Date    A1C 8.7 04/21/2023    A1C 10.3 08/01/2022    A1C 8.8 01/17/2022    A1C 10.1 06/03/2021    A1C 9.5 12/24/2020    A1C 11.1 07/22/2019    A1C 11.2 12/28/2018    A1C 6.5 08/31/2015       Last Eye Exam: Never had one   Dilated Previously: No, side effects of dilation explained today    What are you currently using to see? +1.50/+1.75 readers - uses to read at night or in low lighting - did not bring with today     Distance Vision Acuity: Satisfied with vision     Near Vision Acuity: Satisfied with vision while reading and using computer with readers     Eye Comfort: stinging occasionally since shingles dx, watery a lot   Do you use eye drops? : No - would like recommendations   Occupation or Hobbies:  - going to [employer] today for 24th anniversary     Arleen Jiménez  Optometry Assistant     Medical, surgical and family histories reviewed and updated 2/9/2024.       OBJECTIVE: See Ophthalmology exam    ASSESSMENT:    ICD-10-CM    1. 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 History of herpes zoster keratoconjunctivitis - Right Eye  Z86.19       4. Type 2 diabetes mellitus without retinopathy (H)  E11.9       5. Myopia, right  H52.11       6. Regular astigmatism of right eye  H52.221       7. Presbyopia  H52.4            PLAN:    Luther Mariee aware  eye exam results will be sent to Watson Bhagat.  Patient Instructions   Patient educated on importance of good blood sugar control.  Letter sent to primary care provider with diabetic eye exam report.     Begin use of artificial tears 2+ times daily in the right eye. This will help keep the corneal surface well-lubricated.     Glasses prescription declined at this time.   Continue use of OTC reading glasses as needed.     Return in 1 year for a comprehensive eye exam, or sooner if needed.      The effects of the dilating drops last for 4- 6 hours.  You will be more sensitive to light and vision will be blurry up close.  Mydriatic sunglasses were given if needed.     Jean Paul Hirsch OD  [address]. [facility] [place]  [id number] [phone]             Again, thank you for allowing me to participate in the care of your patient.        Sincerely,        Jean Paul Hirsch OD

## 2024-02-11 RX ORDER — AZELASTINE HYDROCHLORIDE 0.5 MG/ML
1 SOLUTION/ DROPS OPHTHALMIC 2 TIMES DAILY
Qty: 6 ML | Refills: 0 | Status: SHIPPED | OUTPATIENT
Start: 2024-02-11 | End: 2024-03-12

## 2024-03-07 ENCOUNTER — TELEPHONE (OUTPATIENT)
Dept: FAMILY MEDICINE | Facility: CLINIC | Age: 59
End: 2024-03-07
Payer: COMMERCIAL

## 2024-03-07 NOTE — TELEPHONE ENCOUNTER
MT referral from: [facility] visit (referral by provider)    MT referral outreach attempt #2 on March 7, 2024 at 2:25 PM      Outcome: Patient not reachable after several attempts, will route to [facility] Pharmacist/Provider as an FYI.  [facility] scheduling number is .  Thank you for the referral.    Use  [employer] for the carrier/Plan on the flowsheet      [employer] Message Sent    See Sammy  [facility]   [phone]

## 2024-03-12 DIAGNOSIS — H04.203 WATERY EYES: ICD-10-CM

## 2024-03-12 RX ORDER — AZELASTINE HYDROCHLORIDE 0.5 MG/ML
SOLUTION/ DROPS OPHTHALMIC
Qty: 18 ML | Refills: 1 | Status: SHIPPED | OUTPATIENT
Start: 2024-03-12

## 2024-03-22 ENCOUNTER — PATIENT OUTREACH (OUTPATIENT)
Dept: CARE COORDINATION | Facility: CLINIC | Age: 59
End: 2024-03-22
Payer: COMMERCIAL

## 2024-04-05 ENCOUNTER — PATIENT OUTREACH (OUTPATIENT)
Dept: CARE COORDINATION | Facility: CLINIC | Age: 59
End: 2024-04-05
Payer: COMMERCIAL

## 2024-05-06 ENCOUNTER — TELEPHONE (OUTPATIENT)
Dept: OPTOMETRY | Facility: CLINIC | Age: 59
End: 2024-05-06

## 2024-05-06 ENCOUNTER — TELEPHONE (OUTPATIENT)
Dept: OPTOMETRY | Facility: CLINIC | Age: 59
End: 2024-05-06
Payer: COMMERCIAL

## 2024-05-06 DIAGNOSIS — H57.89 EYE IRRITATION: ICD-10-CM

## 2024-05-06 DIAGNOSIS — H17.9 RIGHT CORNEA SCAR: Primary | ICD-10-CM

## 2024-05-06 PROCEDURE — 99207 PR NO CHARGE LOS: CPT | Performed by: OPTOMETRIST

## 2024-05-06 NOTE — TELEPHONE ENCOUNTER
[facility] Call Center    Phone Message    May a detailed message be left on voicemail: yes     Reason for Call: Symptoms or Concerns     If patient has red-flag symptoms, warm transfer to triage line    Current symptom or concern: Right eye watery, burning and pressure.    Symptoms have been present for:  On and off since [place].     Has patient previously been seen for this? Yes    By : Dr. Hirsch    Date: 2/2024    Are there any new or worsening symptoms? Yes: Flare.    Action Taken: Message routed to:  [facility] Ophthalmology    Travel Screening: Not Applicable

## 2024-05-06 NOTE — TELEPHONE ENCOUNTER
[facility] Call [facility]    Phone Message    May a detailed message be left on voicemail: yes     Reason for Call: Other: Pt is returning a call from the [facility] regarding his symptoms. States they called him but the call back line isn't working.      Please reach back out to pt and advise    Action Taken: Message routed to:  [facility] eye    Travel Screening: Not Applicable

## 2024-05-12 DIAGNOSIS — E11.65 TYPE 2 DIABETES MELLITUS WITH HYPERGLYCEMIA, WITHOUT LONG-TERM CURRENT USE OF INSULIN (H): ICD-10-CM

## 2024-05-16 ENCOUNTER — MYC MEDICAL ADVICE (OUTPATIENT)
Dept: FAMILY MEDICINE | Facility: CLINIC | Age: 59
End: 2024-05-16
Payer: COMMERCIAL

## 2024-05-16 DIAGNOSIS — E11.65 TYPE 2 DIABETES MELLITUS WITH HYPERGLYCEMIA, WITHOUT LONG-TERM CURRENT USE OF INSULIN (H): ICD-10-CM

## 2024-05-17 RX ORDER — GLIMEPIRIDE 4 MG/1
8 TABLET ORAL
Qty: 180 TABLET | Refills: 1 | Status: SHIPPED | OUTPATIENT
Start: 2024-05-17

## 2024-05-17 NOTE — TELEPHONE ENCOUNTER
Please see refill requests 5/12/24 and 5/16/24. Routing high priority due to patient leaving country.    JOSE GomezN RN  [facility]    
Home/with wife

## 2024-05-17 NOTE — TELEPHONE ENCOUNTER
Please see IntelliDOT message 5/16/24:    Hector Canas [employer] (supporting Watson Bhagat MD)15 hours ago (4:04 PM)     I scheduled a visit for June 28 with Dr. Bhagat.  [place] will be [place] of the [place] until that week.  I have medications on hold pending a doctor visit.  If those could be released this week before I travel that would be helpful.  I am most concerned about the Glimipiride.  Thanks!    Routing to PCP for jade refill until scheduled appointment on 6/28/24 (patient will be out of the country).    Thanks,  Natty Christianson, JOSEN RN  [facility]

## 2024-05-17 NOTE — TELEPHONE ENCOUNTER
Please see [employer] message 5/16/24:    Hector Canas [employer] (supporting Watson Bhagat MD)15 hours ago (4:04 PM)     I scheduled a visit for June 28 with Dr. Bhagat.  I will be out of the country until that week.  I have medications on hold pending a doctor visit.  If those could be released this week before I travel that would be helpful.  I am most concerned about the Glimipiride.  Thanks!    Routing to PCP for jade refill until scheduled appointment on 6/28/24 (patient will be out of the country).    Thanks,  Natty Christianson, JOSEN RN  [facility]

## 2024-06-07 DIAGNOSIS — E11.8 TYPE 2 DIABETES MELLITUS WITH COMPLICATION, WITHOUT LONG-TERM CURRENT USE OF INSULIN (H): ICD-10-CM

## 2024-06-07 RX ORDER — REPAGLINIDE 1 MG/1
TABLET ORAL
Qty: 270 TABLET | Refills: 0 | Status: SHIPPED | OUTPATIENT
Start: 2024-06-07 | End: 2024-09-23

## 2024-06-28 ENCOUNTER — OFFICE VISIT (OUTPATIENT)
Dept: FAMILY MEDICINE | Facility: CLINIC | Age: 59
End: 2024-06-28
Payer: COMMERCIAL

## 2024-06-28 VITALS
SYSTOLIC BLOOD PRESSURE: 128 MMHG | WEIGHT: 199.36 LBS | TEMPERATURE: 96.9 F | BODY MASS INDEX: 32.04 KG/M2 | RESPIRATION RATE: 16 BRPM | HEART RATE: 50 BPM | HEIGHT: 66 IN | OXYGEN SATURATION: 97 % | DIASTOLIC BLOOD PRESSURE: 65 MMHG

## 2024-06-28 DIAGNOSIS — L84 CALLUS OF FOOT: ICD-10-CM

## 2024-06-28 DIAGNOSIS — R01.1 HEART MURMUR: ICD-10-CM

## 2024-06-28 DIAGNOSIS — E78.5 HYPERLIPIDEMIA LDL GOAL <100: ICD-10-CM

## 2024-06-28 DIAGNOSIS — R97.20 ELEVATED PROSTATE SPECIFIC ANTIGEN (PSA): ICD-10-CM

## 2024-06-28 DIAGNOSIS — E66.01 MORBID OBESITY (H): ICD-10-CM

## 2024-06-28 DIAGNOSIS — E11.8 TYPE 2 DIABETES MELLITUS WITH COMPLICATION, WITHOUT LONG-TERM CURRENT USE OF INSULIN (H): Primary | ICD-10-CM

## 2024-06-28 LAB
CHOLEST SERPL-MCNC: 212 MG/DL
FASTING STATUS PATIENT QL REPORTED: ABNORMAL
HBA1C MFR BLD: 10.7 % (ref 0–5.6)
HDLC SERPL-MCNC: 39 MG/DL
LDLC SERPL CALC-MCNC: ABNORMAL MG/DL
LDLC SERPL DIRECT ASSAY-MCNC: 93 MG/DL
NONHDLC SERPL-MCNC: 173 MG/DL
PSA SERPL DL<=0.01 NG/ML-MCNC: 10.1 NG/ML (ref 0–3.5)
TRIGL SERPL-MCNC: 465 MG/DL

## 2024-06-28 PROCEDURE — G0103 PSA SCREENING: HCPCS | Performed by: FAMILY MEDICINE

## 2024-06-28 PROCEDURE — 36415 COLL VENOUS BLD VENIPUNCTURE: CPT | Performed by: FAMILY MEDICINE

## 2024-06-28 PROCEDURE — 99214 OFFICE O/P EST MOD 30 MIN: CPT | Performed by: FAMILY MEDICINE

## 2024-06-28 PROCEDURE — 83036 HEMOGLOBIN GLYCOSYLATED A1C: CPT | Performed by: FAMILY MEDICINE

## 2024-06-28 PROCEDURE — 83721 ASSAY OF BLOOD LIPOPROTEIN: CPT | Mod: 59 | Performed by: FAMILY MEDICINE

## 2024-06-28 PROCEDURE — 80061 LIPID PANEL: CPT | Performed by: FAMILY MEDICINE

## 2024-06-28 ASSESSMENT — PAIN SCALES - GENERAL: PAINLEVEL: NO PAIN (0)

## 2024-06-28 NOTE — PROGRESS NOTES
Assessment & Plan     Type 2 diabetes mellitus with complication, without long-term current use of insulin (H)   Last A1c not at goal.Reviewed the nature of diabetes and its complications.  Went over co morbidities, need for good blood sugar control as well as BP and Cholesterol control. Will add Ozempiic that might help with weight loss as well, also discussed common side effects including abdominal pain and nausea.    - HEMOGLOBIN A1C  - Lipid panel reflex to direct LDL Non-fasting  - semaglutide (OZEMPIC) 2 MG/3ML pen  Dispense: 3 mL; Refill: 0  - HEMOGLOBIN A1C  - Lipid panel reflex to direct LDL Non-fasting  - LDL cholesterol direct    Morbid obesity (H)   -  Counseled to make better food choices, exercise as tolerated, and lose weight. Will try Ozempic  - semaglutide (OZEMPIC) 2 MG/3ML pen  Dispense: 3 mL; Refill: 0    Hyperlipidemia LDL goal <100   -  Lipid panel reflex to direct LDL Non-fasting    Elevated prostate specific antigen (PSA)   Has had elevated PSA; saw urology once, his AUA Symptom Score was:  low, follow up PSA checks  recommended, noted to be trending up, resnt to urology 4/2023 but did not follow up. Discussed labs will recheck and have him follow up with urology  - PSA, screen  - PSA, screen    Heart murmur    -   Aneurysm of ascending aorta without rupture; S/P aortic aneurysm repair       Following up with vascular.    Callus of foot   -  discussed foot care and well fitting shoes.    See Patient Instructions    Arielle Young is a 58 year old, presenting for the following health issues:  Follow Up  (Last seen 2/9/2024)    Weight: BMI 32.04  Wt Readings from Last 4 Encounters:   06/28/24 90.4 kg (199 lb 5.8 oz)   02/09/24 90.3 kg (199 lb)   12/24/23 93 kg (205 lb)   04/21/23 90.9 kg (200 lb 6.4 oz)      Working out but not losing     Vision:   Having a hard time driving and sun; irritates eyes, was told has dry eyes (Rt side). Had shingles in the same eye.      Aneurysm of ascending  aorta without rupture; S/P  aortic aneurysm repair; .      CT ANGIO 12/23   IMPRESSION:  1. CT spatial resolution is superior to MR.     2. Proximal descending aorta, including the true and false lumen,  measures 56 mm in diameter, unchanged from 2021 when measured in a  similar fashion.     3. Dissection flap is difficult to follow. Dissection extends from the  superior graft anastomosis through the great arteries and visualized  abdominal aorta.      6/28/2024     7:52 AM   Additional Questions   Roomed by TESSY SHOEMAKER   Accompanied by self     History of Present Illness       Diabetes:   He presents for follow up of diabetes.  He is checking home blood glucose a few times a month.   He checks blood glucose before meals.  Blood glucose is sometimes over 200 and never under 70.  When his blood glucose is low, the patient is asymptomatic for confusion, blurred vision, lethargy and reports not feeling dizzy, shaky, or weak.   He has no concerns regarding his diabetes at this time.  He is having numbness in feet.            He eats 4 or more servings of fruits and vegetables daily.He consumes 0 sweetened beverage(s) daily.He exercises with enough effort to increase his heart rate 30 to 60 minutes per day.  He exercises with enough effort to increase his heart rate 6 days per week.   He is taking medications regularly.         Diabetes Follow-up    How often are you checking your blood sugar? A few times a week  What time of day are you checking your blood sugars (select all that apply)?  Before and after meals  Have you had any blood sugars above 200?  occasionally  Have you had any blood sugars below 70?  No  What symptoms do you notice when your blood sugar is low?  None  What concerns do you have today about your diabetes? None   Do you have any of these symptoms? (Select all that apply)  Numbness in feet          Hyperlipidemia Follow-Up    Are you regularly taking any medication or supplement to lower your cholesterol?  "  Yes- .  Are you having muscle aches or other side effects that you think could be caused by your cholesterol lowering medication?  No    Hypertension Follow-up    Do you check your blood pressure regularly outside of the clinic? No   Are you following a low salt diet? Yes  Are your blood pressures ever more than 140 on the top number (systolic) OR more   than 90 on the bottom number (diastolic), for example 140/90? No    BP Readings from Last 2 Encounters:   06/28/24 128/65   02/09/24 94/57     Hemoglobin A1C (%)   Date Value   06/28/2024 10.7 (H)   02/09/2024 11.8 (H)   06/03/2021 10.1 (H)   12/24/2020 9.5 (H)     LDL Cholesterol Calculated   Date Value   06/28/2024      Comment:     Cannot estimate LDL when triglyceride exceeds 400 mg/dL   02/09/2024 17 mg/dL   06/03/2021     Cannot estimate LDL when triglyceride exceeds 400 mg/dL mg/dL   12/24/2020     Cannot estimate LDL when triglyceride exceeds 400 mg/dL mg/dL     LDL Cholesterol Direct (mg/dL)   Date Value   06/28/2024 93   06/03/2021 70   12/24/2020 48     Review of Systems  Constitutional, HEENT, cardiovascular, pulmonary, gi and gu systems are negative, except as otherwise noted.      Objective    /65 (BP Location: Right arm, Cuff Size: Adult Regular)   Pulse 50   Temp 96.9  F (36.1  C) (Temporal)   Resp 16   Ht 1.68 m (5' 6.14\")   Wt 90.4 kg (199 lb 5.8 oz)   SpO2 97%   BMI 32.04 kg/m    Body mass index is 32.04 kg/m .  Physical Exam   GENERAL: alert and no distress  NECK: no adenopathy, no asymmetry, masses, or scars  RESP: lungs clear to auscultation - no rales, rhonchi or wheezes  CV: regular rate and rhythm, systolic murmur, click or rub, no peripheral edema  ABDOMEN: soft, nontender, no hepatosplenomegaly, no masses and bowel sounds normal  MS: no gross musculoskeletal defects noted, no edema  Diabetic foot exam: normal DP and PT pulses, no trophic changes or ulcerative lesions, and normal sensory exam    Results for orders placed or " performed in visit on 06/28/24   HEMOGLOBIN A1C     Status: Abnormal   Result Value Ref Range    Hemoglobin A1C 10.7 (H) 0.0 - 5.6 %   Lipid panel reflex to direct LDL Non-fasting     Status: Abnormal   Result Value Ref Range    Cholesterol 212 (H) <200 mg/dL    Triglycerides 465 (H) <150 mg/dL    Direct Measure HDL 39 (L) >=40 mg/dL    LDL Cholesterol Calculated      Non HDL Cholesterol 173 (H) <130 mg/dL    Patient Fasting > 8hrs? Unknown     Narrative    Cholesterol  Desirable:  <200 mg/dL    Triglycerides  Normal:  Less than 150 mg/dL  Borderline High:  150-199 mg/dL  High:  200-499 mg/dL  Very High:  Greater than or equal to 500 mg/dL    Direct Measure HDL  Female:  Greater than or equal to 50 mg/dL   Male:  Greater than or equal to 40 mg/dL    LDL Cholesterol  Desirable:  <100mg/dL  Above Desirable:  100-129 mg/dL   Borderline High:  130-159 mg/dL   High:  160-189 mg/dL   Very High:  >= 190 mg/dL    Non HDL Cholesterol  Desirable:  130 mg/dL  Above Desirable:  130-159 mg/dL  Borderline High:  160-189 mg/dL  High:  190-219 mg/dL  Very High:  Greater than or equal to 220 mg/dL   PSA, screen     Status: Abnormal   Result Value Ref Range    Prostate Specific Antigen Screen 10.10 (H) 0.00 - 3.50 ng/mL    Narrative    This result is obtained using the Roche Elecsys total PSA method on the [id number] immunoassay analyzer. Results obtained with different assay methods or kits cannot be used interchangeably.   LDL cholesterol direct     Status: Normal   Result Value Ref Range    LDL Cholesterol Direct 93 <100 mg/dL       Signed Electronically by: Watson Bhagat MD

## 2024-06-29 ENCOUNTER — HEALTH MAINTENANCE LETTER (OUTPATIENT)
Age: 59
End: 2024-06-29

## 2024-07-03 ENCOUNTER — MYC MEDICAL ADVICE (OUTPATIENT)
Dept: FAMILY MEDICINE | Facility: CLINIC | Age: 59
End: 2024-07-03
Payer: COMMERCIAL

## 2024-07-03 NOTE — TELEPHONE ENCOUNTER
[employer] message sent to patient.     Thanks,  PAULA Ackerman  [facility]

## 2024-09-02 DIAGNOSIS — E11.65 TYPE 2 DIABETES MELLITUS WITH HYPERGLYCEMIA, WITHOUT LONG-TERM CURRENT USE OF INSULIN (H): ICD-10-CM

## 2024-09-15 DIAGNOSIS — I10 HTN, GOAL BELOW 140/90: ICD-10-CM

## 2024-09-15 DIAGNOSIS — E78.5 HYPERLIPIDEMIA LDL GOAL <100: ICD-10-CM

## 2024-09-16 RX ORDER — ROSUVASTATIN CALCIUM 20 MG/1
20 TABLET, COATED ORAL DAILY
Qty: 90 TABLET | Refills: 2 | Status: SHIPPED | OUTPATIENT
Start: 2024-09-16

## 2024-09-16 RX ORDER — LISINOPRIL AND HYDROCHLOROTHIAZIDE 20; 25 MG/1; MG/1
1 TABLET ORAL DAILY
Qty: 90 TABLET | Refills: 2 | Status: SHIPPED | OUTPATIENT
Start: 2024-09-16

## 2024-09-20 ENCOUNTER — TELEPHONE (OUTPATIENT)
Dept: FAMILY MEDICINE | Facility: CLINIC | Age: 59
End: 2024-09-20
Payer: COMMERCIAL

## 2024-09-20 NOTE — TELEPHONE ENCOUNTER
Patient Quality Outreach    Patient is due for the following:   Diabetes -  A1C  Physical Preventive Adult Physical    Next Steps:   Schedule a Adult Preventative    Type of outreach:    Sent HSTYLE message.      Questions for provider review:    None           JACKIE ADORNO, CMA

## 2024-09-23 DIAGNOSIS — E11.8 TYPE 2 DIABETES MELLITUS WITH COMPLICATION, WITHOUT LONG-TERM CURRENT USE OF INSULIN (H): ICD-10-CM

## 2024-09-23 RX ORDER — REPAGLINIDE 1 MG/1
TABLET ORAL
Qty: 270 TABLET | Refills: 0 | Status: SHIPPED | OUTPATIENT
Start: 2024-09-23

## 2024-10-18 ENCOUNTER — PATIENT OUTREACH (OUTPATIENT)
Dept: CARE COORDINATION | Facility: CLINIC | Age: 59
End: 2024-10-18
Payer: COMMERCIAL

## 2024-10-30 ENCOUNTER — MYC MEDICAL ADVICE (OUTPATIENT)
Dept: FAMILY MEDICINE | Facility: CLINIC | Age: 59
End: 2024-10-30
Payer: COMMERCIAL

## 2024-10-30 NOTE — TELEPHONE ENCOUNTER
Please see Advise Onlyt message. Should patient be referred to specialist?    JOSE SortoN PAULA  [facility]

## 2024-11-01 ENCOUNTER — ANCILLARY PROCEDURE (OUTPATIENT)
Dept: GENERAL RADIOLOGY | Facility: CLINIC | Age: 59
End: 2024-11-01
Attending: FAMILY MEDICINE
Payer: COMMERCIAL

## 2024-11-01 ENCOUNTER — OFFICE VISIT (OUTPATIENT)
Dept: FAMILY MEDICINE | Facility: CLINIC | Age: 59
End: 2024-11-01
Payer: COMMERCIAL

## 2024-11-01 VITALS
SYSTOLIC BLOOD PRESSURE: 142 MMHG | WEIGHT: 203.4 LBS | BODY MASS INDEX: 34.72 KG/M2 | HEART RATE: 58 BPM | OXYGEN SATURATION: 96 % | HEIGHT: 64 IN | DIASTOLIC BLOOD PRESSURE: 55 MMHG | RESPIRATION RATE: 20 BRPM | TEMPERATURE: 97.1 F

## 2024-11-01 DIAGNOSIS — M25.551 HIP PAIN, RIGHT: Primary | ICD-10-CM

## 2024-11-01 DIAGNOSIS — M70.61 TROCHANTERIC BURSITIS OF RIGHT HIP: ICD-10-CM

## 2024-11-01 DIAGNOSIS — G89.29 CHRONIC PAIN OF LEFT ANKLE: ICD-10-CM

## 2024-11-01 DIAGNOSIS — M25.572 CHRONIC PAIN OF LEFT ANKLE: ICD-10-CM

## 2024-11-01 PROCEDURE — 99213 OFFICE O/P EST LOW 20 MIN: CPT | Performed by: FAMILY MEDICINE

## 2024-11-01 PROCEDURE — 73502 X-RAY EXAM HIP UNI 2-3 VIEWS: CPT | Mod: TC | Performed by: STUDENT IN AN ORGANIZED HEALTH CARE EDUCATION/TRAINING PROGRAM

## 2024-11-01 ASSESSMENT — PAIN SCALES - GENERAL: PAINLEVEL_OUTOF10: MODERATE PAIN (5)

## 2024-11-01 NOTE — PROGRESS NOTES
"      Arielle Young is a 59 year old, presenting for the following health issues:  Foot Pain      11/1/2024     8:27 AM   Additional Questions   Roomed by Vladimir     History of Present Illness       Reason for visit:  Foot concern  Symptom onset:  3-7 days ago  Symptoms include:  Pain in hip  Symptom intensity:  Moderate  Symptom progression:  Improving  Had these symptoms before:  No  What makes it worse:  Walking and sleeping  What makes it better:  No   He is taking medications regularly.        Left foot problems since 1991    Overdid it walking around [place]    Still bothering    Hip and knee pain also    Tried arches from [employer] store and initially good but a day later real bad              Objective    BP (!) 176/71 (BP Location: Right arm, Patient Position: Sitting, Cuff Size: Adult Large)   Pulse 58   Temp 97.1  F (36.2  C) (Temporal)   Resp 20   Ht 1.629 m (5' 4.14\")   Wt 92.3 kg (203 lb 6.4 oz)   SpO2 96%   BMI 34.76 kg/m    Body mass index is 34.76 kg/m .  Physical Exam      Full physical not done     Mentation and affect are fine    No tremor of speech or extremity    Good sensation and strength in both lower extremities    He is tender right trochanter area    Some pain and limited range of motion int/ ext rotation right hip compared to left    Knee range of motion fine    No point tenderness over feet/ ankles    Color of feet/ toes okay    He points to lateral left ankle and dorsum of left foot when asked where pain is    ASSESSMENT / PLAN:  (E35.531) Hip pain, right  (primary encounter diagnosis)  Comment: xray done, only mild OA.    Plan: XR Hip Right 2-3 Views             (M70.61) Trochanteric bursitis of right hip  Comment: patient should see sports med for this, referral done.   Plan: Orthopedic  Referral             (M25.572,  G89.29) Chronic pain of left ankle  Comment: patient has seen podiatry in past but should be seen again.  He will call and schedule with provider of " choice.   Plan: Orthopedic  Referral               I reviewed the patient's medications, allergies, medical history, family history, and social history.    Mahesh Cantrell MD            Signed Electronically by: Mahesh Cantrell MD

## 2024-11-01 NOTE — PATIENT INSTRUCTIONS
See podiatry for ankle    Could see sports med for the bursitis    Try over the counter diclofenac cream/ gel to affected area as needed

## 2024-11-02 NOTE — RESULT ENCOUNTER NOTE
Here is the radiology report.  See sports medicine and they can look at the xray and do a more detailed physical exam of the hip area.  Also see podiatry for the ankle.    Mahesh Cantrell MD

## 2024-11-04 ENCOUNTER — PATIENT OUTREACH (OUTPATIENT)
Dept: CARE COORDINATION | Facility: CLINIC | Age: 59
End: 2024-11-04
Payer: COMMERCIAL

## 2024-11-06 ENCOUNTER — PATIENT OUTREACH (OUTPATIENT)
Dept: CARE COORDINATION | Facility: CLINIC | Age: 59
End: 2024-11-06
Payer: COMMERCIAL

## 2024-11-14 DIAGNOSIS — E11.65 TYPE 2 DIABETES MELLITUS WITH HYPERGLYCEMIA, WITHOUT LONG-TERM CURRENT USE OF INSULIN (H): ICD-10-CM

## 2024-11-15 RX ORDER — GLIMEPIRIDE 4 MG/1
8 TABLET ORAL
Qty: 180 TABLET | Refills: 1 | Status: SHIPPED | OUTPATIENT
Start: 2024-11-15

## 2024-11-16 ENCOUNTER — HEALTH MAINTENANCE LETTER (OUTPATIENT)
Age: 59
End: 2024-11-16

## 2024-11-19 NOTE — PROGRESS NOTES
"Chief Complaint:   Chief Complaint   Patient presents with    Right Hip - Pain     Onset: a few years, nothing concerning. He recently got new foot arches from the store and within 24 hours he was debilitated. Pain is on the outside of the hip. Denies any radiating pain. No tx. Pain increases with stairs, especially going up stairs. The pain has gotten better since he took the arches out of his shoes.         Luther Mariee is seen today in the [facility] for evaluation of right hip pain at the request of Dr. Mahesh Cantrell         HISTORY OF PRESENT ILLNESS    Luther Mariee is a 59 year old male seen for evaluation of ongoing right hip pain with no known injury.   Pain has been present for a few years. He recently got new shoe arches from the store and within 24 hours he was \"debilitated\", bad hip and knee pain. Couldn't even get out of bed. He locates pain along the outer hip without radiation. Pain increased with stairs, especially going up stairs. The pain has improved since he took the new shoe arches out. Denies groin pain    Ok at rest, night. Ok with flat walking. Most pain really with stairs.    With standing, both \"hip areas\" go numb.    Perceived limb length inequality: No    Patient is diabetic, hgb A1c= 10.7 on 6/28/2024 (was 11.8 on 2/9/2024).    Aortic dissection with AAA.    Denies low back pain.    Present symptoms: moderate pain.    Pain severity: 5/10  Pain quality: sharp  Frequency of symptoms: frequently  Exacerbating Factors: stairs, [id number], in/out of car  Relieving Factors: avoiding stairs  Night Pain:  occasional  Pain while at rest: No   Associated numbness or tingling: Yes     Patient has tried:     NSAIDS:  ibuprofen       Acetaminophen: No     Opioids: No     Physical Therapy: No      Home exercise program: Yes      Activity modification: Yes       Assistive device:  No     Injections: No       Ice: No      Heat: No      Topicals: No "     Other PMH:  has a past medical history of Acute renal failure (H) (01/01/2010), Aortic dissection (H) (02/23/2010), Ascending aortic aneurysm (H), Bicuspid aortic valve, BMI 30.0-30.9,adult, Diabetes (H), HTN (hypertension), Ischemic colitis (H) (01/01/2010), SBO (small bowel obstruction) (H) (01/01/2010), 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  Patient Active Problem List   Diagnosis    Aortic dissection (H)    Splenic infarct    Hypertension goal BP (blood pressure) < 130/80    Ascending aortic aneurysm (H)    SBO (small bowel obstruction) (H)    Ischemic colitis (H)    Bicuspid aortic valve    Morbid obesity (H)    Vitamin B12 deficiency    Vitamin D deficiency    Chronic pain    Hyperlipidemia LDL goal <100    H/O aortic dissection    CKD (chronic kidney disease) stage 2, GFR 60-89 ml/min    Diabetes mellitus, type 2 (H)    Aortic valve disorder    Calculus of gallbladder    Carotid dissection, bilateral (H)    Type 2 diabetes mellitus with complication, without long-term current use of insulin (H)    Callus of foot    Heart murmur       Surgical Hx:  has a past surgical history that includes cholecystectomy, open; endovas repair, infrarenl abdom aortic aneurysm/dissect; jllvc-wbo-mmnra/aorto-unifemoral prosthesis (2/10); small bowel resection (2010); colonoscopy (2010); and Colonoscopy (N/A, 4/12/2023).    Medications:   Current Outpatient Medications:     alcohol swab prep pads, Use to swab area of injection/kaylie as directed., Disp: 100 each, Rfl: 3    alcohol swab prep pads, Use to swab area of injection/kaylie as directed., Disp: 100 each, Rfl: 3    aspirin 81 MG tablet, Take 1 tablet by mouth daily., Disp: , Rfl:     azelastine (OPTIVAR) 0.05 % ophthalmic solution, APPLY 1 DROP TO AFFECTED EYE(S) 2 TIMES DAILY., Disp: 18 mL, Rfl: 1    blood glucose (NO BRAND SPECIFIED) test strip, Use to test blood sugar one times daily or as directed. To accompany: Blood  Glucose Monitor Brands: per insurance., Disp: 100 strip, Rfl: 6    blood glucose (NO BRAND SPECIFIED) test strip, Use to test blood sugar 3 times daily or as directed. To accompany: Blood Glucose Monitor Brands: per insurance., Disp: 100 strip, Rfl: 6    blood glucose calibration (NO BRAND SPECIFIED) solution, To accompany: Blood Glucose Monitor Brands: per insurance., Disp: 1 each, Rfl: 0    blood glucose calibration (NO BRAND SPECIFIED) solution, To accompany: Blood Glucose Monitor Brands: per insurance., Disp: 1 Bottle, Rfl: 3    blood glucose monitoring (NO BRAND SPECIFIED) meter device kit, Use to test blood sugar once times daily or as directed. Preferred blood glucose meter OR supplies to accompany: Blood Glucose Monitor Brands: per insurance., Disp: 1 kit, Rfl: 0    blood glucose monitoring (NO BRAND SPECIFIED) meter device kit, Use to test blood sugar 3 times daily or as directed. Preferred blood glucose meter OR supplies to accompany: Blood Glucose Monitor Brands: per insurance., Disp: 1 kit, Rfl: 0    carvedilol (COREG) 25 MG tablet, TAKE 1 TABLET BY MOUTH TWICE A DAY WITH MEALS, Disp: 180 tablet, Rfl: 3    glimepiride (AMARYL) 4 MG tablet, TAKE 2 TABLETS (8 MG) BY MOUTH EVERY MORNING (BEFORE BREAKFAST), Disp: 180 tablet, Rfl: 1    ibuprofen (ADVIL/MOTRIN) 200 MG tablet, Take 800 mg by mouth., Disp: , Rfl:     lisinopril-hydrochlorothiazide (ZESTORETIC) 20-25 MG tablet, TAKE 1 TABLET BY MOUTH EVERY DAY, Disp: 90 tablet, Rfl: 2    metFORMIN (GLUCOPHAGE) 500 MG tablet, TAKE 1 TABLET (500 MG) BY MOUTH 3 TIMES DAILY (WITH MEALS), Disp: 270 tablet, Rfl: 0    mometasone (ELOCON) 0.1 % external ointment, Apply sparingly to affected area twice daily for 14 days.  Do not apply to face., Disp: 15 g, Rfl: 2    repaglinide (PRANDIN) 1 MG tablet, TAKE 1 TABLET (1 MG) BY MOUTH 3 TIMES DAILY BEFORE MEALS, Disp: 270 tablet, Rfl: 0    rosuvastatin (CRESTOR) 20 MG tablet, TAKE 1 TABLET BY MOUTH EVERY DAY, Disp: 90 tablet,  "Rfl: 2    semaglutide (OZEMPIC) 2 MG/3ML pen, Inject 0.25 mg Subcutaneous every 7 days, Disp: 3 mL, Rfl: 0    thin (NO BRAND SPECIFIED) lancets, Use with lanceting device. To accompany: Blood Glucose Monitor Brands: per insurance., Disp: 100 each, Rfl: 6    thin (NO BRAND SPECIFIED) lancets, Use with lanceting device. To accompany: Blood Glucose Monitor Brands: per insurance., Disp: 100 each, Rfl: 6    Allergies:   Allergies   Allergen Reactions    Seasonal Allergies        Social Hx:  for school districts (mostly sitting).  reports that he quit smoking about 22 years ago. His smoking use included cigarettes. He has never used smokeless tobacco. He reports that he does not drink alcohol and does not use drugs.    Family Hx: family history includes Unknown/Adopted in his father, maternal grandfather, maternal grandmother, mother, paternal grandfather, and paternal grandmother.    REVIEW OF SYSTEMS:  10 point ROS neg other than the symptoms noted above in the HPI and PAST MEDICAL HISTORY. Notables,  CONSTITUTIONAL:NEGATIVE for fever, chills, change in weight  INTEGUMENTARY/SKIN: NEGATIVE for worrisome rashes, moles or lesions  MUSCULOSKELETAL:See HPI above  NEURO: NEGATIVE for weakness, dizziness or paresthesias    PHYSICAL EXAM:  BP (!) 145/72   Pulse 58   Ht 1.629 m (5' 4.14\")   Wt 91.4 kg (201 lb 6.4 oz)   BMI 34.42 kg/m     GENERAL APPEARANCE: healthy, alert, no distress  SKIN: no suspicious lesions or rashes  NEURO: Normal strength and tone, mentation intact and speech normal  PSYCH:  mentation appears normal and affect normal  RESPIRATORY: No increased work of breathing.  LYMPH: no palpable inguinal lymph nodes.    BILATERAL LOWER EXTREMITIES:  Gait: normal   varus alignment.  No gross deformities or masses.  Bilateral Quad atrophy, strength weaj  Intact sensation deep peroneal nerve, superficial peroneal nerve, med/lat tibial nerve, sural nerve, saphenous nerve  Intact EHL, EDL, TA, " FHL, GS, quadriceps hamstrings and hip flexors   Bilateral calf soft and nttp or squeeze.  DTRs: achilles 2+, patella 2+.  Edema: trace  Leg lengths: grossly symmetric at medial malleolus.      BACK EXAM  Lumbar range of motion: flexion to touch distal shin causes pulling in hamstrings , extension adequate, side bend: adequate; pain not reproduced.  Squat: positive  Seated SLR: negative , bilateral.  Supine SLR: negative , bilateral.  Sensation:normal, bilateral.   Sciatic Notch tenderness: negative    LEFT HIP EXAM:      Palpation: Tender:   none   Strength:  4+/5  Special tests:  Irritability (flexion/adduction/internal rotation) equivocal.  Hip range of motion: Internal rotation: 10, External rotation: 50, Flexion 100      RIGHT HIP EXAM:    Palpation: Tender:   right greater trochanter, right gluteus medius, right iliotibial band.   Nontender: groin  Strength:  4/5  Special tests:  Irritability (flexion/adduction/internal rotation) mild positive.  Hip range of motion: Internal rotation: 10, External rotation: 50, Flexion 100        X-RAY: AP pelvis 11/22/2024 and AP/Lateral views right hip from 11/1/2024 were reviewed in clinic today. No obvious fractures or dislocations. Moderate right > left hip joint space narrowing and formation of marginal osteophytes. Normal alignment. Cortical thickening along the lateral aspect of the proximal right femoral diaphysis. This is nonspecific but can be seen with stress injury especially if patient is on bisphosphonates. No discrete fracture line. Surgical clips project over the right groin.         Impression: 60yo male with chronic right hip pain, primary osteoarthritis, greater trochanteric bursitis.    Plan:   Exam, images reviewed  Xrays with bilateral hip os, more on right than left.  Also has right trochanteric burisitis.  Could all have been aggravated with recent shoe inserts, as improved with stopping wearing shoe inserts  Treatment options with therapy, home  exercise program, ice/heat, iliotibial band stretching, core exercises, over the counter medications, topicals, injections (bursal or intra-articular)  At this time, he'd like to try a right trochanteric bursal injection today. See procedure note below  Consider intra-articular injection in future with sports medicine, image-guided.  Return to clinic as needed.      * All questions were addressed and answered prior to discharge from clinic today. The patient acknowledges an understanding of and agreement with the plan set forth during today's visit. Patient was advised to call our office or MyChart us if any further questions arise upon leaving our office today.        Needs better diabetic control.      Donald Cano M.D., M.S.  Dept. of Orthopaedic Surgery  [facility]       Large Joint Injection/Arthocentesis: R greater trochanteric bursa    Date/Time: 11/22/2024 10:11 AM    Performed by: Donald Cano MD  Authorized by: Donald Cano MD    Indications:  Pain  Needle Size:  22 G  Guidance: landmark guided    Approach:  Lateral  Location:  Hip      Site:  R greater trochanteric bursa  Medications:  4 mL BUPivacaine 0.25 %; 80 mg methylPREDNISolone 80 MG/ML  Outcome:  Tolerated well, no immediate complications  Procedure discussed: discussed risks, benefits, and alternatives    Consent Given by:  Patient  Timeout: timeout called immediately prior to procedure    Prep: patient was prepped and draped in usual sterile fashion

## 2024-11-21 ASSESSMENT — HOOS JR
GOING UP OR DOWN STAIRS: MODERATE
RISING FROM SITTING: MODERATE
LYING IN BED (TURNING OVER, MAINTAINING HIP POSITION): MILD
BENDING TO THE FLOOR TO PICK UP OBJECT: SEVERE
SITTING: MILD
HOOS JR TOTAL INTERVAL SCORE: 55.99
WALKING ON UNEVEN SURFACE: MODERATE

## 2024-11-22 ENCOUNTER — TELEPHONE (OUTPATIENT)
Dept: SURGERY | Facility: CLINIC | Age: 59
End: 2024-11-22

## 2024-11-22 ENCOUNTER — ANCILLARY PROCEDURE (OUTPATIENT)
Dept: GENERAL RADIOLOGY | Facility: CLINIC | Age: 59
End: 2024-11-22
Attending: ORTHOPAEDIC SURGERY
Payer: COMMERCIAL

## 2024-11-22 ENCOUNTER — OFFICE VISIT (OUTPATIENT)
Dept: ORTHOPEDICS | Facility: CLINIC | Age: 59
End: 2024-11-22
Attending: FAMILY MEDICINE
Payer: COMMERCIAL

## 2024-11-22 VITALS
HEART RATE: 58 BPM | HEIGHT: 64 IN | WEIGHT: 201.4 LBS | DIASTOLIC BLOOD PRESSURE: 72 MMHG | SYSTOLIC BLOOD PRESSURE: 145 MMHG | BODY MASS INDEX: 34.38 KG/M2

## 2024-11-22 DIAGNOSIS — M25.551 CHRONIC HIP PAIN, RIGHT: Primary | ICD-10-CM

## 2024-11-22 DIAGNOSIS — M25.551 RIGHT HIP PAIN: ICD-10-CM

## 2024-11-22 DIAGNOSIS — M70.61 TROCHANTERIC BURSITIS OF RIGHT HIP: ICD-10-CM

## 2024-11-22 DIAGNOSIS — G89.29 CHRONIC HIP PAIN, RIGHT: Primary | ICD-10-CM

## 2024-11-22 DIAGNOSIS — M16.11 PRIMARY OSTEOARTHRITIS OF RIGHT HIP: ICD-10-CM

## 2024-11-22 DIAGNOSIS — R93.6 ABNORMAL X-RAY OF FEMUR: Primary | ICD-10-CM

## 2024-11-22 LAB — RADIOLOGIST FLAGS: ABNORMAL

## 2024-11-22 PROCEDURE — 20610 DRAIN/INJ JOINT/BURSA W/O US: CPT | Mod: RT | Performed by: ORTHOPAEDIC SURGERY

## 2024-11-22 PROCEDURE — 99204 OFFICE O/P NEW MOD 45 MIN: CPT | Mod: 25 | Performed by: ORTHOPAEDIC SURGERY

## 2024-11-22 PROCEDURE — 72170 X-RAY EXAM OF PELVIS: CPT | Mod: TC | Performed by: RADIOLOGY

## 2024-11-22 RX ADMIN — BUPIVACAINE HYDROCHLORIDE 4 ML: 2.5 INJECTION, SOLUTION INFILTRATION; PERINEURAL at 10:11

## 2024-11-22 RX ADMIN — METHYLPREDNISOLONE ACETATE 80 MG: 80 INJECTION, SUSPENSION INTRA-ARTICULAR; INTRALESIONAL; INTRAMUSCULAR; SOFT TISSUE at 10:11

## 2024-11-22 ASSESSMENT — PAIN SCALES - GENERAL: PAINLEVEL_OUTOF10: NO PAIN (0)

## 2024-11-22 NOTE — TELEPHONE ENCOUNTER
Call from Dr. Rey from radiology to report a potential stress fracture. MRI is recommended if Dr. Cano wants to confirm the stress fracture.     Routing to Dr. Cano.

## 2024-11-22 NOTE — LETTER
"11/22/2024      Luther Mariee  [address] [facility] [address]      Dear Colleague,    Thank you for referring your patient, Luther Mariee, to the [facility]. Please see a copy of my visit note below.    Chief Complaint:   Chief Complaint   Patient presents with     Right Hip - Pain     Onset: a few years, nothing concerning. He recently got new foot arches from the store and within 24 hours he was debilitated. Pain is on the outside of the hip. Denies any radiating pain. No tx. Pain increases with stairs, especially going up stairs. The pain has gotten better since he took the arches out of his shoes.         Luther Mariee is seen today in the [facility] for evaluation of right hip pain at the request of Dr. Mahesh Cantrell         HISTORY OF PRESENT ILLNESS    Luther Mariee is a 59 year old male seen for evaluation of ongoing right hip pain with no known injury.   Pain has been present for a few years. He recently got new shoe arches from the store and within 24 hours he was \"debilitated\", bad hip and knee pain. Couldn't even get out of bed. He locates pain along the outer hip without radiation. Pain increased with stairs, especially going up stairs. The pain has improved since he took the new shoe arches out. Denies groin pain    Ok at rest, night. Ok with flat walking. Most pain really with stairs.    With standing, both \"hip areas\" go numb.    Perceived limb length inequality: No    Patient is diabetic, hgb A1c= 10.7 on 6/28/2024 (was 11.8 on 2/9/2024).    Aortic dissection with AAA.    Denies low back pain.    Present symptoms: moderate pain.    Pain severity: 5/10  Pain quality: sharp  Frequency of symptoms: frequently  Exacerbating Factors: stairs, [id number], in/out of car  Relieving Factors: avoiding stairs  Night Pain:  occasional  Pain while at rest: No   Associated numbness or tingling: Yes     Patient has tried:     " NSAIDS:  ibuprofen       Acetaminophen: No     Opioids: No     Physical Therapy: No      Home exercise program: Yes      Activity modification: Yes       Assistive device:  No     Injections: No       Ice: No      Heat: No      Topicals: No     Other PMH:  has a past medical history of Acute renal failure (H) (01/01/2010), Aortic dissection (H) (02/23/2010), Ascending aortic aneurysm (H), Bicuspid aortic valve, BMI 30.0-30.9,adult, Diabetes (H), HTN (hypertension), Ischemic colitis (H) (01/01/2010), SBO (small bowel obstruction) (H) (01/01/2010), 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  Patient Active Problem List   Diagnosis     Aortic dissection (H)     Splenic infarct     Hypertension goal BP (blood pressure) < 130/80     Ascending aortic aneurysm (H)     SBO (small bowel obstruction) (H)     Ischemic colitis (H)     Bicuspid aortic valve     Morbid obesity (H)     Vitamin B12 deficiency     Vitamin D deficiency     Chronic pain     Hyperlipidemia LDL goal <100     H/O aortic dissection     CKD (chronic kidney disease) stage 2, GFR 60-89 ml/min     Diabetes mellitus, type 2 (H)     Aortic valve disorder     Calculus of gallbladder     Carotid dissection, bilateral (H)     Type 2 diabetes mellitus with complication, without long-term current use of insulin (H)     Callus of foot     Heart murmur       Surgical Hx:  has a past surgical history that includes cholecystectomy, open; endovas repair, infrarenl abdom aortic aneurysm/dissect; eozkp-yzu-scxqr/aorto-unifemoral prosthesis (2/10); small bowel resection (2010); colonoscopy (2010); and Colonoscopy (N/A, 4/12/2023).    Medications:   Current Outpatient Medications:      alcohol swab prep pads, Use to swab area of injection/kaylie as directed., Disp: 100 each, Rfl: 3     alcohol swab prep pads, Use to swab area of injection/kaylie as directed., Disp: 100 each, Rfl: 3     aspirin 81 MG tablet, Take 1 tablet by mouth daily.,  Disp: , Rfl:      azelastine (OPTIVAR) 0.05 % ophthalmic solution, APPLY 1 DROP TO AFFECTED EYE(S) 2 TIMES DAILY., Disp: 18 mL, Rfl: 1     blood glucose (NO BRAND SPECIFIED) test strip, Use to test blood sugar one times daily or as directed. To accompany: Blood Glucose Monitor Brands: per insurance., Disp: 100 strip, Rfl: 6     blood glucose (NO BRAND SPECIFIED) test strip, Use to test blood sugar 3 times daily or as directed. To accompany: Blood Glucose Monitor Brands: per insurance., Disp: 100 strip, Rfl: 6     blood glucose calibration (NO BRAND SPECIFIED) solution, To accompany: Blood Glucose Monitor Brands: per insurance., Disp: 1 each, Rfl: 0     blood glucose calibration (NO BRAND SPECIFIED) solution, To accompany: Blood Glucose Monitor Brands: per insurance., Disp: 1 Bottle, Rfl: 3     blood glucose monitoring (NO BRAND SPECIFIED) meter device kit, Use to test blood sugar once times daily or as directed. Preferred blood glucose meter OR supplies to accompany: Blood Glucose Monitor Brands: per insurance., Disp: 1 kit, Rfl: 0     blood glucose monitoring (NO BRAND SPECIFIED) meter device kit, Use to test blood sugar 3 times daily or as directed. Preferred blood glucose meter OR supplies to accompany: Blood Glucose Monitor Brands: per insurance., Disp: 1 kit, Rfl: 0     carvedilol (COREG) 25 MG tablet, TAKE 1 TABLET BY MOUTH TWICE A DAY WITH MEALS, Disp: 180 tablet, Rfl: 3     glimepiride (AMARYL) 4 MG tablet, TAKE 2 TABLETS (8 MG) BY MOUTH EVERY MORNING (BEFORE BREAKFAST), Disp: 180 tablet, Rfl: 1     ibuprofen (ADVIL/MOTRIN) 200 MG tablet, Take 800 mg by mouth., Disp: , Rfl:      lisinopril-hydrochlorothiazide (ZESTORETIC) 20-25 MG tablet, TAKE 1 TABLET BY MOUTH EVERY DAY, Disp: 90 tablet, Rfl: 2     metFORMIN (GLUCOPHAGE) 500 MG tablet, TAKE 1 TABLET (500 MG) BY MOUTH 3 TIMES DAILY (WITH MEALS), Disp: 270 tablet, Rfl: 0     mometasone (ELOCON) 0.1 % external ointment, Apply sparingly to affected area twice  "daily for 14 days.  Do not apply to face., Disp: 15 g, Rfl: 2     repaglinide (PRANDIN) 1 MG tablet, TAKE 1 TABLET (1 MG) BY MOUTH 3 TIMES DAILY BEFORE MEALS, Disp: 270 tablet, Rfl: 0     rosuvastatin (CRESTOR) 20 MG tablet, TAKE 1 TABLET BY MOUTH EVERY DAY, Disp: 90 tablet, Rfl: 2     semaglutide (OZEMPIC) 2 MG/3ML pen, Inject 0.25 mg Subcutaneous every 7 days, Disp: 3 mL, Rfl: 0     thin (NO BRAND SPECIFIED) lancets, Use with lanceting device. To accompany: Blood Glucose Monitor Brands: per insurance., Disp: 100 each, Rfl: 6     thin (NO BRAND SPECIFIED) lancets, Use with lanceting device. To accompany: Blood Glucose Monitor Brands: per insurance., Disp: 100 each, Rfl: 6    Allergies:   Allergies   Allergen Reactions     Seasonal Allergies        Social Hx:  for school districts (mostly sitting).  reports that he quit smoking about 22 years ago. His smoking use included cigarettes. He has never used smokeless tobacco. He reports that he does not drink alcohol and does not use drugs.    Family Hx: family history includes Unknown/Adopted in his father, maternal grandfather, maternal grandmother, mother, paternal grandfather, and paternal grandmother.    REVIEW OF SYSTEMS:  10 point ROS neg other than the symptoms noted above in the HPI and PAST MEDICAL HISTORY. Notables,  CONSTITUTIONAL:NEGATIVE for fever, chills, change in weight  INTEGUMENTARY/SKIN: NEGATIVE for worrisome rashes, moles or lesions  MUSCULOSKELETAL:See HPI above  NEURO: NEGATIVE for weakness, dizziness or paresthesias    PHYSICAL EXAM:  BP (!) 145/72   Pulse 58   Ht 1.629 m (5' 4.14\")   Wt 91.4 kg (201 lb 6.4 oz)   BMI 34.42 kg/m     GENERAL APPEARANCE: healthy, alert, no distress  SKIN: no suspicious lesions or rashes  NEURO: Normal strength and tone, mentation intact and speech normal  PSYCH:  mentation appears normal and affect normal  RESPIRATORY: No increased work of breathing.  LYMPH: no palpable inguinal lymph " nodes.    BILATERAL LOWER EXTREMITIES:  Gait: normal   varus alignment.  No gross deformities or masses.  Bilateral Quad atrophy, strength weaj  Intact sensation deep peroneal nerve, superficial peroneal nerve, med/lat tibial nerve, sural nerve, saphenous nerve  Intact EHL, EDL, TA, FHL, GS, quadriceps hamstrings and hip flexors   Bilateral calf soft and nttp or squeeze.  DTRs: achilles 2+, patella 2+.  Edema: trace  Leg lengths: grossly symmetric at medial malleolus.      BACK EXAM  Lumbar range of motion: flexion to touch distal shin causes pulling in hamstrings , extension adequate, side bend: adequate; pain not reproduced.  Squat: positive  Seated SLR: negative , bilateral.  Supine SLR: negative , bilateral.  Sensation:normal, bilateral.   Sciatic Notch tenderness: negative    LEFT HIP EXAM:      Palpation: Tender:   none   Strength:  4+/5  Special tests:  Irritability (flexion/adduction/internal rotation) equivocal.  Hip range of motion: Internal rotation: 10, External rotation: 50, Flexion 100      RIGHT HIP EXAM:    Palpation: Tender:   right greater trochanter, right gluteus medius, right iliotibial band.   Nontender: groin  Strength:  4/5  Special tests:  Irritability (flexion/adduction/internal rotation) mild positive.  Hip range of motion: Internal rotation: 10, External rotation: 50, Flexion 100        X-RAY: AP pelvis 11/22/2024 and AP/Lateral views right hip from 11/1/2024 were reviewed in clinic today. No obvious fractures or dislocations. Moderate right > left hip joint space narrowing and formation of marginal osteophytes. Normal alignment. Cortical thickening along the lateral aspect of the proximal right femoral diaphysis. This is nonspecific but can be seen with stress injury especially if patient is on bisphosphonates. No discrete fracture line. Surgical clips project over the right groin.         Impression: 58yo male with chronic right hip pain, primary osteoarthritis, greater trochanteric  bursitis.    Plan:   Exam, images reviewed  Xrays with bilateral hip os, more on right than left.  Also has right trochanteric burisitis.  Could all have been aggravated with recent shoe inserts, as improved with stopping wearing shoe inserts  Treatment options with therapy, home exercise program, ice/heat, iliotibial band stretching, core exercises, over the counter medications, topicals, injections (bursal or intra-articular)  At this time, he'd like to try a right trochanteric bursal injection today. See procedure note below  Consider intra-articular injection in future with sports medicine, image-guided.  Return to clinic as needed.      * All questions were addressed and answered prior to discharge from clinic today. The patient acknowledges an understanding of and agreement with the plan set forth during today's visit. Patient was advised to call our office or MyChart us if any further questions arise upon leaving our office today.        Needs better diabetic control.      Donald Cano M.D., M.S.  Dept. of Orthopaedic Surgery  [facility]       Large Joint Injection/Arthocentesis: R greater trochanteric bursa    Date/Time: 11/22/2024 10:11 AM    Performed by: Donald Cano MD  Authorized by: Donald Cano MD    Indications:  Pain  Needle Size:  22 G  Guidance: landmark guided    Approach:  Lateral  Location:  Hip      Site:  R greater trochanteric bursa  Medications:  4 mL BUPivacaine 0.25 %; 80 mg methylPREDNISolone 80 MG/ML  Outcome:  Tolerated well, no immediate complications  Procedure discussed: discussed risks, benefits, and alternatives    Consent Given by:  Patient  Timeout: timeout called immediately prior to procedure    Prep: patient was prepped and draped in usual sterile fashion           Again, thank you for allowing me to participate in the care of your patient.        Sincerely,        Donald Cano MD

## 2024-11-25 RX ORDER — METHYLPREDNISOLONE ACETATE 80 MG/ML
80 INJECTION, SUSPENSION INTRA-ARTICULAR; INTRALESIONAL; INTRAMUSCULAR; SOFT TISSUE
Status: COMPLETED | OUTPATIENT
Start: 2024-11-22 | End: 2024-11-22

## 2024-11-25 RX ORDER — BUPIVACAINE HYDROCHLORIDE 2.5 MG/ML
4 INJECTION, SOLUTION INFILTRATION; PERINEURAL
Status: COMPLETED | OUTPATIENT
Start: 2024-11-22 | End: 2024-11-22

## 2024-11-25 NOTE — TELEPHONE ENCOUNTER
Called and left  for patient regarding xrays findings of femoral cortical thickening, radiology suggest MRI to rule out stress fracture. I placed an MRI of the right femur and left  forJerry to call to schedule at his convenience, [phone].    Patient can call with any questions.    Donald Cano M.D., M.S.  Dept. of Orthopaedic Surgery  [facility]

## 2024-12-05 ENCOUNTER — OFFICE VISIT (OUTPATIENT)
Dept: PODIATRY | Facility: CLINIC | Age: 59
End: 2024-12-05
Attending: FAMILY MEDICINE
Payer: COMMERCIAL

## 2024-12-05 VITALS — HEART RATE: 60 BPM | SYSTOLIC BLOOD PRESSURE: 146 MMHG | DIASTOLIC BLOOD PRESSURE: 66 MMHG

## 2024-12-05 DIAGNOSIS — G89.29 CHRONIC PAIN OF LEFT ANKLE: ICD-10-CM

## 2024-12-05 DIAGNOSIS — Q66.70 CONGENITAL CAVUS FOOT: Primary | ICD-10-CM

## 2024-12-05 DIAGNOSIS — M25.572 CHRONIC PAIN OF LEFT ANKLE: ICD-10-CM

## 2024-12-05 NOTE — PATIENT INSTRUCTIONS
We wish you continued good healing. If you have any questions or concerns, please do not hesitate to contact us at  [phone]    [employer] (secure e-mail communication and access to your chart) to send a message or to make an appointment.    Please remember to call and schedule a follow up appointment if one was recommended at your earliest convenience.     PODIATRY CLINIC HOURS  TELEPHONE NUMBER    Dr. Julio Cesar ANTHONYPESTIVEN FACFAS        Clinics:  [facility]   [place]  Tuesday 1PM-6PM  [place]  Wednesday 745AM-330PM  [place]  Monday 2nd,4th  830AM-4PM  Thursday/Friday 745AM-230PM     [place] APPOINTMENTS  [phone]    [place] APPOINTMENTS  [phone]          If you need a medication refill, please contact us you may need lab work and/or a follow up visit prior to your refill (i.e. Antifungal medications).  If MRI needed please call Imaging at [phone]   HOW DO I GET MY KNEE SCOOTER? Knee scooters can be picked up at ANY Medical Supply stores with your knee scooter Prescription.  OR  Bring your signed prescription to an [facility] showroom.   Set up an appointment for your custom Orthotics. Call any Orthotics locations call [phone]

## 2024-12-05 NOTE — PROGRESS NOTES
Subjective:    Pt is seen today in consult from  with the c/c of chronically painful left foot and ankle pain.  Patient states he has had left foot pain for at least 20 years.   In past this was aggravated by walking on vacation.  This resolved with conservative care.  Recently he got over-the-counter arch support in his shoe and he developed the pain again.  He points to lateral foot.  Aggravated by activity and relieved by rest.  He denies ecchymosis.  He denies weakness.  He denies numbness or tingling.  Patient is at a sitdown job.  He has diabetes that is uncontrolled.  Family history unknown as he was adopted.    ROS:  A 10-point review of systems was performed and is positive for that noted in the HPI and as seen above.  All other areas are negative.          Allergies   Allergen Reactions    Seasonal Allergies        Current Outpatient Medications   Medication Sig Dispense Refill    alcohol swab prep pads Use to swab area of injection/kaylie as directed. 100 each 3    alcohol swab prep pads Use to swab area of injection/kaylie as directed. 100 each 3    aspirin 81 MG tablet Take 1 tablet by mouth daily.      azelastine (OPTIVAR) 0.05 % ophthalmic solution APPLY 1 DROP TO AFFECTED EYE(S) 2 TIMES DAILY. 18 mL 1    blood glucose (NO BRAND SPECIFIED) test strip Use to test blood sugar one times daily or as directed. To accompany: Blood Glucose Monitor Brands: per insurance. 100 strip 6    blood glucose (NO BRAND SPECIFIED) test strip Use to test blood sugar 3 times daily or as directed. To accompany: Blood Glucose Monitor Brands: per insurance. 100 strip 6    blood glucose calibration (NO BRAND SPECIFIED) solution To accompany: Blood Glucose Monitor Brands: per insurance. 1 each 0    blood glucose calibration (NO BRAND SPECIFIED) solution To accompany: Blood Glucose Monitor Brands: per insurance. 1 Bottle 3    blood glucose monitoring (NO BRAND SPECIFIED) meter device kit Use to test blood sugar once times  daily or as directed. Preferred blood glucose meter OR supplies to accompany: Blood Glucose Monitor Brands: per insurance. 1 kit 0    blood glucose monitoring (NO BRAND SPECIFIED) meter device kit Use to test blood sugar 3 times daily or as directed. Preferred blood glucose meter OR supplies to accompany: Blood Glucose Monitor Brands: per insurance. 1 kit 0    carvedilol (COREG) 25 MG tablet TAKE 1 TABLET BY MOUTH TWICE A DAY WITH MEALS 180 tablet 3    glimepiride (AMARYL) 4 MG tablet TAKE 2 TABLETS (8 MG) BY MOUTH EVERY MORNING (BEFORE BREAKFAST) 180 tablet 1    ibuprofen (ADVIL/MOTRIN) 200 MG tablet Take 800 mg by mouth.      lisinopril-hydrochlorothiazide (ZESTORETIC) 20-25 MG tablet TAKE 1 TABLET BY MOUTH EVERY DAY 90 tablet 2    metFORMIN (GLUCOPHAGE) 500 MG tablet TAKE 1 TABLET (500 MG) BY MOUTH 3 TIMES DAILY (WITH MEALS) 270 tablet 0    mometasone (ELOCON) 0.1 % external ointment Apply sparingly to affected area twice daily for 14 days.  Do not apply to face. 15 g 2    repaglinide (PRANDIN) 1 MG tablet TAKE 1 TABLET (1 MG) BY MOUTH 3 TIMES DAILY BEFORE MEALS 270 tablet 0    rosuvastatin (CRESTOR) 20 MG tablet TAKE 1 TABLET BY MOUTH EVERY DAY 90 tablet 2    semaglutide (OZEMPIC) 2 MG/3ML pen Inject 0.25 mg Subcutaneous every 7 days 3 mL 0    thin (NO BRAND SPECIFIED) lancets Use with lanceting device. To accompany: Blood Glucose Monitor Brands: per insurance. 100 each 6    thin (NO BRAND SPECIFIED) lancets Use with lanceting device. To accompany: Blood Glucose Monitor Brands: per insurance. 100 each 6       Patient Active Problem List   Diagnosis    Aortic dissection (H)    Splenic infarct    Hypertension goal BP (blood pressure) < 130/80    Ascending aortic aneurysm (H)    SBO (small bowel obstruction) (H)    Ischemic colitis (H)    Bicuspid aortic valve    Morbid obesity (H)    Vitamin B12 deficiency    Vitamin D deficiency    Chronic pain    Hyperlipidemia LDL goal <100    H/O aortic dissection    CKD  (chronic kidney disease) stage 2, GFR 60-89 ml/min    Diabetes mellitus, type 2 (H)    Aortic valve disorder    Calculus of gallbladder    Carotid dissection, bilateral (H)    Type 2 diabetes mellitus with complication, without long-term current use of insulin (H)    Callus of foot    Heart murmur       Past Medical History:   Diagnosis Date    Acute renal failure (H) 2010    resolved    Aortic dissection (H) 2010    ascending aorta. sees cv surgeon Dr. herr    Ascending aortic aneurysm (H)     Bicuspid aortic valve     sees card Dr. 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 Vane River.    Vitamin B12 deficiency     Vitamin D deficiencies        Past Surgical History:   Procedure Laterality Date    CHOLECYSTECTOMY, OPEN      COLONOSCOPY      superficial ulcerws rt colon    COLONOSCOPY N/A 2023    Procedure: COLONOSCOPY, WITH POLYPECTOMY AND BIOPSY;  Surgeon: Apolinar Borden MD;  Location: [facility]    ENDOVAS REPAIR, INFRARENL ABDOM AORTIC ANEURYSM/DISSECT; [id number]/AORTO-UNIFEMORAL PROSTHESIS  2/10    aorta dissection    SMALL BOWEL RESECTION      Dr. Vane River       Family History   Problem Relation Age of Onset    Unknown/Adopted Mother     Unknown/Adopted Father     Unknown/Adopted Maternal Grandmother     Unknown/Adopted Maternal Grandfather     Unknown/Adopted Paternal Grandmother     Unknown/Adopted Paternal Grandfather        Social History     Tobacco Use    Smoking status: Former     Current packs/day: 0.00     Types: Cigarettes     Quit date: 2001     Years since quittin.0    Smokeless tobacco: Never   Substance Use Topics    Alcohol use: No     Alcohol/week: 0.0 standard drinks of alcohol         Exam:    Vitals: BP (!)  146/66   Pulse 60   BMI: There is no height or weight on file to calculate BMI.  Height: Data Unavailable    Constitutional/ general:  Pt is in no apparent distress, appears well-nourished.  Cooperative with history and physical exam.     Psych:  The patient answered questions appropriately.  Normal affect.  Seems to have reasonable expectations, in terms of treatment.     Eyes:  Visual scanning/ tracking without deficit.     Ears:  Response to auditory stimuli is normal.  negative hearing aid devices.  Auricles in proper alignment.     Lymphatic:  Popliteal lymph nodes not enlarged.     Lungs:  Non labored breathing, non labored speech. No cough.  No audible wheezing. Even, quiet breathing.       Vascular:  positive pedal pulses bilaterally for both the DP and PT arteries.  CFT < 3 sec.  negative ankle edema.  positive pedal hair growth.    Neuro:  Alert and oriented x 3. Coordinated gait.  Light touch sensation is intact to the L4, L5, S1 distributions. No obvious deficits.  No evidence of neurological-based weakness, spasticity, or contracture in the lower extremities.      Derm: Normal texture and turgor.  No erythema, ecchymosis, or cyanosis.      Musculoskeletal:    Lower extremity muscle strength is normal.  Patient is ambulatory without an assistive device or brace.  No gross deformities.   Very cavus foot with weightbearing bilateral left>R.  No forefoot or rear foot deformities noted.    Normal ROM all fore foot and rearfoot joints with no pain or crepitus.  No equinus.  No pain with stressing any tendons.  No pain on palpation tibialis anterior or EHL.  No pain palpating or stressing peroneal tendons.  Pain centered over dorsal lateral calcaneocuboid joint.  No pain anywhere else.  No edema erythema ecchymosis or masses. No pain with range of motion.    Radiographic Exam:  X-Ray Findings:  I personally reviewed the films.  Narrative & Impression   Examination:  XR FOOT LEFT G/E 3 VIEWS     Date:   8/13/2021 9:10 AM      Clinical Information: Left foot pain     Comparison: Ankle radiographs, same date.                                                                      Impression:     1.  Generalized demineralization. No fracture or erosion. Normal joint  spacing and alignment. Soft tissue swelling in the medial ankle and  hindfoot.       Narrative & Impression   Examination:  XR ANKLE LEFT G/E 3 VIEWS     Date:  8/13/2021 9:10 AM      Clinical Information: Acute left ankle pain     Comparison: none.                                                                      Impression:     1.  Generalized demineralization. No fracture or bone lesion. Normal  joint alignment and spacing. Mild medial ankle soft tissue swelling.  Small Achilles and plantar calcaneal enthesophytes.                    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  Reviewed past notes regarding foot pathology.  Reviewed recent labs.  Discussed high arch in both feet with left greater than right.  Explained how extreme cavus foot can cause overuse.  Wearing a soft slip on shoe today.  Discussed wearing a stiffer outside shoe.  Since chronic problem discussed orthotics to help chiqui heel.  He is in agreement to this.  Wrote a prescription for orthotics to chiqui heel and plantarflexed first ray to help offload rear foot.  Will make sure the counter on his shoes does not get worn laterally as this will make this worse.  Good stiff house shoes at all times and I made suggestions.  Discussed immobilize  ankle with Cam walker if not  improving.  Physical therapy may also be helpful.  RTC as needed.  Thank you for allowing me participate in the care of this patient.        Julio Cesar Patel DPM, FACFAS

## 2024-12-05 NOTE — LETTER
12/5/2024      Luther Mariee  [address] [facility] [address]      Dear Colleague,    Thank you for referring your patient, Luther Mariee, to the [facility]. Please see a copy of my visit note below.    Subjective:    Pt is seen today in consult from  with the c/c of chronically painful left foot and ankle pain.  Patient states he has had left foot pain for at least 20 years.   In past this was aggravated by walking on vacation.  This resolved with conservative care.  Recently he got over-the-counter arch support in his shoe and he developed the pain again.  He points to lateral foot.  Aggravated by activity and relieved by rest.  He denies ecchymosis.  He denies weakness.  He denies numbness or tingling.  Patient is at a sitdown job.  He has diabetes that is uncontrolled.  Family history unknown as he was adopted.    ROS:  A 10-point review of systems was performed and is positive for that noted in the HPI and as seen above.  All other areas are negative.          Allergies   Allergen Reactions     Seasonal Allergies        Current Outpatient Medications   Medication Sig Dispense Refill     alcohol swab prep pads Use to swab area of injection/kaylie as directed. 100 each 3     alcohol swab prep pads Use to swab area of injection/kaylie as directed. 100 each 3     aspirin 81 MG tablet Take 1 tablet by mouth daily.       azelastine (OPTIVAR) 0.05 % ophthalmic solution APPLY 1 DROP TO AFFECTED EYE(S) 2 TIMES DAILY. 18 mL 1     blood glucose (NO BRAND SPECIFIED) test strip Use to test blood sugar one times daily or as directed. To accompany: Blood Glucose Monitor Brands: per insurance. 100 strip 6     blood glucose (NO BRAND SPECIFIED) test strip Use to test blood sugar 3 times daily or as directed. To accompany: Blood Glucose Monitor Brands: per insurance. 100 strip 6     blood glucose calibration (NO BRAND SPECIFIED) solution To accompany: Blood Glucose Monitor Brands:  per insurance. 1 each 0     blood glucose calibration (NO BRAND SPECIFIED) solution To accompany: Blood Glucose Monitor Brands: per insurance. 1 Bottle 3     blood glucose monitoring (NO BRAND SPECIFIED) meter device kit Use to test blood sugar once times daily or as directed. Preferred blood glucose meter OR supplies to accompany: Blood Glucose Monitor Brands: per insurance. 1 kit 0     blood glucose monitoring (NO BRAND SPECIFIED) meter device kit Use to test blood sugar 3 times daily or as directed. Preferred blood glucose meter OR supplies to accompany: Blood Glucose Monitor Brands: per insurance. 1 kit 0     carvedilol (COREG) 25 MG tablet TAKE 1 TABLET BY MOUTH TWICE A DAY WITH MEALS 180 tablet 3     glimepiride (AMARYL) 4 MG tablet TAKE 2 TABLETS (8 MG) BY MOUTH EVERY MORNING (BEFORE BREAKFAST) 180 tablet 1     ibuprofen (ADVIL/MOTRIN) 200 MG tablet Take 800 mg by mouth.       lisinopril-hydrochlorothiazide (ZESTORETIC) 20-25 MG tablet TAKE 1 TABLET BY MOUTH EVERY DAY 90 tablet 2     metFORMIN (GLUCOPHAGE) 500 MG tablet TAKE 1 TABLET (500 MG) BY MOUTH 3 TIMES DAILY (WITH MEALS) 270 tablet 0     mometasone (ELOCON) 0.1 % external ointment Apply sparingly to affected area twice daily for 14 days.  Do not apply to face. 15 g 2     repaglinide (PRANDIN) 1 MG tablet TAKE 1 TABLET (1 MG) BY MOUTH 3 TIMES DAILY BEFORE MEALS 270 tablet 0     rosuvastatin (CRESTOR) 20 MG tablet TAKE 1 TABLET BY MOUTH EVERY DAY 90 tablet 2     semaglutide (OZEMPIC) 2 MG/3ML pen Inject 0.25 mg Subcutaneous every 7 days 3 mL 0     thin (NO BRAND SPECIFIED) lancets Use with lanceting device. To accompany: Blood Glucose Monitor Brands: per insurance. 100 each 6     thin (NO BRAND SPECIFIED) lancets Use with lanceting device. To accompany: Blood Glucose Monitor Brands: per insurance. 100 each 6       Patient Active Problem List   Diagnosis     Aortic dissection (H)     Splenic infarct     Hypertension goal BP (blood pressure) < 130/80      Ascending aortic aneurysm (H)     SBO (small bowel obstruction) (H)     Ischemic colitis (H)     Bicuspid aortic valve     Morbid obesity (H)     Vitamin B12 deficiency     Vitamin D deficiency     Chronic pain     Hyperlipidemia LDL goal <100     H/O aortic dissection     CKD (chronic kidney disease) stage 2, GFR 60-89 ml/min     Diabetes mellitus, type 2 (H)     Aortic valve disorder     Calculus of gallbladder     Carotid dissection, bilateral (H)     Type 2 diabetes mellitus with complication, without long-term current use of insulin (H)     Callus of foot     Heart murmur       Past Medical History:   Diagnosis Date     Acute renal failure (H) 01/01/2010    resolved     Aortic dissection (H) 02/23/2010    ascending aorta. sees cv surgeon Dr. herr     Ascending aortic aneurysm (H)      Bicuspid aortic valve     sees card Dr. 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 Vane River.     Vitamin B12 deficiency      Vitamin D deficiencies        Past Surgical History:   Procedure Laterality Date     CHOLECYSTECTOMY, OPEN       COLONOSCOPY  2010    superficial ulcerws rt colon     COLONOSCOPY N/A 4/12/2023    Procedure: COLONOSCOPY, WITH POLYPECTOMY AND BIOPSY;  Surgeon: Apolinar Borden MD;  Location: [facility]     ENDOVAS REPAIR, INFRARENL ABDOM AORTIC ANEURYSM/DISSECT; [id number]/AORTO-UNIFEMORAL PROSTHESIS  2/10    aorta dissection     SMALL BOWEL RESECTION  2010    Dr. Vane River       Family History   Problem Relation Age of Onset     Unknown/Adopted Mother      Unknown/Adopted Father      Unknown/Adopted Maternal Grandmother      Unknown/Adopted Maternal Grandfather      Unknown/Adopted Paternal Grandmother      Unknown/Adopted Paternal Grandfather        Social  History     Tobacco Use     Smoking status: Former     Current packs/day: 0.00     Types: Cigarettes     Quit date: 2001     Years since quittin.0     Smokeless tobacco: Never   Substance Use Topics     Alcohol use: No     Alcohol/week: 0.0 standard drinks of alcohol         Exam:    Vitals: BP (!) 146/66   Pulse 60   BMI: There is no height or weight on file to calculate BMI.  Height: Data Unavailable    Constitutional/ general:  Pt is in no apparent distress, appears well-nourished.  Cooperative with history and physical exam.     Psych:  The patient answered questions appropriately.  Normal affect.  Seems to have reasonable expectations, in terms of treatment.     Eyes:  Visual scanning/ tracking without deficit.     Ears:  Response to auditory stimuli is normal.  negative hearing aid devices.  Auricles in proper alignment.     Lymphatic:  Popliteal lymph nodes not enlarged.     Lungs:  Non labored breathing, non labored speech. No cough.  No audible wheezing. Even, quiet breathing.       Vascular:  positive pedal pulses bilaterally for both the DP and PT arteries.  CFT < 3 sec.  negative ankle edema.  positive pedal hair growth.    Neuro:  Alert and oriented x 3. Coordinated gait.  Light touch sensation is intact to the L4, L5, S1 distributions. No obvious deficits.  No evidence of neurological-based weakness, spasticity, or contracture in the lower extremities.      Derm: Normal texture and turgor.  No erythema, ecchymosis, or cyanosis.      Musculoskeletal:    Lower extremity muscle strength is normal.  Patient is ambulatory without an assistive device or brace.  No gross deformities.   Very cavus foot with weightbearing bilateral left>R.  No forefoot or rear foot deformities noted.    Normal ROM all fore foot and rearfoot joints with no pain or crepitus.  No equinus.  No pain with stressing any tendons.  No pain on palpation tibialis anterior or EHL.  No pain palpating or stressing peroneal  tendons.  Pain centered over dorsal lateral calcaneocuboid joint.  No pain anywhere else.  No edema erythema ecchymosis or masses. No pain with range of motion.    Radiographic Exam:  X-Ray Findings:  I personally reviewed the films.  Narrative & Impression   Examination:  XR FOOT LEFT G/E 3 VIEWS     Date:  8/13/2021 9:10 AM      Clinical Information: Left foot pain     Comparison: Ankle radiographs, same date.                                                                      Impression:     1.  Generalized demineralization. No fracture or erosion. Normal joint  spacing and alignment. Soft tissue swelling in the medial ankle and  hindfoot.       Narrative & Impression   Examination:  XR ANKLE LEFT G/E 3 VIEWS     Date:  8/13/2021 9:10 AM      Clinical Information: Acute left ankle pain     Comparison: none.                                                                      Impression:     1.  Generalized demineralization. No fracture or bone lesion. Normal  joint alignment and spacing. Mild medial ankle soft tissue swelling.  Small Achilles and plantar calcaneal enthesophytes.                    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  Reviewed past notes regarding foot pathology.  Reviewed recent labs.  Discussed high arch in both feet with left greater than right.  Explained how extreme cavus foot can cause overuse.  Wearing a soft slip on shoe today.  Discussed wearing a stiffer outside shoe.  Since chronic problem discussed orthotics to help chiqui heel.  He is in  agreement to this.  Wrote a prescription for orthotics to chiqui heel and plantarflexed first ray to help offload rear foot.  Will make sure the counter on his shoes does not get worn laterally as this will make this worse.  Good stiff house shoes at all times and I made suggestions.  Discussed immobilize ankle with Cam walker if not  improving.  Physical therapy may also be helpful.  RTC as needed.  Thank you for allowing me participate in the care of this patient.        Julio Cesar Patel DPM, FACFAS          Again, thank you for allowing me to participate in the care of your patient.        Sincerely,        Julio Cesar Patel DPM

## 2024-12-25 DIAGNOSIS — E11.8 TYPE 2 DIABETES MELLITUS WITH COMPLICATION, WITHOUT LONG-TERM CURRENT USE OF INSULIN (H): ICD-10-CM

## 2024-12-26 RX ORDER — REPAGLINIDE 1 MG/1
TABLET ORAL
Qty: 270 TABLET | Refills: 0 | Status: SHIPPED | OUTPATIENT
Start: 2024-12-26

## 2025-01-07 DIAGNOSIS — I10 HTN, GOAL BELOW 140/90: ICD-10-CM

## 2025-01-07 RX ORDER — CARVEDILOL 25 MG/1
TABLET ORAL
Qty: 180 TABLET | Refills: 0 | Status: SHIPPED | OUTPATIENT
Start: 2025-01-07

## 2025-02-18 ENCOUNTER — ANCILLARY PROCEDURE (OUTPATIENT)
Dept: MRI IMAGING | Facility: CLINIC | Age: 60
End: 2025-02-18
Attending: ORTHOPAEDIC SURGERY
Payer: COMMERCIAL

## 2025-02-18 ENCOUNTER — ANCILLARY PROCEDURE (OUTPATIENT)
Dept: MRI IMAGING | Facility: CLINIC | Age: 60
End: 2025-02-18
Attending: FAMILY MEDICINE
Payer: COMMERCIAL

## 2025-02-18 DIAGNOSIS — Z98.890 S/P AORTIC ANEURYSM REPAIR: ICD-10-CM

## 2025-02-18 DIAGNOSIS — Z86.79 S/P AORTIC ANEURYSM REPAIR: ICD-10-CM

## 2025-02-18 DIAGNOSIS — R93.6 ABNORMAL X-RAY OF FEMUR: ICD-10-CM

## 2025-02-18 PROCEDURE — A9585 GADOBUTROL INJECTION: HCPCS | Performed by: STUDENT IN AN ORGANIZED HEALTH CARE EDUCATION/TRAINING PROGRAM

## 2025-02-18 PROCEDURE — 71555 MRI ANGIO CHEST W OR W/O DYE: CPT | Performed by: STUDENT IN AN ORGANIZED HEALTH CARE EDUCATION/TRAINING PROGRAM

## 2025-02-18 PROCEDURE — 73718 MRI LOWER EXTREMITY W/O DYE: CPT | Mod: RT | Performed by: RADIOLOGY

## 2025-02-18 RX ORDER — GADOBUTROL 604.72 MG/ML
9 INJECTION INTRAVENOUS ONCE
Status: COMPLETED | OUTPATIENT
Start: 2025-02-18 | End: 2025-02-18

## 2025-02-18 RX ADMIN — GADOBUTROL 9 ML: 604.72 INJECTION INTRAVENOUS at 08:46

## 2025-02-23 DIAGNOSIS — E11.8 TYPE 2 DIABETES MELLITUS WITH COMPLICATION, WITHOUT LONG-TERM CURRENT USE OF INSULIN (H): ICD-10-CM

## 2025-02-24 RX ORDER — REPAGLINIDE 1 MG/1
TABLET ORAL
Qty: 270 TABLET | Refills: 0 | Status: SHIPPED | OUTPATIENT
Start: 2025-02-24

## 2025-03-08 ENCOUNTER — HEALTH MAINTENANCE LETTER (OUTPATIENT)
Age: 60
End: 2025-03-08

## 2025-03-09 DIAGNOSIS — E11.65 TYPE 2 DIABETES MELLITUS WITH HYPERGLYCEMIA, WITHOUT LONG-TERM CURRENT USE OF INSULIN (H): ICD-10-CM

## 2025-03-10 RX ORDER — GLIMEPIRIDE 4 MG/1
8 TABLET ORAL
Qty: 180 TABLET | Refills: 1 | OUTPATIENT
Start: 2025-03-10

## 2025-04-12 DIAGNOSIS — E11.65 TYPE 2 DIABETES MELLITUS WITH HYPERGLYCEMIA, WITHOUT LONG-TERM CURRENT USE OF INSULIN (H): ICD-10-CM

## 2025-04-12 DIAGNOSIS — I10 HTN, GOAL BELOW 140/90: ICD-10-CM

## 2025-04-12 DIAGNOSIS — E78.5 HYPERLIPIDEMIA LDL GOAL <100: ICD-10-CM

## 2025-04-14 RX ORDER — ROSUVASTATIN CALCIUM 20 MG/1
20 TABLET, COATED ORAL DAILY
Qty: 90 TABLET | Refills: 0 | Status: SHIPPED | OUTPATIENT
Start: 2025-04-14

## 2025-04-14 RX ORDER — CARVEDILOL 25 MG/1
TABLET ORAL
Qty: 180 TABLET | Refills: 0 | Status: SHIPPED | OUTPATIENT
Start: 2025-04-14

## 2025-04-14 RX ORDER — GLIMEPIRIDE 4 MG/1
8 TABLET ORAL
Qty: 180 TABLET | Refills: 0 | Status: SHIPPED | OUTPATIENT
Start: 2025-04-14

## 2025-05-16 PROBLEM — N18.2 TYPE 2 DIABETES MELLITUS WITH STAGE 2 CHRONIC KIDNEY DISEASE, WITHOUT LONG-TERM CURRENT USE OF INSULIN (H): Status: ACTIVE | Noted: 2019-06-25

## 2025-05-16 PROBLEM — E11.22 TYPE 2 DIABETES MELLITUS WITH STAGE 2 CHRONIC KIDNEY DISEASE, WITHOUT LONG-TERM CURRENT USE OF INSULIN (H): Status: ACTIVE | Noted: 2019-06-25

## 2025-05-19 ENCOUNTER — PATIENT OUTREACH (OUTPATIENT)
Dept: CARE COORDINATION | Facility: CLINIC | Age: 60
End: 2025-05-19
Payer: COMMERCIAL

## 2025-05-19 ENCOUNTER — RESULTS FOLLOW-UP (OUTPATIENT)
Dept: FAMILY MEDICINE | Facility: CLINIC | Age: 60
End: 2025-05-19

## 2025-05-19 DIAGNOSIS — E11.65 UNCONTROLLED TYPE 2 DIABETES MELLITUS WITH HYPERGLYCEMIA (H): Primary | ICD-10-CM

## 2025-05-20 ENCOUNTER — PATIENT OUTREACH (OUTPATIENT)
Dept: CARE COORDINATION | Facility: CLINIC | Age: 60
End: 2025-05-20
Payer: COMMERCIAL

## 2025-05-21 ENCOUNTER — PATIENT OUTREACH (OUTPATIENT)
Dept: CARE COORDINATION | Facility: CLINIC | Age: 60
End: 2025-05-21
Payer: COMMERCIAL

## 2025-05-28 ENCOUNTER — NURSE TRIAGE (OUTPATIENT)
Dept: FAMILY MEDICINE | Facility: CLINIC | Age: 60
End: 2025-05-28
Payer: COMMERCIAL

## 2025-05-29 NOTE — TELEPHONE ENCOUNTER
"Writer calling pt regarding MyChart message.     Pt stated that he has been trying these orthotic    This morning pt stated that he is now able to walk better now. Pain is now in left hip, started on Monday 5/26, just siting, no activity 4-5/10. With activity bad as 10/10. Can't walk normally, has to hobble around.     Writer assisted in scheduling appt for tomorrow 5/30 with PCP at 9:10. Pt declined appt today.     Reason for Disposition   Patient wants to be seen    Additional Information   Negative: Looks like a broken bone or dislocated joint (e.g., crooked or deformed)   Negative: Sounds like a life-threatening emergency to the triager   Negative: Followed a hip injury   Negative: Leg pain is main symptom   Negative: Back pain radiating (shooting) into hip   Negative: SEVERE pain (e.g., excruciating, unable to do any normal activities) and fever   Negative: Can't stand (bear weight) or walk   Negative: Fever and red area (or area very tender to touch)   Negative: Patient sounds very sick or weak to the triager   Negative: SEVERE pain (e.g., excruciating, unable to do any normal activities)   Negative: Red area or streak > 2 inches (or 5 cm)   Negative: Painful rash with multiple small blisters grouped together (i.e., dermatomal distribution or 'band' or 'stripe')   Negative: Looks like a boil, infected sore, deep ulcer, or other infected rash (spreading redness, pus)   Negative: Localized rash is very painful (no fever)   Negative: Numbness in a leg or foot (i.e., loss of sensation)    Answer Assessment - Initial Assessment Questions  1. LOCATION and RADIATION: \"Where is the pain located?\"       Left hip, no radiation  2. QUALITY: \"What does the pain feel like?\"  (e.g., sharp, dull, aching, burning)      Dull pain when doing nothing, sharp with activity  3. SEVERITY: \"How bad is the pain?\" \"What does it keep you from doing?\"   (Scale 1-10; or mild, moderate, severe)      No activity 4-5/10, activy   4. ONSET: " "\"When did the pain start?\" \"Does it come and go, or is it there all the time?\"      Monday  5. WORK OR EXERCISE: \"Has there been any recent work or exercise that involved this part of the body?\"       no  6. CAUSE: \"What do you think is causing the hip pain?\"       orthodics  7. AGGRAVATING FACTORS: \"What makes the hip pain worse?\" (e.g., walking, climbing stairs, running)      Walking,   8. OTHER SYMPTOMS: \"Do you have any other symptoms?\" (e.g., back pain, pain shooting down leg,  fever, rash)      Denies.    Protocols used: Hip Pain-A-[place]    Anyi Estrada RN    "

## 2025-06-29 DIAGNOSIS — E11.65 TYPE 2 DIABETES MELLITUS WITH HYPERGLYCEMIA, WITHOUT LONG-TERM CURRENT USE OF INSULIN (H): ICD-10-CM

## 2025-06-30 RX ORDER — GLIMEPIRIDE 4 MG/1
TABLET ORAL
Qty: 180 TABLET | Refills: 0 | Status: SHIPPED | OUTPATIENT
Start: 2025-06-30

## 2025-07-06 DIAGNOSIS — I10 HTN, GOAL BELOW 140/90: ICD-10-CM

## 2025-07-06 RX ORDER — LISINOPRIL AND HYDROCHLOROTHIAZIDE 20; 25 MG/1; MG/1
1 TABLET ORAL DAILY
Qty: 90 TABLET | Refills: 1 | Status: SHIPPED | OUTPATIENT
Start: 2025-07-06

## 2025-07-08 DIAGNOSIS — E11.8 TYPE 2 DIABETES MELLITUS WITH COMPLICATION, WITHOUT LONG-TERM CURRENT USE OF INSULIN (H): ICD-10-CM

## 2025-07-08 RX ORDER — REPAGLINIDE 1 MG/1
TABLET ORAL
Qty: 270 TABLET | Refills: 0 | Status: SHIPPED | OUTPATIENT
Start: 2025-07-08

## 2025-07-10 ENCOUNTER — OFFICE VISIT (OUTPATIENT)
Dept: PODIATRY | Facility: CLINIC | Age: 60
End: 2025-07-10
Payer: COMMERCIAL

## 2025-07-10 VITALS — WEIGHT: 199 LBS | BODY MASS INDEX: 34.05 KG/M2

## 2025-07-10 DIAGNOSIS — Q66.71 CONGENITAL CAVUS DEFORMITY OF BOTH FEET: ICD-10-CM

## 2025-07-10 DIAGNOSIS — M79.672 PAIN OF LEFT FOOT: Primary | ICD-10-CM

## 2025-07-10 DIAGNOSIS — N18.2 TYPE 2 DIABETES MELLITUS WITH STAGE 2 CHRONIC KIDNEY DISEASE, WITHOUT LONG-TERM CURRENT USE OF INSULIN (H): ICD-10-CM

## 2025-07-10 DIAGNOSIS — E11.22 TYPE 2 DIABETES MELLITUS WITH STAGE 2 CHRONIC KIDNEY DISEASE, WITHOUT LONG-TERM CURRENT USE OF INSULIN (H): ICD-10-CM

## 2025-07-10 DIAGNOSIS — R29.898 LEFT LEG WEAKNESS: ICD-10-CM

## 2025-07-10 DIAGNOSIS — Q66.72 CONGENITAL CAVUS DEFORMITY OF BOTH FEET: ICD-10-CM

## 2025-07-10 NOTE — PATIENT INSTRUCTIONS
We wish you continued good healing. If you have any questions or concerns, please do not hesitate to contact us at  [phone]    [employer] ([employer]t (secure e-mail communication and access to your chart) to send a message or to make an appointment.    Please remember to call and schedule a follow up appointment if one was recommended at your earliest convenience.     PODIATRY CLINIC HOURS  TELEPHONE NUMBER    Dr. Julio Cesar ANTHONYPESTIVEN FACFAS        Clinics:  [facility]   [place]  Tuesday 1PM-6PM  [place]  Wednesday 745AM-330PM  [place]  Monday 2nd,4th  830AM-4PM  Thursday/Friday 745AM-230PM     [place] APPOINTMENTS  [phone]    [place] APPOINTMENTS  [phone]          If you need a medication refill, please contact us you may need lab work and/or a follow up visit prior to your refill (i.e. Antifungal medications).  If MRI needed please call Imaging at [phone]  HOW DO I GET MY KNEE SCOOTER? Knee scooters can be picked up at ANY Medical Supply stores with your knee scooter Prescription.  OR  Bring your signed prescription to an [facility] showroom.   Set up an appointment for your custom Orthotics. Call any Orthotics locations call [phone]

## 2025-07-10 NOTE — PROGRESS NOTES
Subjective:    12/5/24   Pt is seen today in consult from  with the c/c of chronically painful left foot and ankle pain.  Patient states he has had left foot pain for at least 20 years.   In past this was aggravated by walking on vacation.  This resolved with conservative care.  Recently he got over-the-counter arch support in his shoe and he developed the pain again.  He points to lateral foot.  Aggravated by activity and relieved by rest.  He denies ecchymosis.  He denies weakness.  He denies numbness or tingling.  Patient is at a sitdown job.  He has diabetes that is uncontrolled.  Family history unknown as he was adopted.    7/10/25 patient returns for evaluation of left foot.  Since I saw him last he got orthotics.  He states they were comfortable.  Believes they were helpful.  He states approximately 2 months ago started having hip pain again.  Has seen primary care and states he will be seeing orthopedics soon.  He complains that his left foot is weaker.  Believes it is inverting more.  Denies any new swelling or bruising on left foot.  Wears orthotics and good shoe.    ROS:  see above         Allergies   Allergen Reactions    Seasonal Allergies        Current Outpatient Medications   Medication Sig Dispense Refill    alcohol swab prep pads Use to swab area of injection/kaylie as directed. 100 each 3    alcohol swab prep pads Use to swab area of injection/kaylie as directed. (Patient not taking: Reported on 5/30/2025) 100 each 3    aspirin 81 MG tablet Take 1 tablet by mouth daily.      azelastine (OPTIVAR) 0.05 % ophthalmic solution APPLY 1 DROP TO AFFECTED EYE(S) 2 TIMES DAILY. (Patient not taking: Reported on 5/30/2025) 18 mL 1    blood glucose (NO BRAND SPECIFIED) test strip Use to test blood sugar one times daily or as directed. To accompany: Blood Glucose Monitor Brands: per insurance. 100 strip 6    blood glucose (NO BRAND SPECIFIED) test strip Use to test blood sugar 3 times daily or as directed.  To accompany: Blood Glucose Monitor Brands: per insurance. (Patient not taking: Reported on 5/30/2025) 100 strip 6    blood glucose calibration (NO BRAND SPECIFIED) solution To accompany: Blood Glucose Monitor Brands: per insurance. 1 each 0    blood glucose calibration (NO BRAND SPECIFIED) solution To accompany: Blood Glucose Monitor Brands: per insurance. (Patient not taking: Reported on 5/30/2025) 1 Bottle 3    blood glucose monitoring (NO BRAND SPECIFIED) meter device kit Use to test blood sugar once times daily or as directed. Preferred blood glucose meter OR supplies to accompany: Blood Glucose Monitor Brands: per insurance. 1 kit 0    blood glucose monitoring (NO BRAND SPECIFIED) meter device kit Use to test blood sugar 3 times daily or as directed. Preferred blood glucose meter OR supplies to accompany: Blood Glucose Monitor Brands: per insurance. (Patient not taking: Reported on 5/30/2025) 1 kit 0    carvedilol (COREG) 25 MG tablet TAKE 1 TABLET BY MOUTH TWICE A DAY WITH FOOD 180 tablet 0    glimepiride (AMARYL) 4 MG tablet TAKE 2 TABLETS BY MOUTH EVERY MORNING (BEFORE BREAKFAST) 180 tablet 0    ibuprofen (ADVIL/MOTRIN) 200 MG tablet Take 800 mg by mouth.      lisinopril-hydrochlorothiazide (ZESTORETIC) 20-25 MG tablet TAKE 1 TABLET BY MOUTH EVERY DAY 90 tablet 1    metFORMIN (GLUCOPHAGE) 500 MG tablet TAKE 1 TABLET (500 MG) BY MOUTH 3 TIMES DAILY (WITH MEALS) 270 tablet 0    mometasone (ELOCON) 0.1 % external ointment Apply sparingly to affected area twice daily for 14 days.  Do not apply to face. 15 g 2    repaglinide (PRANDIN) 1 MG tablet TAKE 1 TABLET BY MOUTH 3 TIMES DAILY BEFORE MEALS 270 tablet 0    rosuvastatin (CRESTOR) 20 MG tablet TAKE 1 TABLET BY MOUTH EVERY DAY 90 tablet 0    Semaglutide (RYBELSUS) 3 MG tablet Take 3 mg by mouth daily. (Patient not taking: Reported on 5/30/2025) 30 tablet 0    thin (NO BRAND SPECIFIED) lancets Use with lanceting device. To accompany: Blood Glucose Monitor  Brands: per insurance. 100 each 6    thin (NO BRAND SPECIFIED) lancets Use with lanceting device. To accompany: Blood Glucose Monitor Brands: per insurance. (Patient not taking: Reported on 5/30/2025) 100 each 6       Patient Active Problem List   Diagnosis    Aortic dissection (H)    Splenic infarct    Hypertension goal BP (blood pressure) < 130/80    Ascending aortic aneurysm    SBO (small bowel obstruction) (H)    Ischemic colitis    Bicuspid aortic valve    Morbid obesity (H)    Vitamin B12 deficiency    Vitamin D deficiency    Chronic pain    Hyperlipidemia LDL goal <100    H/O aortic dissection    CKD (chronic kidney disease) stage 2, GFR 60-89 ml/min    Diabetes mellitus, type 2 (H)    Aortic valve disorder    Calculus of gallbladder    Carotid dissection, bilateral    Type 2 diabetes mellitus with stage 2 chronic kidney disease, without long-term current use of insulin (H)    Callus of foot    Heart murmur       Past Medical History:   Diagnosis Date    Acute renal failure 01/01/2010    resolved    Aortic dissection (H) 02/23/2010    ascending aorta. sees cv surgeon Dr. herr    Ascending aortic aneurysm     Bicuspid aortic valve     sees card Dr. 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 Vane River.    Vitamin B12 deficiency     Vitamin D deficiencies        Past Surgical History:   Procedure Laterality Date    CHOLECYSTECTOMY, OPEN      COLONOSCOPY  2010    superficial ulcerws rt colon    COLONOSCOPY N/A 4/12/2023    Procedure: COLONOSCOPY, WITH POLYPECTOMY AND BIOPSY;  Surgeon: Apolinar Borden MD;  Location: [facility]    ENDOVAS REPAIR, INFRARENL ABDOM AORTIC ANEURYSM/DISSECT; [id number]/AORTO-UNIFEMORAL PROSTHESIS  2/10    aorta dissection    SMALL BOWEL RESECTION  2010     Dr. Vane River       Family History   Problem Relation Age of Onset    Unknown/Adopted Mother     Unknown/Adopted Father     Unknown/Adopted Maternal Grandmother     Unknown/Adopted Maternal Grandfather     Unknown/Adopted Paternal Grandmother     Unknown/Adopted Paternal Grandfather        Social History     Tobacco Use    Smoking status: Former     Current packs/day: 0.00     Types: Cigarettes     Quit date: 2001     Years since quittin.6    Smokeless tobacco: Never   Substance Use Topics    Alcohol use: No     Alcohol/week: 0.0 standard drinks of alcohol         Exam:    Vitals: Wt 90.3 kg (199 lb)   BMI 34.05 kg/m    BMI: Body mass index is 34.05 kg/m .  Height: Data Unavailable    Constitutional/ general:  Pt is in no apparent distress, appears well-nourished.  Cooperative with history and physical exam.     Psych:  The patient answered questions appropriately.  Normal affect.  Seems to have reasonable expectations, in terms of treatment.     Eyes:  Visual scanning/ tracking without deficit.     Ears:  Response to auditory stimuli is normal.  negative hearing aid devices.  Auricles in proper alignment.     Lymphatic:  Popliteal lymph nodes not enlarged.     Lungs:  Non labored breathing, non labored speech. No cough.  No audible wheezing. Even, quiet breathing.       Vascular:  positive pedal pulses bilaterally for both the DP and PT arteries.  CFT < 3 sec.  negative ankle edema.  positive pedal hair growth.    Neuro:  Alert and oriented x 3. Coordinated gait.  Light touch sensation is intact to the L4, L5, S1 distributions.     Derm: Normal texture and turgor.  No erythema, ecchymosis, or cyanosis.      Musculoskeletal:    Lower extremity muscle strength is normal on right and left foot lateral compartment is weak..  Patient is ambulatory without an assistive device or brace.  No gross deformities.   Very cavus foot with weightbearing bilateral left>R.  No forefoot or rear foot deformities  noted.    Normal ROM all fore foot and rearfoot joints with no pain or crepitus.  No equinus.  No pain with stressing any tendons.  No pain on palpation tibialis anterior or EHL.  No pain palpating or stressing peroneal tendons.  Pain centered over dorsal lateral calcaneocuboid joint.  No pain anywhere else.  No edema erythema ecchymosis or masses. No pain with range of motion.    Radiographic Exam:  X-Ray Findings:  I personally reviewed the films.  Narrative & Impression   Examination:  XR FOOT LEFT G/E 3 VIEWS     Date:  8/13/2021 9:10 AM      Clinical Information: Left foot pain     Comparison: Ankle radiographs, same date.                                                                      Impression:     1.  Generalized demineralization. No fracture or erosion. Normal joint  spacing and alignment. Soft tissue swelling in the medial ankle and  hindfoot.       Narrative & Impression   Examination:  XR ANKLE LEFT G/E 3 VIEWS     Date:  8/13/2021 9:10 AM      Clinical Information: Acute left ankle pain     Comparison: none.                                                                      Impression:     1.  Generalized demineralization. No fracture or bone lesion. Normal  joint alignment and spacing. Mild medial ankle soft tissue swelling.  Small Achilles and plantar calcaneal enthesophytes.                    Component  Ref Range & Units 1 mo ago  (5/16/25) 1 yr ago  (6/28/24) 1 yr ago  (2/9/24) 2 yr ago  (4/21/23) 2 yr ago  (8/1/22) 3 yr ago  (1/17/22) 4 yr ago  (6/3/21)    Estimated Average Glucose  <117 mg/dL 303 High           Hemoglobin A1C  0.0 - 5.6 % 12.2 High  10.7 High  CM 11.8 High  8.7 High  CM 10.3 High  CM 8.8 High  CM 10.1 High  R,          A:   Left foot lateral compartment weakness  Bilateral cavus foot left>R with chronic history lateral pain  Left calcaneocuboid joint pain  Diabetes uncontrolled    Plan:  We evaluated the orthotic.  Has good fit and formed his foot and made to chiqui left  heel.  He is wearing a good shoe today.  Will continue using these on his feet.  Discussed left lower extremity weakness could possibly be from radiculopathy.  Will refer to spine for evaluation of this.  Encouraged him to see orthopedics for hip problems.  If foot still problematic could perform joint injection or additional imaging.  RTC as needed.      Julio Cesar Patel DPM, FACFAS

## 2025-07-10 NOTE — LETTER
7/10/2025      Luther Mariee  [address] [address]      Dear Colleague,    Thank you for referring your patient, Luther Mariee, to the [facility]. Please see a copy of my visit note below.    Subjective:    12/5/24   Pt is seen today in consult from  with the c/c of chronically painful left foot and ankle pain.  Patient states he has had left foot pain for at least 20 years.   In past this was aggravated by walking on vacation.  This resolved with conservative care.  Recently he got over-the-counter arch support in his shoe and he developed the pain again.  He points to lateral foot.  Aggravated by activity and relieved by rest.  He denies ecchymosis.  He denies weakness.  He denies numbness or tingling.  Patient is at a sitdown job.  He has diabetes that is uncontrolled.  Family history unknown as he was adopted.    7/10/25 patient returns for evaluation of left foot.  Since I saw him last he got orthotics.  He states they were comfortable.  Believes they were helpful.  He states approximately 2 months ago started having hip pain again.  Has seen primary care and states he will be seeing orthopedics soon.  He complains that his left foot is weaker.  Believes it is inverting more.  Denies any new swelling or bruising on left foot.  Wears orthotics and good shoe.    ROS:  see above         Allergies   Allergen Reactions     Seasonal Allergies        Current Outpatient Medications   Medication Sig Dispense Refill     alcohol swab prep pads Use to swab area of injection/kaylie as directed. 100 each 3     alcohol swab prep pads Use to swab area of injection/kaylie as directed. (Patient not taking: Reported on 5/30/2025) 100 each 3     aspirin 81 MG tablet Take 1 tablet by mouth daily.       azelastine (OPTIVAR) 0.05 % ophthalmic solution APPLY 1 DROP TO AFFECTED EYE(S) 2 TIMES DAILY. (Patient not taking: Reported on 5/30/2025) 18 mL 1     blood glucose (NO BRAND  SPECIFIED) test strip Use to test blood sugar one times daily or as directed. To accompany: Blood Glucose Monitor Brands: per insurance. 100 strip 6     blood glucose (NO BRAND SPECIFIED) test strip Use to test blood sugar 3 times daily or as directed. To accompany: Blood Glucose Monitor Brands: per insurance. (Patient not taking: Reported on 5/30/2025) 100 strip 6     blood glucose calibration (NO BRAND SPECIFIED) solution To accompany: Blood Glucose Monitor Brands: per insurance. 1 each 0     blood glucose calibration (NO BRAND SPECIFIED) solution To accompany: Blood Glucose Monitor Brands: per insurance. (Patient not taking: Reported on 5/30/2025) 1 Bottle 3     blood glucose monitoring (NO BRAND SPECIFIED) meter device kit Use to test blood sugar once times daily or as directed. Preferred blood glucose meter OR supplies to accompany: Blood Glucose Monitor Brands: per insurance. 1 kit 0     blood glucose monitoring (NO BRAND SPECIFIED) meter device kit Use to test blood sugar 3 times daily or as directed. Preferred blood glucose meter OR supplies to accompany: Blood Glucose Monitor Brands: per insurance. (Patient not taking: Reported on 5/30/2025) 1 kit 0     carvedilol (COREG) 25 MG tablet TAKE 1 TABLET BY MOUTH TWICE A DAY WITH FOOD 180 tablet 0     glimepiride (AMARYL) 4 MG tablet TAKE 2 TABLETS BY MOUTH EVERY MORNING (BEFORE BREAKFAST) 180 tablet 0     ibuprofen (ADVIL/MOTRIN) 200 MG tablet Take 800 mg by mouth.       lisinopril-hydrochlorothiazide (ZESTORETIC) 20-25 MG tablet TAKE 1 TABLET BY MOUTH EVERY DAY 90 tablet 1     metFORMIN (GLUCOPHAGE) 500 MG tablet TAKE 1 TABLET (500 MG) BY MOUTH 3 TIMES DAILY (WITH MEALS) 270 tablet 0     mometasone (ELOCON) 0.1 % external ointment Apply sparingly to affected area twice daily for 14 days.  Do not apply to face. 15 g 2     repaglinide (PRANDIN) 1 MG tablet TAKE 1 TABLET BY MOUTH 3 TIMES DAILY BEFORE MEALS 270 tablet 0     rosuvastatin (CRESTOR) 20 MG tablet TAKE  1 TABLET BY MOUTH EVERY DAY 90 tablet 0     Semaglutide (RYBELSUS) 3 MG tablet Take 3 mg by mouth daily. (Patient not taking: Reported on 5/30/2025) 30 tablet 0     thin (NO BRAND SPECIFIED) lancets Use with lanceting device. To accompany: Blood Glucose Monitor Brands: per insurance. 100 each 6     thin (NO BRAND SPECIFIED) lancets Use with lanceting device. To accompany: Blood Glucose Monitor Brands: per insurance. (Patient not taking: Reported on 5/30/2025) 100 each 6       Patient Active Problem List   Diagnosis     Aortic dissection (H)     Splenic infarct     Hypertension goal BP (blood pressure) < 130/80     Ascending aortic aneurysm     SBO (small bowel obstruction) (H)     Ischemic colitis     Bicuspid aortic valve     Morbid obesity (H)     Vitamin B12 deficiency     Vitamin D deficiency     Chronic pain     Hyperlipidemia LDL goal <100     H/O aortic dissection     CKD (chronic kidney disease) stage 2, GFR 60-89 ml/min     Diabetes mellitus, type 2 (H)     Aortic valve disorder     Calculus of gallbladder     Carotid dissection, bilateral     Type 2 diabetes mellitus with stage 2 chronic kidney disease, without long-term current use of insulin (H)     Callus of foot     Heart murmur       Past Medical History:   Diagnosis Date     Acute renal failure 01/01/2010    resolved     Aortic dissection (H) 02/23/2010    ascending aorta. sees cv surgeon Dr. herr     Ascending aortic aneurysm      Bicuspid aortic valve     sees card Dr. 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 Vane River.     Vitamin B12 deficiency      Vitamin D deficiencies        Past Surgical History:   Procedure Laterality Date     CHOLECYSTECTOMY, OPEN       COLONOSCOPY  2010    superficial ulcerws rt colon      COLONOSCOPY N/A 2023    Procedure: COLONOSCOPY, WITH POLYPECTOMY AND BIOPSY;  Surgeon: Apolinar Borden MD;  Location: [facility]     ENDOVAS REPAIR, INFRARENL ABDOM AORTIC ANEURYSM/DISSECT; [id number]/AORTO-UNIFEMORAL PROSTHESIS  2/10    aorta dissection     SMALL BOWEL RESECTION      Dr. Vane River       Family History   Problem Relation Age of Onset     Unknown/Adopted Mother      Unknown/Adopted Father      Unknown/Adopted Maternal Grandmother      Unknown/Adopted Maternal Grandfather      Unknown/Adopted Paternal Grandmother      Unknown/Adopted Paternal Grandfather        Social History     Tobacco Use     Smoking status: Former     Current packs/day: 0.00     Types: Cigarettes     Quit date: 2001     Years since quittin.6     Smokeless tobacco: Never   Substance Use Topics     Alcohol use: No     Alcohol/week: 0.0 standard drinks of alcohol         Exam:    Vitals: Wt 90.3 kg (199 lb)   BMI 34.05 kg/m    BMI: Body mass index is 34.05 kg/m .  Height: Data Unavailable    Constitutional/ general:  Pt is in no apparent distress, appears well-nourished.  Cooperative with history and physical exam.     Psych:  The patient answered questions appropriately.  Normal affect.  Seems to have reasonable expectations, in terms of treatment.     Eyes:  Visual scanning/ tracking without deficit.     Ears:  Response to auditory stimuli is normal.  negative hearing aid devices.  Auricles in proper alignment.     Lymphatic:  Popliteal lymph nodes not enlarged.     Lungs:  Non labored breathing, non labored speech. No cough.  No audible wheezing. Even, quiet breathing.       Vascular:  positive pedal pulses bilaterally for both the DP and PT arteries.  CFT < 3 sec.  negative ankle edema.  positive pedal hair growth.    Neuro:  Alert and oriented x 3. Coordinated gait.  Light touch sensation is intact to the L4, L5, S1 distributions.     Derm: Normal texture and turgor.  No erythema, ecchymosis,  or cyanosis.      Musculoskeletal:    Lower extremity muscle strength is normal on right and left foot lateral compartment is weak..  Patient is ambulatory without an assistive device or brace.  No gross deformities.   Very cavus foot with weightbearing bilateral left>R.  No forefoot or rear foot deformities noted.    Normal ROM all fore foot and rearfoot joints with no pain or crepitus.  No equinus.  No pain with stressing any tendons.  No pain on palpation tibialis anterior or EHL.  No pain palpating or stressing peroneal tendons.  Pain centered over dorsal lateral calcaneocuboid joint.  No pain anywhere else.  No edema erythema ecchymosis or masses. No pain with range of motion.    Radiographic Exam:  X-Ray Findings:  I personally reviewed the films.  Narrative & Impression   Examination:  XR FOOT LEFT G/E 3 VIEWS     Date:  8/13/2021 9:10 AM      Clinical Information: Left foot pain     Comparison: Ankle radiographs, same date.                                                                      Impression:     1.  Generalized demineralization. No fracture or erosion. Normal joint  spacing and alignment. Soft tissue swelling in the medial ankle and  hindfoot.       Narrative & Impression   Examination:  XR ANKLE LEFT G/E 3 VIEWS     Date:  8/13/2021 9:10 AM      Clinical Information: Acute left ankle pain     Comparison: none.                                                                      Impression:     1.  Generalized demineralization. No fracture or bone lesion. Normal  joint alignment and spacing. Mild medial ankle soft tissue swelling.  Small Achilles and plantar calcaneal enthesophytes.                    Component  Ref Range & Units 1 mo ago  (5/16/25) 1 yr ago  (6/28/24) 1 yr ago  (2/9/24) 2 yr ago  (4/21/23) 2 yr ago  (8/1/22) 3 yr ago  (1/17/22) 4 yr ago  (6/3/21)    Estimated Average Glucose  <117 mg/dL 303 High           Hemoglobin A1C  0.0 - 5.6 % 12.2 High  10.7 High  CM 11.8 High  8.7 High   CM 10.3 High  CM 8.8 High  CM 10.1 High  R,          A:   Left foot lateral compartment weakness  Bilateral cavus foot left>R with chronic history lateral pain  Left calcaneocuboid joint pain  Diabetes uncontrolled    Plan:  We evaluated the orthotic.  Has good fit and formed his foot and made to chiqui left heel.  He is wearing a good shoe today.  Will continue using these on his feet.  Discussed left lower extremity weakness could possibly be from radiculopathy.  Will refer to spine for evaluation of this.  Encouraged him to see orthopedics for hip problems.  If foot still problematic could perform joint injection or additional imaging.  RTC as needed.      Julio Cesar Patel DPM, FACFAS          Again, thank you for allowing me to participate in the care of your patient.        Sincerely,        Julio Cesar Patel DPM    Electronically signed

## 2025-07-13 ENCOUNTER — HEALTH MAINTENANCE LETTER (OUTPATIENT)
Age: 60
End: 2025-07-13

## 2025-07-14 ENCOUNTER — PATIENT OUTREACH (OUTPATIENT)
Dept: CARE COORDINATION | Facility: CLINIC | Age: 60
End: 2025-07-14
Payer: COMMERCIAL

## 2025-07-16 ENCOUNTER — PATIENT OUTREACH (OUTPATIENT)
Dept: CARE COORDINATION | Facility: CLINIC | Age: 60
End: 2025-07-16
Payer: COMMERCIAL

## 2025-07-17 PROBLEM — L84 CALLUS OF FOOT: Status: RESOLVED | Noted: 2024-06-28 | Resolved: 2025-07-17

## 2025-07-17 PROBLEM — R01.1 HEART MURMUR: Status: RESOLVED | Noted: 2024-06-28 | Resolved: 2025-07-17

## 2025-07-24 PROBLEM — E11.9 DIABETES MELLITUS, TYPE 2 (H): Status: RESOLVED | Noted: 2018-12-28 | Resolved: 2025-07-24

## 2025-08-24 ENCOUNTER — HEALTH MAINTENANCE LETTER (OUTPATIENT)
Age: 60
End: 2025-08-24

## (undated) RX ORDER — FENTANYL CITRATE 50 UG/ML
INJECTION, SOLUTION INTRAMUSCULAR; INTRAVENOUS
Status: DISPENSED
Start: 2023-04-12